# Patient Record
Sex: MALE | Race: WHITE | NOT HISPANIC OR LATINO
[De-identification: names, ages, dates, MRNs, and addresses within clinical notes are randomized per-mention and may not be internally consistent; named-entity substitution may affect disease eponyms.]

---

## 2017-04-06 ENCOUNTER — APPOINTMENT (OUTPATIENT)
Dept: FAMILY MEDICINE | Facility: HOSPITAL | Age: 42
End: 2017-04-06

## 2017-04-06 ENCOUNTER — OUTPATIENT (OUTPATIENT)
Dept: OUTPATIENT SERVICES | Facility: HOSPITAL | Age: 42
LOS: 1 days | End: 2017-04-06
Payer: SELF-PAY

## 2017-04-06 VITALS
WEIGHT: 175 LBS | DIASTOLIC BLOOD PRESSURE: 80 MMHG | RESPIRATION RATE: 14 BRPM | BODY MASS INDEX: 26.52 KG/M2 | TEMPERATURE: 97.8 F | SYSTOLIC BLOOD PRESSURE: 118 MMHG | HEART RATE: 85 BPM | OXYGEN SATURATION: 97 % | HEIGHT: 68 IN

## 2017-04-06 DIAGNOSIS — F41.9 ANXIETY DISORDER, UNSPECIFIED: ICD-10-CM

## 2017-04-06 DIAGNOSIS — R10.9 UNSPECIFIED ABDOMINAL PAIN: ICD-10-CM

## 2017-04-06 DIAGNOSIS — R19.5 OTHER FECAL ABNORMALITIES: ICD-10-CM

## 2017-04-06 PROCEDURE — G0463: CPT

## 2017-04-10 ENCOUNTER — OUTPATIENT (OUTPATIENT)
Dept: OUTPATIENT SERVICES | Facility: HOSPITAL | Age: 42
LOS: 1 days | End: 2017-04-10
Payer: SELF-PAY

## 2017-04-10 DIAGNOSIS — Z00.00 ENCOUNTER FOR GENERAL ADULT MEDICAL EXAMINATION WITHOUT ABNORMAL FINDINGS: ICD-10-CM

## 2017-04-10 PROCEDURE — 80061 LIPID PANEL: CPT

## 2017-04-10 PROCEDURE — 85025 COMPLETE CBC W/AUTO DIFF WBC: CPT

## 2017-04-10 PROCEDURE — 83036 HEMOGLOBIN GLYCOSYLATED A1C: CPT

## 2017-04-10 PROCEDURE — 80053 COMPREHEN METABOLIC PANEL: CPT

## 2017-04-10 PROCEDURE — 36415 COLL VENOUS BLD VENIPUNCTURE: CPT

## 2017-04-26 ENCOUNTER — OUTPATIENT (OUTPATIENT)
Dept: OUTPATIENT SERVICES | Facility: HOSPITAL | Age: 42
LOS: 1 days | End: 2017-04-26
Payer: SELF-PAY

## 2017-04-26 ENCOUNTER — APPOINTMENT (OUTPATIENT)
Dept: FAMILY MEDICINE | Facility: HOSPITAL | Age: 42
End: 2017-04-26

## 2017-04-26 VITALS
WEIGHT: 176 LBS | BODY MASS INDEX: 26.67 KG/M2 | RESPIRATION RATE: 15 BRPM | SYSTOLIC BLOOD PRESSURE: 123 MMHG | DIASTOLIC BLOOD PRESSURE: 76 MMHG | TEMPERATURE: 98.1 F | HEIGHT: 68 IN | OXYGEN SATURATION: 98 % | HEART RATE: 78 BPM

## 2017-04-26 DIAGNOSIS — F32.9 MAJOR DEPRESSIVE DISORDER, SINGLE EPISODE, UNSPECIFIED: ICD-10-CM

## 2017-04-26 DIAGNOSIS — F41.9 ANXIETY DISORDER, UNSPECIFIED: ICD-10-CM

## 2017-04-26 PROCEDURE — G0463: CPT

## 2017-05-19 ENCOUNTER — OUTPATIENT (OUTPATIENT)
Dept: OUTPATIENT SERVICES | Facility: HOSPITAL | Age: 42
LOS: 1 days | End: 2017-05-19
Payer: SELF-PAY

## 2017-05-19 ENCOUNTER — APPOINTMENT (OUTPATIENT)
Dept: FAMILY MEDICINE | Facility: HOSPITAL | Age: 42
End: 2017-05-19

## 2017-05-19 VITALS
WEIGHT: 172 LBS | HEART RATE: 80 BPM | BODY MASS INDEX: 26.07 KG/M2 | HEIGHT: 68 IN | TEMPERATURE: 97.2 F | SYSTOLIC BLOOD PRESSURE: 109 MMHG | OXYGEN SATURATION: 98 % | DIASTOLIC BLOOD PRESSURE: 75 MMHG | RESPIRATION RATE: 16 BRPM

## 2017-05-19 DIAGNOSIS — F41.9 ANXIETY DISORDER, UNSPECIFIED: ICD-10-CM

## 2017-05-19 PROCEDURE — G0463: CPT

## 2017-06-02 ENCOUNTER — APPOINTMENT (OUTPATIENT)
Dept: FAMILY MEDICINE | Facility: HOSPITAL | Age: 42
End: 2017-06-02

## 2017-06-02 ENCOUNTER — OUTPATIENT (OUTPATIENT)
Dept: OUTPATIENT SERVICES | Facility: HOSPITAL | Age: 42
LOS: 1 days | End: 2017-06-02
Payer: SELF-PAY

## 2017-06-02 VITALS
SYSTOLIC BLOOD PRESSURE: 112 MMHG | HEART RATE: 84 BPM | RESPIRATION RATE: 16 BRPM | HEIGHT: 68 IN | WEIGHT: 171 LBS | TEMPERATURE: 98.2 F | DIASTOLIC BLOOD PRESSURE: 78 MMHG | BODY MASS INDEX: 25.91 KG/M2 | OXYGEN SATURATION: 96 %

## 2017-06-02 DIAGNOSIS — Z87.898 PERSONAL HISTORY OF OTHER SPECIFIED CONDITIONS: ICD-10-CM

## 2017-06-02 PROCEDURE — G0463: CPT

## 2017-06-12 ENCOUNTER — RX RENEWAL (OUTPATIENT)
Age: 42
End: 2017-06-12

## 2017-06-13 ENCOUNTER — RX RENEWAL (OUTPATIENT)
Age: 42
End: 2017-06-13

## 2017-06-22 ENCOUNTER — APPOINTMENT (OUTPATIENT)
Dept: FAMILY MEDICINE | Facility: HOSPITAL | Age: 42
End: 2017-06-22

## 2017-06-22 ENCOUNTER — RESULT CHARGE (OUTPATIENT)
Age: 42
End: 2017-06-22

## 2017-06-22 ENCOUNTER — OUTPATIENT (OUTPATIENT)
Dept: OUTPATIENT SERVICES | Facility: HOSPITAL | Age: 42
LOS: 1 days | End: 2017-06-22
Payer: SELF-PAY

## 2017-06-22 VITALS
WEIGHT: 173 LBS | DIASTOLIC BLOOD PRESSURE: 79 MMHG | HEART RATE: 75 BPM | BODY MASS INDEX: 26.22 KG/M2 | SYSTOLIC BLOOD PRESSURE: 116 MMHG | TEMPERATURE: 98.1 F | HEIGHT: 68 IN | OXYGEN SATURATION: 96 %

## 2017-06-22 DIAGNOSIS — J02.9 ACUTE PHARYNGITIS, UNSPECIFIED: ICD-10-CM

## 2017-06-22 DIAGNOSIS — J06.9 ACUTE UPPER RESPIRATORY INFECTION, UNSPECIFIED: ICD-10-CM

## 2017-06-22 DIAGNOSIS — Z87.898 PERSONAL HISTORY OF OTHER SPECIFIED CONDITIONS: ICD-10-CM

## 2017-06-22 DIAGNOSIS — F41.9 ANXIETY DISORDER, UNSPECIFIED: ICD-10-CM

## 2017-06-22 DIAGNOSIS — J18.9 PNEUMONIA, UNSPECIFIED ORGANISM: ICD-10-CM

## 2017-06-22 DIAGNOSIS — Z87.09 PERSONAL HISTORY OF OTHER DISEASES OF THE RESPIRATORY SYSTEM: ICD-10-CM

## 2017-06-22 DIAGNOSIS — Z86.19 PERSONAL HISTORY OF OTHER INFECTIOUS AND PARASITIC DISEASES: ICD-10-CM

## 2017-06-22 PROCEDURE — G0463: CPT

## 2017-06-22 RX ORDER — BUPROPION HYDROCHLORIDE 150 MG/1
150 TABLET, EXTENDED RELEASE ORAL DAILY
Qty: 30 | Refills: 0 | Status: DISCONTINUED | COMMUNITY
Start: 2017-04-26 | End: 2017-06-22

## 2017-06-23 ENCOUNTER — APPOINTMENT (OUTPATIENT)
Dept: CARDIOLOGY | Facility: HOSPITAL | Age: 42
End: 2017-06-23

## 2017-06-23 ENCOUNTER — OUTPATIENT (OUTPATIENT)
Dept: OUTPATIENT SERVICES | Facility: HOSPITAL | Age: 42
LOS: 1 days | End: 2017-06-23
Payer: SELF-PAY

## 2017-06-23 ENCOUNTER — CHART COPY (OUTPATIENT)
Age: 42
End: 2017-06-23

## 2017-06-23 VITALS
WEIGHT: 173 LBS | BODY MASS INDEX: 26.3 KG/M2 | RESPIRATION RATE: 16 BRPM | OXYGEN SATURATION: 97 % | DIASTOLIC BLOOD PRESSURE: 78 MMHG | TEMPERATURE: 97.2 F | SYSTOLIC BLOOD PRESSURE: 128 MMHG | HEART RATE: 92 BPM

## 2017-06-23 DIAGNOSIS — Z87.898 PERSONAL HISTORY OF OTHER SPECIFIED CONDITIONS: ICD-10-CM

## 2017-06-23 PROCEDURE — G0463: CPT

## 2017-06-23 PROCEDURE — 93005 ELECTROCARDIOGRAM TRACING: CPT

## 2017-06-26 ENCOUNTER — RX RENEWAL (OUTPATIENT)
Age: 42
End: 2017-06-26

## 2017-06-27 ENCOUNTER — OUTPATIENT (OUTPATIENT)
Dept: OUTPATIENT SERVICES | Facility: HOSPITAL | Age: 42
LOS: 1 days | End: 2017-06-27
Payer: SELF-PAY

## 2017-06-27 DIAGNOSIS — F41.9 ANXIETY DISORDER, UNSPECIFIED: ICD-10-CM

## 2017-06-27 DIAGNOSIS — Z87.898 PERSONAL HISTORY OF OTHER SPECIFIED CONDITIONS: ICD-10-CM

## 2017-06-27 PROCEDURE — 93016 CV STRESS TEST SUPVJ ONLY: CPT

## 2017-06-27 PROCEDURE — 93306 TTE W/DOPPLER COMPLETE: CPT | Mod: 26

## 2017-06-27 PROCEDURE — 93018 CV STRESS TEST I&R ONLY: CPT

## 2017-06-27 PROCEDURE — 93227 XTRNL ECG REC<48 HR R&I: CPT

## 2017-06-28 ENCOUNTER — APPOINTMENT (OUTPATIENT)
Dept: FAMILY MEDICINE | Facility: HOSPITAL | Age: 42
End: 2017-06-28

## 2017-06-28 PROCEDURE — 93225 XTRNL ECG REC<48 HRS REC: CPT

## 2017-06-28 PROCEDURE — 93226 XTRNL ECG REC<48 HR SCAN A/R: CPT

## 2017-06-28 PROCEDURE — 93306 TTE W/DOPPLER COMPLETE: CPT

## 2017-06-28 PROCEDURE — 93017 CV STRESS TEST TRACING ONLY: CPT

## 2017-06-28 PROCEDURE — 84443 ASSAY THYROID STIM HORMONE: CPT

## 2017-06-28 PROCEDURE — 36415 COLL VENOUS BLD VENIPUNCTURE: CPT

## 2017-07-12 ENCOUNTER — APPOINTMENT (OUTPATIENT)
Dept: FAMILY MEDICINE | Facility: HOSPITAL | Age: 42
End: 2017-07-12

## 2017-07-13 ENCOUNTER — OUTPATIENT (OUTPATIENT)
Dept: OUTPATIENT SERVICES | Facility: HOSPITAL | Age: 42
LOS: 1 days | End: 2017-07-13
Payer: MEDICAID

## 2017-07-13 ENCOUNTER — APPOINTMENT (OUTPATIENT)
Dept: FAMILY MEDICINE | Facility: HOSPITAL | Age: 42
End: 2017-07-13

## 2017-07-13 VITALS
OXYGEN SATURATION: 99 % | BODY MASS INDEX: 26.07 KG/M2 | SYSTOLIC BLOOD PRESSURE: 103 MMHG | TEMPERATURE: 98.4 F | DIASTOLIC BLOOD PRESSURE: 75 MMHG | WEIGHT: 172 LBS | HEART RATE: 97 BPM | RESPIRATION RATE: 16 BRPM | HEIGHT: 68 IN

## 2017-07-13 DIAGNOSIS — I88.9 NONSPECIFIC LYMPHADENITIS, UNSPECIFIED: ICD-10-CM

## 2017-07-13 DIAGNOSIS — M25.519 PAIN IN UNSPECIFIED SHOULDER: ICD-10-CM

## 2017-07-13 DIAGNOSIS — M67.911 UNSPECIFIED DISORDER OF SYNOVIUM AND TENDON, RIGHT SHOULDER: ICD-10-CM

## 2017-07-13 DIAGNOSIS — F41.9 ANXIETY DISORDER, UNSPECIFIED: ICD-10-CM

## 2017-07-13 DIAGNOSIS — M67.912 UNSPECIFIED DISORDER OF SYNOVIUM AND TENDON, RIGHT SHOULDER: ICD-10-CM

## 2017-07-13 DIAGNOSIS — R10.9 UNSPECIFIED ABDOMINAL PAIN: ICD-10-CM

## 2017-07-13 DIAGNOSIS — M25.569 PAIN IN UNSPECIFIED KNEE: ICD-10-CM

## 2017-07-13 DIAGNOSIS — M75.110 INCOMPLETE ROTATOR CUFF TEAR OR RUPTURE OF UNSPECIFIED SHOULDER, NOT SPECIFIED AS TRAUMATIC: ICD-10-CM

## 2017-07-13 DIAGNOSIS — Z86.59 PERSONAL HISTORY OF OTHER MENTAL AND BEHAVIORAL DISORDERS: ICD-10-CM

## 2017-07-13 DIAGNOSIS — E78.1 PURE HYPERGLYCERIDEMIA: ICD-10-CM

## 2017-07-13 RX ORDER — PROPRANOLOL HYDROCHLORIDE 40 MG/1
40 TABLET ORAL DAILY
Qty: 14 | Refills: 0 | Status: DISCONTINUED | COMMUNITY
Start: 2017-05-19 | End: 2017-07-13

## 2017-07-15 PROCEDURE — 87045 FECES CULTURE AEROBIC BACT: CPT

## 2017-07-15 PROCEDURE — G0463: CPT

## 2017-07-15 PROCEDURE — 87046 STOOL CULTR AEROBIC BACT EA: CPT

## 2017-07-15 PROCEDURE — 87177 OVA AND PARASITES SMEARS: CPT

## 2017-07-15 PROCEDURE — 89055 LEUKOCYTE ASSESSMENT FECAL: CPT

## 2017-07-17 ENCOUNTER — OTHER (OUTPATIENT)
Age: 42
End: 2017-07-17

## 2017-07-20 ENCOUNTER — RX RENEWAL (OUTPATIENT)
Age: 42
End: 2017-07-20

## 2017-07-21 ENCOUNTER — OUTPATIENT (OUTPATIENT)
Dept: OUTPATIENT SERVICES | Facility: HOSPITAL | Age: 42
LOS: 1 days | End: 2017-07-21
Payer: MEDICAID

## 2017-07-21 ENCOUNTER — APPOINTMENT (OUTPATIENT)
Dept: CARDIOLOGY | Facility: HOSPITAL | Age: 42
End: 2017-07-21

## 2017-07-21 VITALS
HEIGHT: 68 IN | DIASTOLIC BLOOD PRESSURE: 77 MMHG | RESPIRATION RATE: 16 BRPM | OXYGEN SATURATION: 97 % | SYSTOLIC BLOOD PRESSURE: 106 MMHG | WEIGHT: 172 LBS | TEMPERATURE: 98.1 F | BODY MASS INDEX: 26.07 KG/M2 | HEART RATE: 75 BPM

## 2017-07-21 DIAGNOSIS — Z87.898 PERSONAL HISTORY OF OTHER SPECIFIED CONDITIONS: ICD-10-CM

## 2017-07-21 DIAGNOSIS — R55 SYNCOPE AND COLLAPSE: ICD-10-CM

## 2017-07-21 DIAGNOSIS — F17.200 NICOTINE DEPENDENCE, UNSPECIFIED, UNCOMPLICATED: ICD-10-CM

## 2017-07-21 PROCEDURE — G0463: CPT

## 2017-08-08 ENCOUNTER — LABORATORY RESULT (OUTPATIENT)
Age: 42
End: 2017-08-08

## 2017-08-09 ENCOUNTER — APPOINTMENT (OUTPATIENT)
Dept: INTERNAL MEDICINE | Facility: CLINIC | Age: 42
End: 2017-08-09
Payer: MEDICAID

## 2017-08-09 VITALS
BODY MASS INDEX: 25.76 KG/M2 | TEMPERATURE: 98.3 F | HEART RATE: 78 BPM | WEIGHT: 170 LBS | DIASTOLIC BLOOD PRESSURE: 70 MMHG | HEIGHT: 68 IN | OXYGEN SATURATION: 98 % | SYSTOLIC BLOOD PRESSURE: 106 MMHG | RESPIRATION RATE: 15 BRPM

## 2017-08-09 DIAGNOSIS — E78.5 HYPERLIPIDEMIA, UNSPECIFIED: ICD-10-CM

## 2017-08-09 PROCEDURE — 36415 COLL VENOUS BLD VENIPUNCTURE: CPT

## 2017-08-09 PROCEDURE — 99204 OFFICE O/P NEW MOD 45 MIN: CPT | Mod: 25

## 2017-08-09 RX ORDER — BUPROPION HYDROCHLORIDE 300 MG/1
300 TABLET, EXTENDED RELEASE ORAL DAILY
Qty: 30 | Refills: 2 | Status: DISCONTINUED | COMMUNITY
Start: 2017-06-22 | End: 2017-08-09

## 2017-08-15 ENCOUNTER — APPOINTMENT (OUTPATIENT)
Dept: RADIOLOGY | Facility: CLINIC | Age: 42
End: 2017-08-15
Payer: MEDICAID

## 2017-08-15 ENCOUNTER — OUTPATIENT (OUTPATIENT)
Dept: OUTPATIENT SERVICES | Facility: HOSPITAL | Age: 42
LOS: 1 days | End: 2017-08-15
Payer: MEDICAID

## 2017-08-15 DIAGNOSIS — M19.041 PRIMARY OSTEOARTHRITIS, RIGHT HAND: ICD-10-CM

## 2017-08-15 DIAGNOSIS — M19.042 PRIMARY OSTEOARTHRITIS, LEFT HAND: ICD-10-CM

## 2017-08-15 PROCEDURE — 73130 X-RAY EXAM OF HAND: CPT | Mod: 26,LT

## 2017-08-15 PROCEDURE — 73130 X-RAY EXAM OF HAND: CPT

## 2017-08-28 LAB
ALBUMIN SERPL ELPH-MCNC: 4.8 G/DL
ALP BLD-CCNC: 63 U/L
ALT SERPL-CCNC: 17 U/L
ANION GAP SERPL CALC-SCNC: 17 MMOL/L
APPEARANCE: CLEAR
AST SERPL-CCNC: 24 U/L
BASOPHILS # BLD AUTO: 0.02 K/UL
BASOPHILS NFR BLD AUTO: 0.2 %
BILIRUB SERPL-MCNC: 0.4 MG/DL
BILIRUBIN URINE: NEGATIVE
BLOOD URINE: NEGATIVE
BUN SERPL-MCNC: 18 MG/DL
CALCIUM SERPL-MCNC: 9.9 MG/DL
CHLORIDE SERPL-SCNC: 96 MMOL/L
CHOLEST SERPL-MCNC: 272 MG/DL
CHOLEST/HDLC SERPL: 6 RATIO
CO2 SERPL-SCNC: 25 MMOL/L
COLOR: YELLOW
CREAT SERPL-MCNC: 1.08 MG/DL
EOSINOPHIL # BLD AUTO: 0.19 K/UL
EOSINOPHIL NFR BLD AUTO: 2.3 %
GLUCOSE QUALITATIVE U: NORMAL MG/DL
GLUCOSE SERPL-MCNC: 79 MG/DL
HBA1C MFR BLD HPLC: 5.5 %
HCT VFR BLD CALC: 49.4 %
HDLC SERPL-MCNC: 45 MG/DL
HGB BLD-MCNC: 16.9 G/DL
IMM GRANULOCYTES NFR BLD AUTO: 0.1 %
KETONES URINE: NEGATIVE
LDLC SERPL CALC-MCNC: 184 MG/DL
LEUKOCYTE ESTERASE URINE: ABNORMAL
LYMPHOCYTES # BLD AUTO: 2.11 K/UL
LYMPHOCYTES NFR BLD AUTO: 25.1 %
MAN DIFF?: NORMAL
MCHC RBC-ENTMCNC: 29 PG
MCHC RBC-ENTMCNC: 34.2 GM/DL
MCV RBC AUTO: 84.9 FL
MONOCYTES # BLD AUTO: 0.6 K/UL
MONOCYTES NFR BLD AUTO: 7.1 %
NEUTROPHILS # BLD AUTO: 5.48 K/UL
NEUTROPHILS NFR BLD AUTO: 65.2 %
NITRITE URINE: NEGATIVE
PH URINE: 5.5
PLATELET # BLD AUTO: 211 K/UL
POTASSIUM SERPL-SCNC: 4.3 MMOL/L
PROT SERPL-MCNC: 8.8 G/DL
PROTEIN URINE: NEGATIVE MG/DL
RBC # BLD: 5.82 M/UL
RBC # FLD: 13.2 %
SODIUM SERPL-SCNC: 138 MMOL/L
SPECIFIC GRAVITY URINE: 1.01
T4 FREE SERPL-MCNC: 1.2 NG/DL
TRIGL SERPL-MCNC: 214 MG/DL
TSH SERPL-ACNC: 1.5 UIU/ML
TTG IGA SER IA-ACNC: <5 UNITS
TTG IGA SER-ACNC: NEGATIVE
TTG IGG SER IA-ACNC: <5 UNITS
TTG IGG SER IA-ACNC: NEGATIVE
UROBILINOGEN URINE: NORMAL MG/DL
WBC # FLD AUTO: 8.41 K/UL

## 2017-08-29 ENCOUNTER — MEDICATION RENEWAL (OUTPATIENT)
Age: 42
End: 2017-08-29

## 2017-09-12 ENCOUNTER — APPOINTMENT (OUTPATIENT)
Dept: INTERNAL MEDICINE | Facility: CLINIC | Age: 42
End: 2017-09-12
Payer: MEDICAID

## 2017-09-12 VITALS
DIASTOLIC BLOOD PRESSURE: 82 MMHG | HEART RATE: 66 BPM | HEIGHT: 68 IN | OXYGEN SATURATION: 98 % | TEMPERATURE: 98.2 F | SYSTOLIC BLOOD PRESSURE: 110 MMHG

## 2017-09-12 PROCEDURE — 99214 OFFICE O/P EST MOD 30 MIN: CPT

## 2017-11-09 ENCOUNTER — APPOINTMENT (OUTPATIENT)
Dept: INTERNAL MEDICINE | Facility: CLINIC | Age: 42
End: 2017-11-09
Payer: MEDICAID

## 2017-11-09 VITALS
WEIGHT: 175 LBS | SYSTOLIC BLOOD PRESSURE: 100 MMHG | HEIGHT: 68 IN | HEART RATE: 77 BPM | BODY MASS INDEX: 26.52 KG/M2 | TEMPERATURE: 98.2 F | DIASTOLIC BLOOD PRESSURE: 70 MMHG | OXYGEN SATURATION: 94 %

## 2017-11-09 PROCEDURE — 99213 OFFICE O/P EST LOW 20 MIN: CPT

## 2017-12-15 ENCOUNTER — MEDICATION RENEWAL (OUTPATIENT)
Age: 42
End: 2017-12-15

## 2018-01-17 ENCOUNTER — APPOINTMENT (OUTPATIENT)
Dept: ORTHOPEDIC SURGERY | Facility: CLINIC | Age: 43
End: 2018-01-17
Payer: MEDICAID

## 2018-01-17 ENCOUNTER — APPOINTMENT (OUTPATIENT)
Dept: ORTHOPEDIC SURGERY | Facility: CLINIC | Age: 43
End: 2018-01-17

## 2018-01-17 ENCOUNTER — OUTPATIENT (OUTPATIENT)
Dept: OUTPATIENT SERVICES | Facility: HOSPITAL | Age: 43
LOS: 1 days | End: 2018-01-17
Payer: MEDICAID

## 2018-01-17 ENCOUNTER — APPOINTMENT (OUTPATIENT)
Dept: MRI IMAGING | Facility: HOSPITAL | Age: 43
End: 2018-01-17
Payer: MEDICAID

## 2018-01-17 VITALS — DIASTOLIC BLOOD PRESSURE: 74 MMHG | HEART RATE: 81 BPM | SYSTOLIC BLOOD PRESSURE: 112 MMHG

## 2018-01-17 DIAGNOSIS — M51.36 OTHER INTERVERTEBRAL DISC DEGENERATION, LUMBAR REGION: ICD-10-CM

## 2018-01-17 DIAGNOSIS — M47.817 SPONDYLOSIS W/OUT MYELOPATHY OR RADICULOPATHY, LUMBOSACRAL REGION: ICD-10-CM

## 2018-01-17 DIAGNOSIS — M47.816 SPONDYLOSIS WITHOUT MYELOPATHY OR RADICULOPATHY, LUMBAR REGION: ICD-10-CM

## 2018-01-17 PROCEDURE — 99214 OFFICE O/P EST MOD 30 MIN: CPT

## 2018-01-17 PROCEDURE — 72148 MRI LUMBAR SPINE W/O DYE: CPT

## 2018-01-17 PROCEDURE — 72148 MRI LUMBAR SPINE W/O DYE: CPT | Mod: 26

## 2018-01-31 ENCOUNTER — APPOINTMENT (OUTPATIENT)
Dept: GASTROENTEROLOGY | Facility: CLINIC | Age: 43
End: 2018-01-31
Payer: MEDICAID

## 2018-01-31 VITALS
RESPIRATION RATE: 15 BRPM | WEIGHT: 174 LBS | BODY MASS INDEX: 26.37 KG/M2 | HEART RATE: 64 BPM | HEIGHT: 68 IN | DIASTOLIC BLOOD PRESSURE: 80 MMHG | TEMPERATURE: 98 F | SYSTOLIC BLOOD PRESSURE: 125 MMHG | OXYGEN SATURATION: 97 %

## 2018-01-31 DIAGNOSIS — Z87.891 PERSONAL HISTORY OF NICOTINE DEPENDENCE: ICD-10-CM

## 2018-01-31 PROCEDURE — 99203 OFFICE O/P NEW LOW 30 MIN: CPT

## 2018-02-01 ENCOUNTER — RX RENEWAL (OUTPATIENT)
Age: 43
End: 2018-02-01

## 2018-02-08 ENCOUNTER — OTHER (OUTPATIENT)
Age: 43
End: 2018-02-08

## 2018-02-21 ENCOUNTER — APPOINTMENT (OUTPATIENT)
Dept: INTERNAL MEDICINE | Facility: CLINIC | Age: 43
End: 2018-02-21
Payer: MEDICAID

## 2018-02-21 VITALS
WEIGHT: 179 LBS | HEIGHT: 68 IN | RESPIRATION RATE: 16 BRPM | OXYGEN SATURATION: 95 % | TEMPERATURE: 98.9 F | BODY MASS INDEX: 27.13 KG/M2 | SYSTOLIC BLOOD PRESSURE: 104 MMHG | HEART RATE: 77 BPM | DIASTOLIC BLOOD PRESSURE: 78 MMHG

## 2018-02-21 DIAGNOSIS — J40 BRONCHITIS, NOT SPECIFIED AS ACUTE OR CHRONIC: ICD-10-CM

## 2018-02-21 PROCEDURE — 99213 OFFICE O/P EST LOW 20 MIN: CPT

## 2018-02-21 RX ORDER — ESCITALOPRAM OXALATE 10 MG/1
10 TABLET ORAL
Qty: 3 | Refills: 0 | Status: DISCONTINUED | COMMUNITY
Start: 2017-08-09

## 2018-03-05 ENCOUNTER — OTHER (OUTPATIENT)
Age: 43
End: 2018-03-05

## 2018-03-05 LAB
ALBUMIN SERPL ELPH-MCNC: 4.3 G/DL
ALP BLD-CCNC: 64 U/L
ALT SERPL-CCNC: 28 U/L
ANION GAP SERPL CALC-SCNC: 16 MMOL/L
AST SERPL-CCNC: 21 U/L
BILIRUB SERPL-MCNC: 0.4 MG/DL
BUN SERPL-MCNC: 16 MG/DL
CALCIUM SERPL-MCNC: 9.8 MG/DL
CHLORIDE SERPL-SCNC: 97 MMOL/L
CO2 SERPL-SCNC: 26 MMOL/L
CREAT SERPL-MCNC: 1.26 MG/DL
CRP SERPL-MCNC: 0.2 MG/DL
ERYTHROCYTE [SEDIMENTATION RATE] IN BLOOD BY WESTERGREN METHOD: 13 MM/HR
GLUCOSE SERPL-MCNC: 104 MG/DL
POTASSIUM SERPL-SCNC: 4.8 MMOL/L
PROT SERPL-MCNC: 7.3 G/DL
SODIUM SERPL-SCNC: 139 MMOL/L
TSH SERPL-ACNC: 1.06 UIU/ML

## 2018-03-06 LAB
GLIADIN IGA SER QL: <5 UNITS
GLIADIN IGG SER QL: <5 UNITS
GLIADIN PEPTIDE IGA SER-ACNC: NEGATIVE
GLIADIN PEPTIDE IGG SER-ACNC: NEGATIVE
IGA SER QL IEP: 210 MG/DL
TTG IGA SER IA-ACNC: <5 UNITS
TTG IGA SER-ACNC: NEGATIVE
TTG IGG SER IA-ACNC: <5 UNITS
TTG IGG SER IA-ACNC: NEGATIVE

## 2018-03-07 LAB
ENDOMYSIUM IGA SER QL: NEGATIVE
ENDOMYSIUM IGA TITR SER: NORMAL

## 2018-03-13 ENCOUNTER — TRANSCRIPTION ENCOUNTER (OUTPATIENT)
Age: 43
End: 2018-03-13

## 2018-03-13 ENCOUNTER — OTHER (OUTPATIENT)
Age: 43
End: 2018-03-13

## 2018-03-13 ENCOUNTER — RESULT REVIEW (OUTPATIENT)
Age: 43
End: 2018-03-13

## 2018-03-13 ENCOUNTER — APPOINTMENT (OUTPATIENT)
Dept: GASTROENTEROLOGY | Facility: HOSPITAL | Age: 43
End: 2018-03-13
Payer: MEDICAID

## 2018-03-13 ENCOUNTER — OUTPATIENT (OUTPATIENT)
Dept: OUTPATIENT SERVICES | Facility: HOSPITAL | Age: 43
LOS: 1 days | End: 2018-03-13
Payer: MEDICAID

## 2018-03-13 DIAGNOSIS — R10.9 UNSPECIFIED ABDOMINAL PAIN: ICD-10-CM

## 2018-03-13 PROCEDURE — 43239 EGD BIOPSY SINGLE/MULTIPLE: CPT

## 2018-03-13 PROCEDURE — 45380 COLONOSCOPY AND BIOPSY: CPT

## 2018-03-13 PROCEDURE — 88312 SPECIAL STAINS GROUP 1: CPT

## 2018-03-13 PROCEDURE — 88305 TISSUE EXAM BY PATHOLOGIST: CPT

## 2018-03-13 PROCEDURE — 88313 SPECIAL STAINS GROUP 2: CPT

## 2018-03-13 PROCEDURE — 88312 SPECIAL STAINS GROUP 1: CPT | Mod: 26

## 2018-03-13 PROCEDURE — 88313 SPECIAL STAINS GROUP 2: CPT | Mod: 26

## 2018-03-13 PROCEDURE — 88305 TISSUE EXAM BY PATHOLOGIST: CPT | Mod: 26

## 2018-03-13 RX ORDER — SODIUM SULFATE, POTASSIUM SULFATE, MAGNESIUM SULFATE 17.5; 3.13; 1.6 G/ML; G/ML; G/ML
17.5-3.13-1.6 SOLUTION, CONCENTRATE ORAL
Qty: 1 | Refills: 0 | Status: DISCONTINUED | COMMUNITY
Start: 2018-01-31 | End: 2018-03-13

## 2018-03-13 RX ORDER — AMOXICILLIN AND CLAVULANATE POTASSIUM 875; 125 MG/1; MG/1
875-125 TABLET, COATED ORAL
Qty: 14 | Refills: 0 | Status: DISCONTINUED | COMMUNITY
Start: 2018-02-21 | End: 2018-03-13

## 2018-03-13 RX ORDER — POLYETHYLENE GLYCOL-3350 AND ELECTROLYTES 236; 6.74; 5.86; 2.97; 22.74 G/274.31G; G/274.31G; G/274.31G; G/274.31G; G/274.31G
236 POWDER, FOR SOLUTION ORAL
Qty: 1 | Refills: 0 | Status: DISCONTINUED | COMMUNITY
Start: 2018-02-01 | End: 2018-03-13

## 2018-03-14 ENCOUNTER — LABORATORY RESULT (OUTPATIENT)
Age: 43
End: 2018-03-14

## 2018-03-14 LAB — SURGICAL PATHOLOGY FINAL REPORT - CH: SIGNIFICANT CHANGE UP

## 2018-03-15 ENCOUNTER — OTHER (OUTPATIENT)
Age: 43
End: 2018-03-15

## 2018-03-15 ENCOUNTER — LABORATORY RESULT (OUTPATIENT)
Age: 43
End: 2018-03-15

## 2018-03-15 LAB — GASTRIN SERPL-MCNC: <10 PG/ML

## 2018-03-16 ENCOUNTER — OTHER (OUTPATIENT)
Age: 43
End: 2018-03-16

## 2018-03-28 ENCOUNTER — APPOINTMENT (OUTPATIENT)
Dept: GASTROENTEROLOGY | Facility: CLINIC | Age: 43
End: 2018-03-28
Payer: MEDICAID

## 2018-03-28 VITALS
BODY MASS INDEX: 27.13 KG/M2 | OXYGEN SATURATION: 96 % | RESPIRATION RATE: 16 BRPM | HEART RATE: 75 BPM | HEIGHT: 68 IN | SYSTOLIC BLOOD PRESSURE: 129 MMHG | DIASTOLIC BLOOD PRESSURE: 86 MMHG | WEIGHT: 179 LBS

## 2018-03-28 PROCEDURE — 99215 OFFICE O/P EST HI 40 MIN: CPT

## 2018-03-28 RX ORDER — PSYLLIUM HUSK 6 G/6G
100 GRANULE ORAL
Qty: 1 | Refills: 5 | Status: DISCONTINUED | COMMUNITY
Start: 2018-01-31 | End: 2018-03-28

## 2018-05-01 ENCOUNTER — OUTPATIENT (OUTPATIENT)
Dept: OUTPATIENT SERVICES | Facility: HOSPITAL | Age: 43
LOS: 1 days | End: 2018-05-01

## 2018-05-10 ENCOUNTER — APPOINTMENT (OUTPATIENT)
Dept: INTERNAL MEDICINE | Facility: CLINIC | Age: 43
End: 2018-05-10
Payer: MEDICAID

## 2018-05-10 VITALS
BODY MASS INDEX: 26.52 KG/M2 | DIASTOLIC BLOOD PRESSURE: 78 MMHG | HEART RATE: 83 BPM | OXYGEN SATURATION: 96 % | SYSTOLIC BLOOD PRESSURE: 106 MMHG | TEMPERATURE: 99.1 F | HEIGHT: 68 IN | RESPIRATION RATE: 16 BRPM | WEIGHT: 175 LBS

## 2018-05-10 DIAGNOSIS — K26.3 ACUTE DUODENAL ULCER W/OUT HEMORRHAGE OR PERFORATION: ICD-10-CM

## 2018-05-10 PROCEDURE — 99214 OFFICE O/P EST MOD 30 MIN: CPT | Mod: 25

## 2018-05-10 PROCEDURE — 36415 COLL VENOUS BLD VENIPUNCTURE: CPT

## 2018-05-10 RX ORDER — TRAZODONE HYDROCHLORIDE 100 MG/1
100 TABLET ORAL
Qty: 30 | Refills: 0 | Status: DISCONTINUED | COMMUNITY
Start: 2018-03-22

## 2018-05-10 RX ORDER — DIPHENHYDRAMINE HCL 50 MG/1
50 CAPSULE ORAL
Qty: 60 | Refills: 0 | Status: DISCONTINUED | COMMUNITY
Start: 2018-04-26

## 2018-05-10 RX ORDER — LATANOPROST/PF 0.005 %
0.01 DROPS OPHTHALMIC (EYE)
Qty: 9 | Refills: 0 | Status: DISCONTINUED | COMMUNITY
Start: 2018-02-13

## 2018-05-10 RX ORDER — AZELASTINE HYDROCHLORIDE 0.5 MG/ML
0.05 SOLUTION/ DROPS OPHTHALMIC
Qty: 6 | Refills: 0 | Status: DISCONTINUED | COMMUNITY
Start: 2018-04-13

## 2018-05-10 RX ORDER — QUETIAPINE FUMARATE 50 MG/1
50 TABLET ORAL
Qty: 30 | Refills: 0 | Status: DISCONTINUED | COMMUNITY
Start: 2018-02-22

## 2018-05-15 LAB
ALBUMIN SERPL ELPH-MCNC: 4.4 G/DL
ALP BLD-CCNC: 67 U/L
ALT SERPL-CCNC: 20 U/L
ANION GAP SERPL CALC-SCNC: 17 MMOL/L
AST SERPL-CCNC: 20 U/L
BASOPHILS # BLD AUTO: 0.01 K/UL
BASOPHILS NFR BLD AUTO: 0.2 %
BILIRUB SERPL-MCNC: 0.3 MG/DL
BUN SERPL-MCNC: 11 MG/DL
CALCIUM SERPL-MCNC: 10.2 MG/DL
CHLORIDE SERPL-SCNC: 101 MMOL/L
CO2 SERPL-SCNC: 22 MMOL/L
CREAT SERPL-MCNC: 1.21 MG/DL
EOSINOPHIL # BLD AUTO: 0.09 K/UL
EOSINOPHIL NFR BLD AUTO: 1.8 %
GLUCOSE SERPL-MCNC: 88 MG/DL
HCT VFR BLD CALC: 45.2 %
HGB BLD-MCNC: 14.9 G/DL
IMM GRANULOCYTES NFR BLD AUTO: 0.2 %
LYMPHOCYTES # BLD AUTO: 1.72 K/UL
LYMPHOCYTES NFR BLD AUTO: 35.2 %
MAN DIFF?: NORMAL
MCHC RBC-ENTMCNC: 29 PG
MCHC RBC-ENTMCNC: 33 GM/DL
MCV RBC AUTO: 87.9 FL
MONOCYTES # BLD AUTO: 0.39 K/UL
MONOCYTES NFR BLD AUTO: 8 %
NEUTROPHILS # BLD AUTO: 2.67 K/UL
NEUTROPHILS NFR BLD AUTO: 54.6 %
PLATELET # BLD AUTO: 201 K/UL
POTASSIUM SERPL-SCNC: 4.1 MMOL/L
PROT SERPL-MCNC: 8.6 G/DL
RBC # BLD: 5.14 M/UL
RBC # FLD: 13.8 %
SODIUM SERPL-SCNC: 140 MMOL/L
T4 FREE SERPL-MCNC: 1 NG/DL
TSH SERPL-ACNC: 0.91 UIU/ML
WBC # FLD AUTO: 4.89 K/UL

## 2018-05-20 ENCOUNTER — EMERGENCY (EMERGENCY)
Facility: HOSPITAL | Age: 43
LOS: 1 days | Discharge: HOME | End: 2018-05-20
Attending: EMERGENCY MEDICINE | Admitting: EMERGENCY MEDICINE
Payer: MEDICAID

## 2018-05-20 ENCOUNTER — TRANSCRIPTION ENCOUNTER (OUTPATIENT)
Age: 43
End: 2018-05-20

## 2018-05-20 VITALS
OXYGEN SATURATION: 97 % | HEART RATE: 74 BPM | DIASTOLIC BLOOD PRESSURE: 74 MMHG | RESPIRATION RATE: 15 BRPM | SYSTOLIC BLOOD PRESSURE: 120 MMHG

## 2018-05-20 VITALS
WEIGHT: 179.9 LBS | RESPIRATION RATE: 16 BRPM | TEMPERATURE: 98 F | OXYGEN SATURATION: 97 % | DIASTOLIC BLOOD PRESSURE: 69 MMHG | SYSTOLIC BLOOD PRESSURE: 110 MMHG | HEART RATE: 72 BPM | HEIGHT: 68 IN

## 2018-05-20 PROCEDURE — 99283 EMERGENCY DEPT VISIT LOW MDM: CPT

## 2018-05-20 NOTE — ED ADULT TRIAGE NOTE - CHIEF COMPLAINT QUOTE
"The shower door shattered and fell on my toe. Urgent Care sent me here." Injury left 2nd toe at about 2PM.

## 2018-05-20 NOTE — ED ADULT NURSE NOTE - OBJECTIVE STATEMENT
shower door shattered and cut foot shower door shattered and cut foot ecchymosis to toe and small superficial laceration to toe with minimal active bleeding

## 2018-05-20 NOTE — ED PROVIDER NOTE - OBJECTIVE STATEMENT
43 y/o M pt with history of anxiety presents to the ED c/o left 2nd toe pain s/p his shower door shattered and the trunk of it landed on his left foot. Pt went to urgent care [facility] and had x-rays which were negative for a fracture. Denies numbness/tingling. No other injuries. No further complaints at this time.

## 2018-05-20 NOTE — ED ADULT NURSE REASSESSMENT NOTE - NS ED NURSE REASSESS COMMENT FT1
pt cleaned and dsd to toe by md  pt re evaluated by md and to be d'c/d  pt discharged stable and ambulatory in nad at present d/c instruction reinforced and pt verbalized understanding vital signs as charted  pt offered and declined motrin wants percocet but driving pt advised to follow up podiatry

## 2018-05-20 NOTE — ED PROVIDER NOTE - MEDICAL DECISION MAKING DETAILS
43 yo male with crush injury to toe. No fx on outside xray done today. No suturable lac. Toenail intact. Plan - Bacitracin dressing. Podiatry follow up. Rx Percocet.

## 2018-05-21 ENCOUNTER — APPOINTMENT (OUTPATIENT)
Dept: NEUROLOGY | Facility: CLINIC | Age: 43
End: 2018-05-21
Payer: MEDICAID

## 2018-05-21 VITALS
SYSTOLIC BLOOD PRESSURE: 110 MMHG | HEART RATE: 72 BPM | RESPIRATION RATE: 17 BRPM | WEIGHT: 176 LBS | BODY MASS INDEX: 26.67 KG/M2 | TEMPERATURE: 98.1 F | DIASTOLIC BLOOD PRESSURE: 68 MMHG | HEIGHT: 68 IN | OXYGEN SATURATION: 97 %

## 2018-05-21 DIAGNOSIS — F45.8 OTHER SOMATOFORM DISORDERS: ICD-10-CM

## 2018-05-21 PROCEDURE — 99205 OFFICE O/P NEW HI 60 MIN: CPT

## 2018-05-21 RX ORDER — METHSCOPOLAMINE BROMIDE 2.5 MG/1
2.5 TABLET ORAL
Refills: 0 | Status: DISCONTINUED | COMMUNITY
End: 2018-05-21

## 2018-05-21 RX ORDER — METHSCOPOLAMINE BROMIDE 2.5 MG/1
2.5 TABLET ORAL TWICE DAILY
Qty: 60 | Refills: 1 | Status: DISCONTINUED | COMMUNITY
Start: 2018-01-31 | End: 2018-05-21

## 2018-05-21 RX ORDER — ESCITALOPRAM OXALATE 20 MG/1
20 TABLET ORAL DAILY
Qty: 30 | Refills: 3 | Status: DISCONTINUED | COMMUNITY
Start: 2017-08-09 | End: 2018-05-21

## 2018-05-21 RX ORDER — MIRTAZAPINE 7.5 MG/1
7.5 TABLET, FILM COATED ORAL
Qty: 30 | Refills: 0 | Status: DISCONTINUED | COMMUNITY
Start: 2018-01-25 | End: 2018-05-21

## 2018-05-21 RX ORDER — PSYLLIUM HUSK 20 MG/1
CAPSULE ORAL
Refills: 0 | Status: DISCONTINUED | COMMUNITY
End: 2018-05-21

## 2018-05-21 RX ORDER — ESCITALOPRAM OXALATE 5 MG/1
5 TABLET ORAL
Qty: 15 | Refills: 0 | Status: DISCONTINUED | COMMUNITY
Start: 2018-04-26 | End: 2018-05-21

## 2018-05-22 DIAGNOSIS — R69 ILLNESS, UNSPECIFIED: ICD-10-CM

## 2018-05-22 PROBLEM — F45.8 HYPERVENTILATION SYNDROME: Status: ACTIVE | Noted: 2018-05-22

## 2018-06-19 ENCOUNTER — APPOINTMENT (OUTPATIENT)
Dept: GASTROENTEROLOGY | Facility: HOSPITAL | Age: 43
End: 2018-06-19

## 2018-07-08 ENCOUNTER — RX RENEWAL (OUTPATIENT)
Age: 43
End: 2018-07-08

## 2018-07-22 PROBLEM — M47.817 LUMBOSACRAL SPONDYLOSIS: Status: ACTIVE | Noted: 2018-01-17

## 2018-07-23 ENCOUNTER — TRANSCRIPTION ENCOUNTER (OUTPATIENT)
Age: 43
End: 2018-07-23

## 2018-07-24 ENCOUNTER — MOBILE ON CALL (OUTPATIENT)
Age: 43
End: 2018-07-24

## 2018-07-24 ENCOUNTER — RESULT REVIEW (OUTPATIENT)
Age: 43
End: 2018-07-24

## 2018-07-24 ENCOUNTER — OUTPATIENT (OUTPATIENT)
Dept: OUTPATIENT SERVICES | Facility: HOSPITAL | Age: 43
LOS: 1 days | End: 2018-07-24
Payer: MEDICAID

## 2018-07-24 ENCOUNTER — OTHER (OUTPATIENT)
Age: 43
End: 2018-07-24

## 2018-07-24 ENCOUNTER — APPOINTMENT (OUTPATIENT)
Dept: GASTROENTEROLOGY | Facility: HOSPITAL | Age: 43
End: 2018-07-24
Payer: MEDICAID

## 2018-07-24 DIAGNOSIS — K25.3 ACUTE GASTRIC ULCER WITHOUT HEMORRHAGE OR PERFORATION: ICD-10-CM

## 2018-07-24 DIAGNOSIS — R10.9 UNSPECIFIED ABDOMINAL PAIN: ICD-10-CM

## 2018-07-24 PROCEDURE — 43239 EGD BIOPSY SINGLE/MULTIPLE: CPT

## 2018-07-24 PROCEDURE — 88305 TISSUE EXAM BY PATHOLOGIST: CPT | Mod: 26

## 2018-07-24 PROCEDURE — 88312 SPECIAL STAINS GROUP 1: CPT

## 2018-07-24 PROCEDURE — 88313 SPECIAL STAINS GROUP 2: CPT | Mod: 26

## 2018-07-24 PROCEDURE — 88313 SPECIAL STAINS GROUP 2: CPT

## 2018-07-24 PROCEDURE — 88312 SPECIAL STAINS GROUP 1: CPT | Mod: 26

## 2018-07-24 PROCEDURE — 88305 TISSUE EXAM BY PATHOLOGIST: CPT

## 2018-07-25 ENCOUNTER — OTHER (OUTPATIENT)
Age: 43
End: 2018-07-25

## 2018-07-25 LAB — SURGICAL PATHOLOGY FINAL REPORT - CH: SIGNIFICANT CHANGE UP

## 2018-08-14 ENCOUNTER — APPOINTMENT (OUTPATIENT)
Dept: INTERNAL MEDICINE | Facility: CLINIC | Age: 43
End: 2018-08-14
Payer: MEDICAID

## 2018-08-14 VITALS
DIASTOLIC BLOOD PRESSURE: 70 MMHG | BODY MASS INDEX: 26.84 KG/M2 | HEIGHT: 67.5 IN | TEMPERATURE: 98.1 F | SYSTOLIC BLOOD PRESSURE: 96 MMHG | OXYGEN SATURATION: 98 % | WEIGHT: 173 LBS | HEART RATE: 77 BPM

## 2018-08-14 DIAGNOSIS — R35.0 FREQUENCY OF MICTURITION: ICD-10-CM

## 2018-08-14 PROCEDURE — 99214 OFFICE O/P EST MOD 30 MIN: CPT

## 2018-08-14 NOTE — PHYSICAL EXAM
[No Acute Distress] : no acute distress [Well-Appearing] : well-appearing [Normal Voice/Communication] : normal voice/communication [No Edema] : there was no peripheral edema [Soft] : abdomen soft [Non Tender] : non-tender [No HSM] : no HSM [Normal Bowel Sounds] : normal bowel sounds [Normal Supraclavicular Nodes] : no supraclavicular lymphadenopathy [Normal Posterior Cervical Nodes] : no posterior cervical lymphadenopathy [Normal Anterior Cervical Nodes] : no anterior cervical lymphadenopathy [No CVA Tenderness] : no CVA  tenderness [Normal Gait] : normal gait [Speech Grossly Normal] : speech grossly normal [Alert and Oriented x3] : oriented to person, place, and time [Normal Mood] : the mood was normal [Normal Insight/Judgement] : insight and judgment were intact

## 2018-08-14 NOTE — ASSESSMENT
[FreeTextEntry1] : discussed w pt \par \par reviewed current rx \par reviewed recent GI workup w Dr Loredo , cont PPI and further f/u as scheduled for likely IBs, try dicyclomine prn for symptoms \par \par counseled on chronic benzodiazepine use, he has significant chronic anxiety and stressed. he stopped seeing psychiatry clinic and advised he needs to see new MD to continue to approve use of clonazepam. he plans to slowly taper his use of this. \par discussed his insomnia and he requests zolpidem, last prescribed years ago. he is aware of risks, not to take w alcohol or w clonazepam. will give him supply for use while traveling, checked  site \par \par for urinary frequency symptoms, unclear if related to anxiety or excessive water drinking. though he describes mild reduced urine stream and there may be component of BPH related symptoms. for now it is not very bothersome, will check UA , culture. consider urology eval \par \par RTO 1-2 months for full physical w labs for further review of above issues or call earlier prn

## 2018-08-14 NOTE — REVIEW OF SYSTEMS
[Fever] : no fever [Fatigue] : fatigue [Night Sweats] : no night sweats [Recent Change In Weight] : ~T no recent weight change [Abdominal Pain] : abdominal pain [Nausea] : no nausea [Vomiting] : no vomiting [Melena] : no melena [Dysuria] : no dysuria [Incontinence] : no incontinence [Hesitancy] : no hesitancy [Nocturia] : nocturia [Hematuria] : no hematuria [Frequency] : frequency [Suicidal] : not suicidal [Insomnia] : insomnia [Anxiety] : anxiety [Depression] : no depression [Negative] : Heme/Lymph

## 2018-08-14 NOTE — HISTORY OF PRESENT ILLNESS
[de-identified] : presents for f/u visit to review current rx and few concerns. \par \par following w Dr Loredo for GI workup for recurrent abdominal cramping, loose stools intermittently, likely IBS. he has just started trying dicyclomine prn. EGD, colonoscopy noted, w gastric ulcers which improved on high dose SSRI\par \par he is concerned about frequent urination, often at night, occasionally has reduced stream. no dysuria, hematuria or odor. he is not sexually active for past 1 year, has had a UTI in the past. \par \par chronic anxiety disorder on clonazepam, he has tried to reduce use, and he stopped seeing the psychiatry clinic for counseling and rx renewals. he will be trying to find another psychiatrist. he is having issues w chronic insomnia due to anxiety and family issues at home. has taken zolpidem in the past which helped and he would like to renew, he will be traveling soon and he is desperate to sleep when he can. he is aware of risks of rx and is trying to reduce regular use of clonazepam.

## 2018-08-16 LAB
APPEARANCE: CLEAR
BACTERIA UR CULT: NORMAL
BILIRUBIN URINE: NEGATIVE
BLOOD URINE: NEGATIVE
C TRACH RRNA SPEC QL NAA+PROBE: NOT DETECTED
COLOR: YELLOW
GLUCOSE QUALITATIVE U: NEGATIVE MG/DL
KETONES URINE: NEGATIVE
LEUKOCYTE ESTERASE URINE: NEGATIVE
N GONORRHOEA RRNA SPEC QL NAA+PROBE: NOT DETECTED
NITRITE URINE: NEGATIVE
PH URINE: 7.5
PROTEIN URINE: NEGATIVE MG/DL
SOURCE AMPLIFICATION: NORMAL
SPECIFIC GRAVITY URINE: 1.02
UROBILINOGEN URINE: NEGATIVE MG/DL

## 2018-08-20 ENCOUNTER — APPOINTMENT (OUTPATIENT)
Dept: NEUROLOGY | Facility: CLINIC | Age: 43
End: 2018-08-20

## 2018-09-12 ENCOUNTER — LABORATORY RESULT (OUTPATIENT)
Age: 43
End: 2018-09-12

## 2018-09-12 ENCOUNTER — APPOINTMENT (OUTPATIENT)
Dept: GASTROENTEROLOGY | Facility: CLINIC | Age: 43
End: 2018-09-12
Payer: MEDICAID

## 2018-09-12 VITALS
WEIGHT: 161 LBS | HEIGHT: 67.5 IN | BODY MASS INDEX: 24.98 KG/M2 | DIASTOLIC BLOOD PRESSURE: 77 MMHG | TEMPERATURE: 97.9 F | OXYGEN SATURATION: 96 % | SYSTOLIC BLOOD PRESSURE: 121 MMHG | HEART RATE: 71 BPM

## 2018-09-12 DIAGNOSIS — R11.2 NAUSEA WITH VOMITING, UNSPECIFIED: ICD-10-CM

## 2018-09-12 DIAGNOSIS — R68.83 CHILLS (WITHOUT FEVER): ICD-10-CM

## 2018-09-12 PROCEDURE — 99215 OFFICE O/P EST HI 40 MIN: CPT

## 2018-09-12 RX ORDER — PANTOPRAZOLE 40 MG/1
40 TABLET, DELAYED RELEASE ORAL DAILY
Qty: 30 | Refills: 2 | Status: DISCONTINUED | COMMUNITY
Start: 2018-03-13 | End: 2018-09-12

## 2018-09-13 ENCOUNTER — OTHER (OUTPATIENT)
Age: 43
End: 2018-09-13

## 2018-09-13 ENCOUNTER — APPOINTMENT (OUTPATIENT)
Dept: INTERNAL MEDICINE | Facility: CLINIC | Age: 43
End: 2018-09-13
Payer: MEDICAID

## 2018-09-13 ENCOUNTER — NON-APPOINTMENT (OUTPATIENT)
Age: 43
End: 2018-09-13

## 2018-09-13 VITALS
HEART RATE: 66 BPM | WEIGHT: 161 LBS | BODY MASS INDEX: 24.98 KG/M2 | SYSTOLIC BLOOD PRESSURE: 100 MMHG | OXYGEN SATURATION: 99 % | DIASTOLIC BLOOD PRESSURE: 70 MMHG | HEIGHT: 67.5 IN | TEMPERATURE: 97.6 F

## 2018-09-13 DIAGNOSIS — R10.30 LOWER ABDOMINAL PAIN, UNSPECIFIED: ICD-10-CM

## 2018-09-13 DIAGNOSIS — M51.26 OTHER INTERVERTEBRAL DISC DISPLACEMENT, LUMBAR REGION: ICD-10-CM

## 2018-09-13 LAB
ALBUMIN SERPL ELPH-MCNC: 4.7 G/DL
ALP BLD-CCNC: 66 U/L
ALT SERPL-CCNC: 18 U/L
AMYLASE/CREAT SERPL: 41 U/L
ANION GAP SERPL CALC-SCNC: 11 MMOL/L
AST SERPL-CCNC: 19 U/L
BASOPHILS # BLD AUTO: 0.02 K/UL
BASOPHILS NFR BLD AUTO: 0.4 %
BILIRUB SERPL-MCNC: 0.3 MG/DL
BUN SERPL-MCNC: 14 MG/DL
CALCIUM SERPL-MCNC: 10.3 MG/DL
CHLORIDE SERPL-SCNC: 101 MMOL/L
CO2 SERPL-SCNC: 28 MMOL/L
CREAT SERPL-MCNC: 1.11 MG/DL
CRP SERPL-MCNC: 0.11 MG/DL
EOSINOPHIL # BLD AUTO: 0.06 K/UL
EOSINOPHIL NFR BLD AUTO: 1.1 %
ERYTHROCYTE [SEDIMENTATION RATE] IN BLOOD BY WESTERGREN METHOD: 12 MM/HR
FOLATE SERPL-MCNC: 3.3 NG/ML
GLUCOSE SERPL-MCNC: 88 MG/DL
HCT VFR BLD CALC: 49.5 %
HGB BLD-MCNC: 16.6 G/DL
IMM GRANULOCYTES NFR BLD AUTO: 0.2 %
LPL SERPL-CCNC: 31 U/L
LYMPHOCYTES # BLD AUTO: 1.88 K/UL
LYMPHOCYTES NFR BLD AUTO: 35.8 %
MAN DIFF?: NORMAL
MCHC RBC-ENTMCNC: 28 PG
MCHC RBC-ENTMCNC: 33.5 GM/DL
MCV RBC AUTO: 83.6 FL
MONOCYTES # BLD AUTO: 0.48 K/UL
MONOCYTES NFR BLD AUTO: 9.1 %
MPO AB + PR3 PNL SER: NORMAL
NEUTROPHILS # BLD AUTO: 2.8 K/UL
NEUTROPHILS NFR BLD AUTO: 53.4 %
PLATELET # BLD AUTO: 188 K/UL
POTASSIUM SERPL-SCNC: 5 MMOL/L
PROT SERPL-MCNC: 7.4 G/DL
RBC # BLD: 5.92 M/UL
RBC # FLD: 13.1 %
SODIUM SERPL-SCNC: 140 MMOL/L
TSH SERPL-ACNC: 1.4 UIU/ML
VIT B12 SERPL-MCNC: 470 PG/ML
WBC # FLD AUTO: 5.25 K/UL

## 2018-09-13 PROCEDURE — 93000 ELECTROCARDIOGRAM COMPLETE: CPT

## 2018-09-13 PROCEDURE — 36415 COLL VENOUS BLD VENIPUNCTURE: CPT

## 2018-09-13 PROCEDURE — G0444 DEPRESSION SCREEN ANNUAL: CPT

## 2018-09-13 PROCEDURE — 99396 PREV VISIT EST AGE 40-64: CPT | Mod: 25

## 2018-09-13 NOTE — ASSESSMENT
[FreeTextEntry1] : discussed w pt \par reviewed in detail his recent workup w Dr Loredo , labs ,etc \par he has had significant weight loss w decreased appetite of unclear etiology thus far. \par we discussed the possibility of psychological component to be evaluated if there is no evidence of abnormalities on imaging - he has significant chronic anxiety condition which has not been very well addressed.\par referred him to a new psychiatrist - he has had difficulty finding someone who accepts his insurance \par will check additional labs for routine lipids and also HIV screening due to the weight loss - he says he has had no sexual relations in about 2 years. \par he will make appt w new psychiatrist \par \par CT imaging as scheduled this week , will be reviewed by Dr Loredo and myself when available and will cont to follow up with him \par \par he declines any vaccines \par \par chronic back pain unchanged \par \par RTO few weeks for f/u or earlier prn if any new concerns

## 2018-09-13 NOTE — HEALTH RISK ASSESSMENT
[No falls in past year] : Patient reported no falls in the past year [HIV Test offered] : HIV Test offered [Financial] : financial [Alone] : lives alone [Unemployed] : unemployed [Single] : single [Fully functional (bathing, dressing, toileting, transferring, walking, feeding)] : Fully functional (bathing, dressing, toileting, transferring, walking, feeding) [] : No [de-identified] : - marijuana use

## 2018-09-13 NOTE — PHYSICAL EXAM
[No Acute Distress] : no acute distress [Well Nourished] : well nourished [Well Developed] : well developed [Well-Appearing] : well-appearing [Normal Voice/Communication] : normal voice/communication [Normal Sclera/Conjunctiva] : normal sclera/conjunctiva [PERRL] : pupils equal round and reactive to light [EOMI] : extraocular movements intact [Normal Outer Ear/Nose] : the outer ears and nose were normal in appearance [Normal Oropharynx] : the oropharynx was normal [Normal TMs] : both tympanic membranes were normal [No JVD] : no jugular venous distention [Supple] : supple [No Lymphadenopathy] : no lymphadenopathy [Thyroid Normal, No Nodules] : the thyroid was normal and there were no nodules present [No Respiratory Distress] : no respiratory distress  [Clear to Auscultation] : lungs were clear to auscultation bilaterally [No Accessory Muscle Use] : no accessory muscle use [Normal Rate] : normal rate  [Regular Rhythm] : with a regular rhythm [Normal S1, S2] : normal S1 and S2 [No Murmur] : no murmur heard [No Carotid Bruits] : no carotid bruits [No Abdominal Bruit] : a ~M bruit was not heard ~T in the abdomen [No Varicosities] : no varicosities [Pedal Pulses Present] : the pedal pulses are present [No Edema] : there was no peripheral edema [No Extremity Clubbing/Cyanosis] : no extremity clubbing/cyanosis [Soft] : abdomen soft [Non Tender] : non-tender [Non-distended] : non-distended [No Masses] : no abdominal mass palpated [No HSM] : no HSM [Normal Bowel Sounds] : normal bowel sounds [Normal Supraclavicular Nodes] : no supraclavicular lymphadenopathy [Normal Posterior Cervical Nodes] : no posterior cervical lymphadenopathy [Normal Anterior Cervical Nodes] : no anterior cervical lymphadenopathy [No Spinal Tenderness] : no spinal tenderness [No Joint Swelling] : no joint swelling [Grossly Normal Strength/Tone] : grossly normal strength/tone [No Rash] : no rash [Normal Gait] : normal gait [Coordination Grossly Intact] : coordination grossly intact [No Focal Deficits] : no focal deficits [Normal Affect] : the affect was normal [Normal Insight/Judgement] : insight and judgment were intact [de-identified] : somewhat depressed mood

## 2018-09-13 NOTE — REVIEW OF SYSTEMS
[Fatigue] : fatigue [Recent Change In Weight] : ~T recent weight change [Nausea] : nausea [Negative] : Heme/Lymph [Fever] : no fever [Chills] : no chills [Night Sweats] : no night sweats [Diarrhea] : no diarrhea [Heartburn] : no heartburn [Melena] : no melena

## 2018-09-13 NOTE — COUNSELING
[Healthy eating counseling provided] : healthy eating [Activity counseling provided] : activity [ - Annual Depression Screening] : Annual Depression Screening

## 2018-09-13 NOTE — HISTORY OF PRESENT ILLNESS
[de-identified] : 44 y/o man w chronic anxiety presents for annual physical exam. recent history and workup significant for 10-15 lb unintentional over past few weeks to months. he notes his appetite is poor, has vomited on couple of occasions for no apparent reason. following w Dr Loredo GI and extensive workup undertaken including repeat EGD showing resolution of gastric ulcers, lab testing returning unremarkable including normal celiac testing. he is now scheduled for a CT abd/pelvis and abd US for further evaluation. he has no specific pain in abd but does note recurrent bloating sensation diffusely over anterior abd. bowel function is normal for him currently. the dicyclomine was not effective for him. he notes that regular marijuana use helps his appetite mildly. \par \par chronic anxiety on clonazepam prn which he is trying to take sparingly. he is trying to find a new psychiatrist with whom to follow long-term as he has been unhappy in the previous clinics where he was managed.

## 2018-09-14 ENCOUNTER — OUTPATIENT (OUTPATIENT)
Dept: OUTPATIENT SERVICES | Facility: HOSPITAL | Age: 43
LOS: 1 days | End: 2018-09-14
Payer: MEDICAID

## 2018-09-14 ENCOUNTER — APPOINTMENT (OUTPATIENT)
Dept: CT IMAGING | Facility: HOSPITAL | Age: 43
End: 2018-09-14
Payer: MEDICAID

## 2018-09-14 DIAGNOSIS — Z00.8 ENCOUNTER FOR OTHER GENERAL EXAMINATION: ICD-10-CM

## 2018-09-14 LAB
BAKER'S YEAST AB QL: 21.5 UNITS
BAKER'S YEAST IGA QL IA: 10.8 UNITS
BAKER'S YEAST IGA QN IA: NEGATIVE
BAKER'S YEAST IGG QN IA: ABNORMAL

## 2018-09-14 PROCEDURE — 74177 CT ABD & PELVIS W/CONTRAST: CPT

## 2018-09-14 PROCEDURE — 74177 CT ABD & PELVIS W/CONTRAST: CPT | Mod: 26

## 2018-09-16 LAB — CAROTENE SERPL-MCNC: 2 MCG/DL

## 2018-09-18 ENCOUNTER — OTHER (OUTPATIENT)
Age: 43
End: 2018-09-18

## 2018-09-18 LAB
ANNOTATION COMMENT IMP: NORMAL
HLA-DQ2: NEGATIVE
HLA-DQ8 QL: NEGATIVE
REF LAB TEST METHOD: NORMAL

## 2018-09-21 ENCOUNTER — APPOINTMENT (OUTPATIENT)
Dept: ULTRASOUND IMAGING | Facility: HOSPITAL | Age: 43
End: 2018-09-21
Payer: MEDICAID

## 2018-09-21 ENCOUNTER — OUTPATIENT (OUTPATIENT)
Dept: OUTPATIENT SERVICES | Facility: HOSPITAL | Age: 43
LOS: 1 days | End: 2018-09-21
Payer: MEDICAID

## 2018-09-21 DIAGNOSIS — R68.83 CHILLS (WITHOUT FEVER): ICD-10-CM

## 2018-09-21 PROCEDURE — 76700 US EXAM ABDOM COMPLETE: CPT

## 2018-09-21 PROCEDURE — 76700 US EXAM ABDOM COMPLETE: CPT | Mod: 26

## 2018-09-27 LAB
CHOLEST SERPL-MCNC: 215 MG/DL
CHOLEST/HDLC SERPL: 6 RATIO
HDLC SERPL-MCNC: 36 MG/DL
HIV1+2 AB SPEC QL IA.RAPID: NONREACTIVE
LDLC SERPL CALC-MCNC: 146 MG/DL
TRIGL SERPL-MCNC: 165 MG/DL

## 2018-10-01 ENCOUNTER — FORM ENCOUNTER (OUTPATIENT)
Age: 43
End: 2018-10-01

## 2018-10-01 ENCOUNTER — OUTPATIENT (OUTPATIENT)
Dept: OUTPATIENT SERVICES | Facility: HOSPITAL | Age: 43
LOS: 1 days | End: 2018-10-01

## 2018-10-02 ENCOUNTER — APPOINTMENT (OUTPATIENT)
Dept: NUCLEAR MEDICINE | Facility: HOSPITAL | Age: 43
End: 2018-10-02
Payer: MEDICAID

## 2018-10-02 ENCOUNTER — OUTPATIENT (OUTPATIENT)
Dept: OUTPATIENT SERVICES | Facility: HOSPITAL | Age: 43
LOS: 1 days | End: 2018-10-02

## 2018-10-02 DIAGNOSIS — R11.2 NAUSEA WITH VOMITING, UNSPECIFIED: ICD-10-CM

## 2018-10-02 PROCEDURE — 78264 GASTRIC EMPTYING IMG STUDY: CPT | Mod: 26

## 2018-10-09 ENCOUNTER — APPOINTMENT (OUTPATIENT)
Dept: MRI IMAGING | Facility: HOSPITAL | Age: 43
End: 2018-10-09
Payer: MEDICAID

## 2018-10-09 ENCOUNTER — OUTPATIENT (OUTPATIENT)
Dept: OUTPATIENT SERVICES | Facility: HOSPITAL | Age: 43
LOS: 1 days | End: 2018-10-09
Payer: MEDICAID

## 2018-10-09 ENCOUNTER — OTHER (OUTPATIENT)
Age: 43
End: 2018-10-09

## 2018-10-09 DIAGNOSIS — Z00.8 ENCOUNTER FOR OTHER GENERAL EXAMINATION: ICD-10-CM

## 2018-10-09 PROCEDURE — 74183 MRI ABD W/O CNTR FLWD CNTR: CPT | Mod: 26

## 2018-10-09 PROCEDURE — 72197 MRI PELVIS W/O & W/DYE: CPT

## 2018-10-09 PROCEDURE — 74183 MRI ABD W/O CNTR FLWD CNTR: CPT

## 2018-10-09 PROCEDURE — A9579: CPT

## 2018-10-09 PROCEDURE — 72197 MRI PELVIS W/O & W/DYE: CPT | Mod: 26

## 2018-10-10 ENCOUNTER — APPOINTMENT (OUTPATIENT)
Dept: GASTROENTEROLOGY | Facility: CLINIC | Age: 43
End: 2018-10-10
Payer: MEDICAID

## 2018-10-10 VITALS
OXYGEN SATURATION: 99 % | BODY MASS INDEX: 24.48 KG/M2 | HEIGHT: 67 IN | HEART RATE: 60 BPM | WEIGHT: 156 LBS | SYSTOLIC BLOOD PRESSURE: 106 MMHG | DIASTOLIC BLOOD PRESSURE: 72 MMHG | TEMPERATURE: 98.2 F

## 2018-10-10 DIAGNOSIS — R10.84 GENERALIZED ABDOMINAL PAIN: ICD-10-CM

## 2018-10-10 PROCEDURE — 99215 OFFICE O/P EST HI 40 MIN: CPT

## 2018-10-10 RX ORDER — CLONAZEPAM 1 MG/1
1 TABLET ORAL
Qty: 60 | Refills: 0 | Status: DISCONTINUED | COMMUNITY
Start: 2017-04-26 | End: 2018-10-10

## 2018-10-11 ENCOUNTER — OTHER (OUTPATIENT)
Age: 43
End: 2018-10-11

## 2018-10-15 DIAGNOSIS — Z71.89 OTHER SPECIFIED COUNSELING: ICD-10-CM

## 2018-10-30 ENCOUNTER — APPOINTMENT (OUTPATIENT)
Dept: PSYCHIATRY | Facility: CLINIC | Age: 43
End: 2018-10-30
Payer: MEDICAID

## 2018-10-30 PROCEDURE — 90792 PSYCH DIAG EVAL W/MED SRVCS: CPT

## 2018-11-14 ENCOUNTER — APPOINTMENT (OUTPATIENT)
Dept: GASTROENTEROLOGY | Facility: CLINIC | Age: 43
End: 2018-11-14
Payer: MEDICAID

## 2018-11-14 VITALS
HEART RATE: 66 BPM | WEIGHT: 155 LBS | SYSTOLIC BLOOD PRESSURE: 103 MMHG | OXYGEN SATURATION: 97 % | DIASTOLIC BLOOD PRESSURE: 64 MMHG | TEMPERATURE: 98 F | HEIGHT: 67 IN | BODY MASS INDEX: 24.33 KG/M2

## 2018-11-14 DIAGNOSIS — Z87.19 PERSONAL HISTORY OF OTHER DISEASES OF THE DIGESTIVE SYSTEM: ICD-10-CM

## 2018-11-14 PROCEDURE — 99214 OFFICE O/P EST MOD 30 MIN: CPT

## 2018-11-15 ENCOUNTER — APPOINTMENT (OUTPATIENT)
Dept: PSYCHIATRY | Facility: CLINIC | Age: 43
End: 2018-11-15
Payer: MEDICAID

## 2018-11-15 PROCEDURE — 99214 OFFICE O/P EST MOD 30 MIN: CPT

## 2018-12-13 ENCOUNTER — APPOINTMENT (OUTPATIENT)
Dept: PSYCHIATRY | Facility: CLINIC | Age: 43
End: 2018-12-13
Payer: MEDICAID

## 2018-12-13 PROCEDURE — 99213 OFFICE O/P EST LOW 20 MIN: CPT

## 2019-01-10 ENCOUNTER — APPOINTMENT (OUTPATIENT)
Dept: PSYCHIATRY | Facility: CLINIC | Age: 44
End: 2019-01-10
Payer: MEDICAID

## 2019-01-10 PROCEDURE — 99214 OFFICE O/P EST MOD 30 MIN: CPT

## 2019-01-11 ENCOUNTER — RECORD ABSTRACTING (OUTPATIENT)
Age: 44
End: 2019-01-11

## 2019-01-11 DIAGNOSIS — Z81.8 FAMILY HISTORY OF OTHER MENTAL AND BEHAVIORAL DISORDERS: ICD-10-CM

## 2019-01-15 ENCOUNTER — MEDICATION RENEWAL (OUTPATIENT)
Age: 44
End: 2019-01-15

## 2019-01-30 ENCOUNTER — APPOINTMENT (OUTPATIENT)
Dept: INTERNAL MEDICINE | Facility: CLINIC | Age: 44
End: 2019-01-30
Payer: MEDICAID

## 2019-01-30 VITALS
BODY MASS INDEX: 22.29 KG/M2 | WEIGHT: 142 LBS | HEIGHT: 67 IN | HEART RATE: 63 BPM | DIASTOLIC BLOOD PRESSURE: 80 MMHG | OXYGEN SATURATION: 98 % | TEMPERATURE: 98.8 F | SYSTOLIC BLOOD PRESSURE: 110 MMHG

## 2019-01-30 PROCEDURE — 36415 COLL VENOUS BLD VENIPUNCTURE: CPT

## 2019-01-30 PROCEDURE — 99214 OFFICE O/P EST MOD 30 MIN: CPT | Mod: 25

## 2019-01-30 NOTE — HISTORY OF PRESENT ILLNESS
[de-identified] : presents for f/u visit to review his current status. he is not feeling well in general. his father passed away earlier thismonth after rapid declines over past year due to Alzheimers dementia. pt has been noting frequent panic attacks w chest tightness intermittently, nonexertional. \par chronic anxiety disorder on clonazepam, now following w psychologist, psychiatrist and on sertraline 50 mg qd. he has insomnia and had requested zolpidem to help him sleep as he has been very stressed and anxious since his fathers passing. \par he continues to lose weight - now a total of about 40 lbs since about one year ago. \par extensive GI evaluation w Dr Jang reviewed in detail w no significant findings. he likely has IBS and takes Librax prn for GI symptoms.

## 2019-01-30 NOTE — ASSESSMENT
[FreeTextEntry1] : discussed w pt \par \par counseled him on his fathers recent passing. he needs further psychological support. \par he may need to increase dose of sertraline. advised him to see his psychiatrist. \par check repeat labs as below, overall unremarkable 3 months ago.\par his continued weight loss is most likely related to his underlying anxiety disorder and this needs to be addressed fully. his workup w Dr Facundo MONTOYA was reviewed. he likely has IBS. cont Librax prn. \par \par he may cont to take clonazepam prn. advised caution w zolpidem. \par he is very concerned about palpitations and potential cardiac symptoms and he wants to see cardiologist. will refer for consult, but advised him that likely his symptoms are not cardiac in nature. \par \par again advised him to have psychological counseling and support, he is going through bereavement process which is exacerbating his underlying anxiety problem. \par \par RTO 1-2 months for f/u or earlier prn if any new concerns

## 2019-01-30 NOTE — PHYSICAL EXAM
[Normal Voice/Communication] : normal voice/communication [No JVD] : no jugular venous distention [No Lymphadenopathy] : no lymphadenopathy [No Respiratory Distress] : no respiratory distress  [Clear to Auscultation] : lungs were clear to auscultation bilaterally [No Accessory Muscle Use] : no accessory muscle use [Normal Rate] : normal rate  [Regular Rhythm] : with a regular rhythm [Normal S1, S2] : normal S1 and S2 [No Murmur] : no murmur heard [No Carotid Bruits] : no carotid bruits [No Edema] : there was no peripheral edema [Soft] : abdomen soft [Non Tender] : non-tender [Non-distended] : non-distended [No Masses] : no abdominal mass palpated [No HSM] : no HSM [Normal Bowel Sounds] : normal bowel sounds [Normal Supraclavicular Nodes] : no supraclavicular lymphadenopathy [Normal Posterior Cervical Nodes] : no posterior cervical lymphadenopathy [Normal Anterior Cervical Nodes] : no anterior cervical lymphadenopathy [Normal Gait] : normal gait [Coordination Grossly Intact] : coordination grossly intact [Alert and Oriented x3] : oriented to person, place, and time [Normal Insight/Judgement] : insight and judgment were intact [de-identified] : fatigued, weight loss is apparent  [de-identified] : appears anxious

## 2019-01-30 NOTE — REVIEW OF SYSTEMS
[Fatigue] : fatigue [Recent Change In Weight] : ~T recent weight change [Palpitations] : palpitations [Insomnia] : insomnia [Anxiety] : anxiety [Depression] : depression [Negative] : Heme/Lymph [Fever] : no fever [Lower Ext Edema] : no lower extremity edema [Orthopena] : no orthopnea [Suicidal] : not suicidal

## 2019-02-07 ENCOUNTER — APPOINTMENT (OUTPATIENT)
Dept: PSYCHIATRY | Facility: CLINIC | Age: 44
End: 2019-02-07
Payer: MEDICAID

## 2019-02-07 ENCOUNTER — MEDICATION RENEWAL (OUTPATIENT)
Age: 44
End: 2019-02-07

## 2019-02-07 DIAGNOSIS — Z20.828 CONTACT WITH AND (SUSPECTED) EXPOSURE TO OTHER VIRAL COMMUNICABLE DISEASES: ICD-10-CM

## 2019-02-07 DIAGNOSIS — Z63.4 DISAPPEARANCE AND DEATH OF FAMILY MEMBER: ICD-10-CM

## 2019-02-07 PROCEDURE — 99215 OFFICE O/P EST HI 40 MIN: CPT

## 2019-02-07 RX ORDER — CLONAZEPAM 1 MG/1
1 TABLET ORAL TWICE DAILY
Qty: 60 | Refills: 0 | Status: DISCONTINUED | COMMUNITY
Start: 2019-01-15 | End: 2019-02-07

## 2019-02-07 RX ORDER — GABAPENTIN 300 MG/1
300 CAPSULE ORAL
Refills: 0 | Status: DISCONTINUED | COMMUNITY
End: 2019-02-07

## 2019-02-07 SDOH — SOCIAL STABILITY - SOCIAL INSECURITY: DISSAPEARANCE AND DEATH OF FAMILY MEMBER: Z63.4

## 2019-02-08 RX ORDER — SERTRALINE HYDROCHLORIDE 50 MG/1
50 TABLET, FILM COATED ORAL
Refills: 0 | Status: DISCONTINUED | COMMUNITY
End: 2019-02-08

## 2019-02-08 RX ORDER — SERTRALINE HYDROCHLORIDE 100 MG/1
100 TABLET, FILM COATED ORAL DAILY
Qty: 30 | Refills: 2 | Status: DISCONTINUED | COMMUNITY
Start: 2019-02-07 | End: 2019-02-08

## 2019-02-26 ENCOUNTER — NON-APPOINTMENT (OUTPATIENT)
Age: 44
End: 2019-02-26

## 2019-02-26 ENCOUNTER — APPOINTMENT (OUTPATIENT)
Dept: CARDIOLOGY | Facility: CLINIC | Age: 44
End: 2019-02-26
Payer: MEDICAID

## 2019-02-26 VITALS
BODY MASS INDEX: 22.6 KG/M2 | DIASTOLIC BLOOD PRESSURE: 79 MMHG | WEIGHT: 144 LBS | HEART RATE: 61 BPM | SYSTOLIC BLOOD PRESSURE: 109 MMHG | OXYGEN SATURATION: 98 % | HEIGHT: 67 IN

## 2019-02-26 DIAGNOSIS — Z87.898 PERSONAL HISTORY OF OTHER SPECIFIED CONDITIONS: ICD-10-CM

## 2019-02-26 DIAGNOSIS — R06.09 OTHER FORMS OF DYSPNEA: ICD-10-CM

## 2019-02-26 PROCEDURE — 99204 OFFICE O/P NEW MOD 45 MIN: CPT

## 2019-02-26 PROCEDURE — 93000 ELECTROCARDIOGRAM COMPLETE: CPT

## 2019-02-26 RX ORDER — CHLORDIAZEPOXIDE HYDROCHLORIDE AND CLIDINIUM BROMIDE 5; 2.5 MG/1; MG/1
5-2.5 CAPSULE, GELATIN COATED ORAL
Qty: 90 | Refills: 2 | Status: DISCONTINUED | COMMUNITY
Start: 2018-10-10 | End: 2019-02-26

## 2019-02-26 RX ORDER — OSELTAMIVIR PHOSPHATE 75 MG/1
75 CAPSULE ORAL
Qty: 10 | Refills: 0 | Status: DISCONTINUED | COMMUNITY
Start: 2019-02-07 | End: 2019-02-26

## 2019-03-05 ENCOUNTER — APPOINTMENT (OUTPATIENT)
Dept: CARDIOLOGY | Facility: CLINIC | Age: 44
End: 2019-03-05
Payer: MEDICAID

## 2019-03-05 PROCEDURE — 93268 ECG RECORD/REVIEW: CPT

## 2019-03-06 ENCOUNTER — APPOINTMENT (OUTPATIENT)
Dept: CARDIOLOGY | Facility: CLINIC | Age: 44
End: 2019-03-06
Payer: MEDICAID

## 2019-03-06 PROCEDURE — 93306 TTE W/DOPPLER COMPLETE: CPT

## 2019-03-14 ENCOUNTER — APPOINTMENT (OUTPATIENT)
Dept: INTERNAL MEDICINE | Facility: CLINIC | Age: 44
End: 2019-03-14
Payer: MEDICAID

## 2019-03-14 VITALS
OXYGEN SATURATION: 98 % | TEMPERATURE: 98.4 F | HEART RATE: 72 BPM | DIASTOLIC BLOOD PRESSURE: 60 MMHG | HEIGHT: 67 IN | SYSTOLIC BLOOD PRESSURE: 90 MMHG | WEIGHT: 147 LBS | BODY MASS INDEX: 23.07 KG/M2

## 2019-03-14 DIAGNOSIS — R63.4 ABNORMAL WEIGHT LOSS: ICD-10-CM

## 2019-03-14 LAB
ALBUMIN SERPL ELPH-MCNC: 4.7 G/DL
ALP BLD-CCNC: 71 U/L
ALT SERPL-CCNC: 13 U/L
ANION GAP SERPL CALC-SCNC: 11 MMOL/L
AST SERPL-CCNC: 22 U/L
BASOPHILS # BLD AUTO: 0.01 K/UL
BASOPHILS NFR BLD AUTO: 0.1 %
BILIRUB SERPL-MCNC: 0.2 MG/DL
BUN SERPL-MCNC: 17 MG/DL
CALCIUM SERPL-MCNC: 9.8 MG/DL
CHLORIDE SERPL-SCNC: 99 MMOL/L
CO2 SERPL-SCNC: 27 MMOL/L
CREAT SERPL-MCNC: 1.02 MG/DL
EOSINOPHIL # BLD AUTO: 0 K/UL
EOSINOPHIL NFR BLD AUTO: 0 %
ERYTHROCYTE [SEDIMENTATION RATE] IN BLOOD BY WESTERGREN METHOD: 21 MM/HR
FOLATE SERPL-MCNC: 2.1 NG/ML
GLUCOSE SERPL-MCNC: 97 MG/DL
HCT VFR BLD CALC: 47 %
HGB BLD-MCNC: 15.8 G/DL
IMM GRANULOCYTES NFR BLD AUTO: 0.2 %
LYMPHOCYTES # BLD AUTO: 1.2 K/UL
LYMPHOCYTES NFR BLD AUTO: 10.6 %
MAN DIFF?: NORMAL
MCHC RBC-ENTMCNC: 28.8 PG
MCHC RBC-ENTMCNC: 33.6 GM/DL
MCV RBC AUTO: 85.8 FL
MONOCYTES # BLD AUTO: 0.56 K/UL
MONOCYTES NFR BLD AUTO: 5 %
NEUTROPHILS # BLD AUTO: 9.5 K/UL
NEUTROPHILS NFR BLD AUTO: 84.1 %
PLATELET # BLD AUTO: 207 K/UL
POTASSIUM SERPL-SCNC: 5.2 MMOL/L
PROT SERPL-MCNC: 7.8 G/DL
RBC # BLD: 5.48 M/UL
RBC # FLD: 12.9 %
SODIUM SERPL-SCNC: 137 MMOL/L
T4 FREE SERPL-MCNC: 0.9 NG/DL
TSH SERPL-ACNC: 0.6 UIU/ML
VIT B12 SERPL-MCNC: 491 PG/ML
WBC # FLD AUTO: 11.29 K/UL

## 2019-03-14 PROCEDURE — 99214 OFFICE O/P EST MOD 30 MIN: CPT

## 2019-03-17 PROBLEM — R63.4 ABNORMAL WEIGHT LOSS: Status: ACTIVE | Noted: 2018-09-12

## 2019-03-17 NOTE — HISTORY OF PRESENT ILLNESS
[de-identified] : presents for f/u visit to evaluate ongoing anxiety symptoms and review of labs. chronic anxiety disorder managed on SSRI currently, sertraline dose increased to 100 mg daily. following w psychiatrist however he recently had an issue w request for benzodiazepines. he has been dependent on these in the past, his current psychiatrist did not want to prescribe alprazolam for him - he says he has been obtaining the rx for a friend due to severe symptoms. he has been through bereavement for his father over past few weeks. he feels he is improving overall. \par he has regained few lbs over past few weeks after significant weight loss over past year, extensive GI evaluation w Dr Loredo and no specific etiology found. his weight loss may have been mostly due to his anxiety and mental health status.\par he is undergoing cardiology eval w Dr Shelby for episodes of chest pain, palpitations, scheduled for stress testing. evaluation unremarkable thus far. \par chronic insomnia on zolpidem

## 2019-03-17 NOTE — REVIEW OF SYSTEMS
[Palpitations] : palpitations [Recent Change In Weight] : ~T recent weight change [Anxiety] : anxiety [Insomnia] : insomnia [Depression] : depression [Negative] : Heme/Lymph [Lower Ext Edema] : no lower extremity edema [Paroysmal Nocturnal Dyspnea] : no paroysmal nocturnal dyspnea [Orthopena] : no orthopnea [Wheezing] : no wheezing [Shortness Of Breath] : no shortness of breath [Cough] : no cough [Abdominal Pain] : no abdominal pain [Memory Loss] : no memory loss [Headache] : no headache [Dizziness] : no dizziness [Suicidal] : not suicidal

## 2019-03-17 NOTE — ASSESSMENT
[FreeTextEntry1] : discussed w pt \par \par reviewed current labs. noted borderline WBC elevation, will monitor this, likely not clinically significant currentl. ESR not significantly elevated. also noted low folate level, likely due to poor oral intake. discussed supplementation, he declines to take an oral supplements, but he plans to increase foods rich in folate, discussed. he has regained some weight over few weeks, he seems to be slowly improving in terms of his chronic severe anxiety.\par \par discussed the anxiety and rx in detail - he has been taking alprazolam prn from a friend. discussed the danger of this in detail w him. concerned that he will cont to obtain this illicitly. will reluctantly give him supply of alprazolam for now - he clearly has a significant chronic anxiety  problem which likely caused his significant weight loss. checked  site. referred to new psychiatrist at his request as he will need to find someone to develop a good relationship with in order to adequately address his anxiety. cont SSRI \par \par RTO 1-2 months for f/u or earlier prn if any new concerns

## 2019-03-17 NOTE — PHYSICAL EXAM
[No Acute Distress] : no acute distress [No JVD] : no jugular venous distention [Normal Voice/Communication] : normal voice/communication [No Lymphadenopathy] : no lymphadenopathy [Supple] : supple [No Respiratory Distress] : no respiratory distress  [Clear to Auscultation] : lungs were clear to auscultation bilaterally [Normal Rate] : normal rate  [No Accessory Muscle Use] : no accessory muscle use [Regular Rhythm] : with a regular rhythm [Normal S1, S2] : normal S1 and S2 [No Murmur] : no murmur heard [Grossly Normal Strength/Tone] : grossly normal strength/tone [No Edema] : there was no peripheral edema [Normal Gait] : normal gait [Coordination Grossly Intact] : coordination grossly intact [Alert and Oriented x3] : oriented to person, place, and time [Speech Grossly Normal] : speech grossly normal [Normal Insight/Judgement] : insight and judgment were intact

## 2019-03-22 ENCOUNTER — APPOINTMENT (OUTPATIENT)
Dept: CARDIOLOGY | Facility: CLINIC | Age: 44
End: 2019-03-22

## 2019-04-01 ENCOUNTER — APPOINTMENT (OUTPATIENT)
Dept: ORTHOPEDIC SURGERY | Facility: CLINIC | Age: 44
End: 2019-04-01

## 2019-04-12 ENCOUNTER — APPOINTMENT (OUTPATIENT)
Dept: CARDIOLOGY | Facility: CLINIC | Age: 44
End: 2019-04-12
Payer: MEDICAID

## 2019-04-12 PROCEDURE — 93015 CV STRESS TEST SUPVJ I&R: CPT

## 2019-05-01 ENCOUNTER — OTHER (OUTPATIENT)
Age: 44
End: 2019-05-01

## 2019-05-09 ENCOUNTER — OTHER (OUTPATIENT)
Age: 44
End: 2019-05-09

## 2019-05-21 ENCOUNTER — TRANSCRIPTION ENCOUNTER (OUTPATIENT)
Age: 44
End: 2019-05-21

## 2019-05-21 ENCOUNTER — APPOINTMENT (OUTPATIENT)
Dept: INTERNAL MEDICINE | Facility: CLINIC | Age: 44
End: 2019-05-21
Payer: MEDICAID

## 2019-05-21 VITALS
HEIGHT: 67 IN | SYSTOLIC BLOOD PRESSURE: 90 MMHG | OXYGEN SATURATION: 98 % | BODY MASS INDEX: 23.23 KG/M2 | WEIGHT: 148 LBS | TEMPERATURE: 98.9 F | DIASTOLIC BLOOD PRESSURE: 60 MMHG | HEART RATE: 64 BPM

## 2019-05-21 DIAGNOSIS — R39.198 OTHER DIFFICULTIES WITH MICTURITION: ICD-10-CM

## 2019-05-21 PROCEDURE — 99214 OFFICE O/P EST MOD 30 MIN: CPT

## 2019-05-29 ENCOUNTER — MEDICATION RENEWAL (OUTPATIENT)
Age: 44
End: 2019-05-29

## 2019-05-29 LAB
APPEARANCE: CLEAR
BACTERIA UR CULT: NORMAL
BILIRUBIN URINE: NEGATIVE
BLOOD URINE: NEGATIVE
COLOR: YELLOW
GLUCOSE QUALITATIVE U: NEGATIVE
KETONES URINE: NEGATIVE
LEUKOCYTE ESTERASE URINE: NEGATIVE
NITRITE URINE: NEGATIVE
PH URINE: 6
PROTEIN URINE: NORMAL
SPECIFIC GRAVITY URINE: 1.03
UROBILINOGEN URINE: NORMAL

## 2019-06-05 ENCOUNTER — APPOINTMENT (OUTPATIENT)
Dept: GASTROENTEROLOGY | Facility: CLINIC | Age: 44
End: 2019-06-05
Payer: MEDICAID

## 2019-06-05 VITALS
WEIGHT: 143 LBS | HEART RATE: 61 BPM | SYSTOLIC BLOOD PRESSURE: 109 MMHG | BODY MASS INDEX: 22.44 KG/M2 | OXYGEN SATURATION: 98 % | DIASTOLIC BLOOD PRESSURE: 74 MMHG | HEIGHT: 67 IN

## 2019-06-05 PROCEDURE — 99214 OFFICE O/P EST MOD 30 MIN: CPT

## 2019-06-05 NOTE — PHYSICAL EXAM
[General Appearance - Alert] : alert [General Appearance - In No Acute Distress] : in no acute distress [General Appearance - Well Nourished] : well nourished [General Appearance - Well Developed] : well developed [General Appearance - Well-Appearing] : healthy appearing [Sclera] : the sclera and conjunctiva were normal [Neck Appearance] : the appearance of the neck was normal [Neck Cervical Mass (___cm)] : no neck mass was observed [Jugular Venous Distention Increased] : there was no jugular-venous distention [Auscultation Breath Sounds / Voice Sounds] : lungs were clear to auscultation bilaterally [Heart Rate And Rhythm] : heart rate was normal and rhythm regular [Heart Sounds] : normal S1 and S2 [Heart Sounds Gallop] : no gallops [Murmurs] : no murmurs [Heart Sounds Pericardial Friction Rub] : no pericardial rub [Full Pulse] : the pedal pulses are present [Edema] : there was no peripheral edema [Bowel Sounds] : normal bowel sounds [Abdomen Soft] : soft [Abdomen Tenderness] : non-tender [Abdomen Mass (___ Cm)] : no abdominal mass palpated [Cervical Lymph Nodes Enlarged Posterior Bilaterally] : posterior cervical [Cervical Lymph Nodes Enlarged Anterior Bilaterally] : anterior cervical [Supraclavicular Lymph Nodes Enlarged Bilaterally] : supraclavicular [Axillary Lymph Nodes Enlarged Bilaterally] : axillary [Femoral Lymph Nodes Enlarged Bilaterally] : femoral [Inguinal Lymph Nodes Enlarged Bilaterally] : inguinal [Abnormal Walk] : normal gait [Nail Clubbing] : no clubbing  or cyanosis of the fingernails [Musculoskeletal - Swelling] : no joint swelling seen [Motor Tone] : muscle strength and tone were normal [Skin Color & Pigmentation] : normal skin color and pigmentation [Skin Turgor] : normal skin turgor [] : no rash [Oriented To Time, Place, And Person] : oriented to person, place, and time [Impaired Insight] : insight and judgment were intact [Affect] : the affect was normal

## 2019-06-05 NOTE — ASSESSMENT
[FreeTextEntry1] : Impression\par \par Likely diagnosis of irritable bowel syndrome\par \par He tells me recent trip to the ophthalmologists he was told there was some possible inflammation of his eyes and he was asked if he had any inflammatory bowel disease\par \par He's never had capsule endoscopy\par \par CAT scan of the abdomen was normal\par \par Marginal benefit from Librax once a day\par \par Marginal benefit from Zoloft 100 mg a day under the care of a psychiatrist for anxiety and depression\par \par Definite benefit from marijuana\par \par Suggest\par \par Increase Librax to 2 times a day and if tolerated and no side effect taken up to 3 times a day if he continues to have benefit and no side effects\par \par Continue to follow up with psychiatrist with use of Zoloft or other medication if needed\par \par He has an appointment to see a physician with describing all of her medical marijuana \par \par He will schedule a small bowel capsule endoscopy to look for evidence of inflammatory bowel disease such as Crohn's disease or other pathology\par \par Small bowel capsule endoscopy its risks benefits and alternatives were reviewed with the patient in great detail included but not limited to the possibility of small bowel retention\par \par The patient can call or return anytime sooner as needed\par \par I spent 25 minutes or more with the patient with at least 50% of the time for educational purposes

## 2019-06-05 NOTE — HISTORY OF PRESENT ILLNESS
[de-identified] : Dr. Castro Take care of this very pleasant 43-year-old gentleman.\par \par He seems to be having significant abdominal symptoms from irritable bowel syndrome.\par \par Previous workup essentially has been normal except for a small peptic ulcer that was documented to heal.\par \par He's on Librax, he is taking only one a day. He seems to have some marginal benefit from it. He hasn't tried taking it more frequently.\par \par He is under the care of a psychiatrist for anxiety and depression and taking Zoloft 100 mg a day we'll also with some marginal improvement with this abdominal symptoms.\par \par He smokes marijuana or and finds that this helps his symptoms also.\par \par His pain attention to his diet very carefully. He hasn't found any particular food groups to be more of a problem then another period is no milk intolerance. No gluten intolerance and no fatty food intolerance\par \par His weight seems to be stable

## 2019-06-05 NOTE — REASON FOR VISIT
[FreeTextEntry1] : Patient continues to have abdominal discomfort presumed from irritable bowel syndrome

## 2019-06-10 ENCOUNTER — RX RENEWAL (OUTPATIENT)
Age: 44
End: 2019-06-10

## 2019-06-19 ENCOUNTER — OTHER (OUTPATIENT)
Age: 44
End: 2019-06-19

## 2019-06-19 ENCOUNTER — APPOINTMENT (OUTPATIENT)
Dept: GASTROENTEROLOGY | Facility: CLINIC | Age: 44
End: 2019-06-19

## 2019-06-26 ENCOUNTER — OTHER (OUTPATIENT)
Age: 44
End: 2019-06-26

## 2019-06-26 ENCOUNTER — APPOINTMENT (OUTPATIENT)
Dept: GASTROENTEROLOGY | Facility: CLINIC | Age: 44
End: 2019-06-26
Payer: MEDICAID

## 2019-06-26 PROCEDURE — 91110 GI TRC IMG INTRAL ESOPH-ILE: CPT

## 2019-08-05 ENCOUNTER — APPOINTMENT (OUTPATIENT)
Dept: NEUROLOGY | Facility: CLINIC | Age: 44
End: 2019-08-05
Payer: MEDICAID

## 2019-08-05 VITALS
OXYGEN SATURATION: 98 % | TEMPERATURE: 98.1 F | HEART RATE: 56 BPM | HEIGHT: 67 IN | BODY MASS INDEX: 21.82 KG/M2 | DIASTOLIC BLOOD PRESSURE: 60 MMHG | WEIGHT: 139 LBS | SYSTOLIC BLOOD PRESSURE: 100 MMHG | RESPIRATION RATE: 15 BRPM

## 2019-08-05 DIAGNOSIS — G43.909 MIGRAINE, UNSPECIFIED, NOT INTRACTABLE, W/OUT STATUS MIGRAINOSUS: ICD-10-CM

## 2019-08-05 PROCEDURE — 99214 OFFICE O/P EST MOD 30 MIN: CPT

## 2019-08-05 NOTE — REVIEW OF SYSTEMS
[Recent Weight Loss (___ Lbs)] : recent [unfilled] ~Ulb weight loss [As Noted in HPI] : as noted in HPI [Heartburn] : heartburn [Negative] : Genitourinary

## 2019-08-05 NOTE — PHYSICAL EXAM
[FreeTextEntry1] : Alert and oriented. No cognitive or communication deficits. The visual fields are full to confrontation. There is a slight 1 mm anisocoria with left pupil larger than the right. Both constrict to light. Extraocular movements are conjugate. Smile is symmetric. No hearing loss. Neck is supple. No sensory motor deficits.

## 2019-08-05 NOTE — ASSESSMENT
[FreeTextEntry1] : His headaches have migrainous features and I advised a trial of sumatriptan at onset of headache. Potential adverse affects were reviewed. Advised starting coenzyme Q 10 200 mg daily for migraine prophylaxis. 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 I feel the small meningioma can be monitored with a repeat contrast MRI in 6 months.\par \par Return for followup in 6 weeks

## 2019-08-05 NOTE — HISTORY OF PRESENT ILLNESS
[FreeTextEntry1] :  44-year-old man seen 14-1/2 months ago with complaints of headache, lightheadedness, insomnia, anxiety and history of polydrug abuse, benzodiazepine dependence, daily use of marijuana which he claims helps his headaches. Headaches  now described as starting in the left orbital region and occur daily and associated with photophobia. He recently saw her ophthalmologist who found anisocoria with the left pupil larger than the right. An MRI of the brain and orbits pre-and post contrast was obtained and a 9 mm enhancing mass adjacent to the left clinoid and planum sphenoidale was seen.

## 2019-08-06 ENCOUNTER — APPOINTMENT (OUTPATIENT)
Dept: INTERNAL MEDICINE | Facility: CLINIC | Age: 44
End: 2019-08-06
Payer: MEDICAID

## 2019-08-06 VITALS
SYSTOLIC BLOOD PRESSURE: 98 MMHG | TEMPERATURE: 98 F | BODY MASS INDEX: 22.29 KG/M2 | HEIGHT: 67 IN | HEART RATE: 52 BPM | WEIGHT: 142 LBS | OXYGEN SATURATION: 98 % | DIASTOLIC BLOOD PRESSURE: 64 MMHG

## 2019-08-06 DIAGNOSIS — F13.20 SEDATIVE, HYPNOTIC OR ANXIOLYTIC DEPENDENCE, UNCOMPLICATED: ICD-10-CM

## 2019-08-06 PROCEDURE — 99215 OFFICE O/P EST HI 40 MIN: CPT

## 2019-08-07 ENCOUNTER — APPOINTMENT (OUTPATIENT)
Dept: SPINE | Facility: CLINIC | Age: 44
End: 2019-08-07
Payer: MEDICAID

## 2019-08-07 VITALS
SYSTOLIC BLOOD PRESSURE: 113 MMHG | HEIGHT: 67 IN | BODY MASS INDEX: 21.82 KG/M2 | DIASTOLIC BLOOD PRESSURE: 75 MMHG | HEART RATE: 69 BPM | WEIGHT: 139 LBS

## 2019-08-07 PROCEDURE — 99204 OFFICE O/P NEW MOD 45 MIN: CPT

## 2019-08-07 RX ORDER — SUMATRIPTAN 100 MG/1
100 TABLET, FILM COATED ORAL
Qty: 9 | Refills: 2 | Status: DISCONTINUED | COMMUNITY
Start: 2019-08-05 | End: 2019-08-07

## 2019-08-07 RX ORDER — CETIRIZINE HCL 10 MG
10 TABLET ORAL
Refills: 0 | Status: DISCONTINUED | COMMUNITY
End: 2019-08-07

## 2019-08-07 RX ORDER — METOCLOPRAMIDE 10 MG/1
10 TABLET ORAL
Qty: 1 | Refills: 0 | Status: DISCONTINUED | COMMUNITY
Start: 2019-06-05 | End: 2019-08-07

## 2019-08-07 RX ORDER — UBIDECARENONE 200 MG
200 CAPSULE ORAL
Qty: 100 | Refills: 2 | Status: DISCONTINUED | COMMUNITY
Start: 2019-08-05 | End: 2019-08-07

## 2019-08-09 ENCOUNTER — OTHER (OUTPATIENT)
Age: 44
End: 2019-08-09

## 2019-08-11 PROBLEM — F13.20 BENZODIAZEPINE DEPENDENCE: Status: ACTIVE | Noted: 2018-05-22

## 2019-08-11 NOTE — HISTORY OF PRESENT ILLNESS
[de-identified] : presents for f/u visit to review his recent progress with chronic medical issues and recent finding of likely brain meningioma. recent workup w ophtho for persistent L orbital region headaches led to MRI which shows 9 mm mass adjacent to L clinoid and planum sphenoidale. had consult w Dr Ramirez neuro , reviewed. neurosurgical eval was advised, he has appt w Dr Amado. \par \par since dx of this finding, pt says he has been much more 'at peace' and has accepted some things that he previously had difficulty dealing with. \par he has chronic severe anxiety but feels this is much better controlled. he would like to discuss rx. he admits he has been taking alprazolam 1 mg qhs obtained from a friend for many months. he has reduced this recently as he feels much better. he is going on a trip soon and feels he needs rx to help keep his anxiety controlled. he has had significant problems seeing a psychiatrist due to insurance coverage, and his usual therapist will no longer be available for him as she is moving. \par he has regained some weight , appetite improved \par \par he continues smoking marijuana regularly

## 2019-08-11 NOTE — PHYSICAL EXAM
[Normal] : no acute distress, well nourished, well developed and well-appearing [Coordination Grossly Intact] : coordination grossly intact [Normal Gait] : normal gait [Normal Affect] : the affect was normal [Alert and Oriented x3] : oriented to person, place, and time [Normal Insight/Judgement] : insight and judgment were intact

## 2019-08-11 NOTE — ASSESSMENT
[FreeTextEntry1] : discussed w pt in detail\par \par reviewed recent MRI brain \par reviewed neurology consult \par he has appt w neurosurgery Dr Amado \par we discussed the possibility of surgical intervention, he has accepted this \par \par his anxiety seems to be improved, he is more 'at peace ' w himself \par he cannot find a psychiatrist who accepts his insurance, will cont to try to assist him \par he admits and I have suspected that he has obtained alprazolam from a non prescriber source , in the setting of benzo dependence. however he is not abusing or selling and he has true underlying anxiety disorder\par he is going on a long trip soon and I would prefer he have a lower dose available from me and not to obtain from someone else for the time being. will give him 0.5 mg dose bid prn for now. checked  site. will cont to work on obtaining a psychiatrist for the long term \par he is in a much better place psychologically compared to few months ago \par he has regained some weight \par \par discussed and counseled in detail\par \par RTO 1-2 months for f/u or earlier prn, will follow his progress after neurosurgical consult

## 2019-08-19 ENCOUNTER — RX RENEWAL (OUTPATIENT)
Age: 44
End: 2019-08-19

## 2019-08-21 ENCOUNTER — RX RENEWAL (OUTPATIENT)
Age: 44
End: 2019-08-21

## 2019-08-22 ENCOUNTER — OTHER (OUTPATIENT)
Age: 44
End: 2019-08-22

## 2019-08-23 ENCOUNTER — RX RENEWAL (OUTPATIENT)
Age: 44
End: 2019-08-23

## 2019-09-16 ENCOUNTER — APPOINTMENT (OUTPATIENT)
Dept: NEUROLOGY | Facility: CLINIC | Age: 44
End: 2019-09-16

## 2019-09-18 ENCOUNTER — MEDICATION RENEWAL (OUTPATIENT)
Age: 44
End: 2019-09-18

## 2019-09-20 ENCOUNTER — APPOINTMENT (OUTPATIENT)
Dept: INTERNAL MEDICINE | Facility: CLINIC | Age: 44
End: 2019-09-20
Payer: MEDICAID

## 2019-09-20 VITALS
DIASTOLIC BLOOD PRESSURE: 60 MMHG | RESPIRATION RATE: 14 BRPM | HEART RATE: 87 BPM | BODY MASS INDEX: 23.23 KG/M2 | TEMPERATURE: 97.8 F | OXYGEN SATURATION: 98 % | HEIGHT: 67 IN | SYSTOLIC BLOOD PRESSURE: 94 MMHG | WEIGHT: 148 LBS

## 2019-09-20 DIAGNOSIS — J06.9 ACUTE UPPER RESPIRATORY INFECTION, UNSPECIFIED: ICD-10-CM

## 2019-09-20 PROCEDURE — 99213 OFFICE O/P EST LOW 20 MIN: CPT

## 2019-09-20 NOTE — REVIEW OF SYSTEMS
[Fever] : no fever [Chills] : no chills [Night Sweats] : no night sweats [Postnasal Drip] : no postnasal drip [Discharge] : no discharge [Sore Throat] : no sore throat [Nasal Discharge] : no nasal discharge [Hoarseness] : no hoarseness [Chest Pain] : no chest pain [Wheezing] : no wheezing [Shortness Of Breath] : no shortness of breath [Vomiting] : no vomiting [Cough] : cough

## 2019-09-20 NOTE — HISTORY OF PRESENT ILLNESS
[FreeTextEntry8] : presents for eval of cough w mild yellow sputum production x 4-5 days since returning from a cruise recently. mild fatigue, no dyspnea, fevers or chills. \par scheduled for surgical excision of meningioma w Dr Amado next month \par his chronic anxiety seems very well controlled recently, taking rx as prescribed

## 2019-09-20 NOTE — PHYSICAL EXAM
[Normal Sclera/Conjunctiva] : normal sclera/conjunctiva [Normal Outer Ear/Nose] : the outer ears and nose were normal in appearance [Normal TMs] : both tympanic membranes were normal [Normal Oropharynx] : the oropharynx was normal [No Lymphadenopathy] : no lymphadenopathy [Normal] : no respiratory distress, lungs were clear to auscultation bilaterally and no accessory muscle use [Normal Anterior Cervical Nodes] : no anterior cervical lymphadenopathy [Normal Posterior Cervical Nodes] : no posterior cervical lymphadenopathy [Normal Supraclavicular Nodes] : no supraclavicular lymphadenopathy [Normal Gait] : normal gait [Normal Affect] : the affect was normal [Normal Mood] : the mood was normal [Normal Insight/Judgement] : insight and judgment were intact

## 2019-10-01 ENCOUNTER — OUTPATIENT (OUTPATIENT)
Dept: OUTPATIENT SERVICES | Facility: HOSPITAL | Age: 44
LOS: 1 days | End: 2019-10-01
Payer: MEDICAID

## 2019-10-07 ENCOUNTER — OUTPATIENT (OUTPATIENT)
Dept: OUTPATIENT SERVICES | Facility: HOSPITAL | Age: 44
LOS: 1 days | End: 2019-10-07
Payer: MEDICAID

## 2019-10-07 ENCOUNTER — APPOINTMENT (OUTPATIENT)
Dept: INTERNAL MEDICINE | Facility: CLINIC | Age: 44
End: 2019-10-07
Payer: MEDICAID

## 2019-10-07 VITALS
BODY MASS INDEX: 23.54 KG/M2 | HEIGHT: 67 IN | WEIGHT: 150 LBS | OXYGEN SATURATION: 98 % | HEART RATE: 74 BPM | SYSTOLIC BLOOD PRESSURE: 90 MMHG | TEMPERATURE: 98.1 F | DIASTOLIC BLOOD PRESSURE: 54 MMHG

## 2019-10-07 VITALS
TEMPERATURE: 98 F | HEIGHT: 67 IN | SYSTOLIC BLOOD PRESSURE: 101 MMHG | WEIGHT: 149.91 LBS | HEART RATE: 85 BPM | OXYGEN SATURATION: 100 % | DIASTOLIC BLOOD PRESSURE: 66 MMHG | RESPIRATION RATE: 15 BRPM

## 2019-10-07 DIAGNOSIS — Z01.818 ENCOUNTER FOR OTHER PREPROCEDURAL EXAMINATION: ICD-10-CM

## 2019-10-07 DIAGNOSIS — D32.9 BENIGN NEOPLASM OF MENINGES, UNSPECIFIED: ICD-10-CM

## 2019-10-07 DIAGNOSIS — F41.1 GENERALIZED ANXIETY DISORDER: ICD-10-CM

## 2019-10-07 DIAGNOSIS — Z98.890 OTHER SPECIFIED POSTPROCEDURAL STATES: Chronic | ICD-10-CM

## 2019-10-07 LAB
ANION GAP SERPL CALC-SCNC: 11 MMOL/L — SIGNIFICANT CHANGE UP (ref 5–17)
BLD GP AB SCN SERPL QL: NEGATIVE — SIGNIFICANT CHANGE UP
BUN SERPL-MCNC: 15 MG/DL — SIGNIFICANT CHANGE UP (ref 7–23)
CALCIUM SERPL-MCNC: 9.2 MG/DL — SIGNIFICANT CHANGE UP (ref 8.4–10.5)
CHLORIDE SERPL-SCNC: 101 MMOL/L — SIGNIFICANT CHANGE UP (ref 96–108)
CO2 SERPL-SCNC: 26 MMOL/L — SIGNIFICANT CHANGE UP (ref 22–31)
CREAT SERPL-MCNC: 0.86 MG/DL — SIGNIFICANT CHANGE UP (ref 0.5–1.3)
GLUCOSE SERPL-MCNC: 92 MG/DL — SIGNIFICANT CHANGE UP (ref 70–99)
HCT VFR BLD CALC: 46.8 % — SIGNIFICANT CHANGE UP (ref 39–50)
HGB BLD-MCNC: 15.2 G/DL — SIGNIFICANT CHANGE UP (ref 13–17)
MCHC RBC-ENTMCNC: 29 PG — SIGNIFICANT CHANGE UP (ref 27–34)
MCHC RBC-ENTMCNC: 32.5 GM/DL — SIGNIFICANT CHANGE UP (ref 32–36)
MCV RBC AUTO: 89.1 FL — SIGNIFICANT CHANGE UP (ref 80–100)
PLATELET # BLD AUTO: 174 K/UL — SIGNIFICANT CHANGE UP (ref 150–400)
POTASSIUM SERPL-MCNC: 4.4 MMOL/L — SIGNIFICANT CHANGE UP (ref 3.5–5.3)
POTASSIUM SERPL-SCNC: 4.4 MMOL/L — SIGNIFICANT CHANGE UP (ref 3.5–5.3)
RBC # BLD: 5.25 M/UL — SIGNIFICANT CHANGE UP (ref 4.2–5.8)
RBC # FLD: 13.8 % — SIGNIFICANT CHANGE UP (ref 10.3–14.5)
RH IG SCN BLD-IMP: POSITIVE — SIGNIFICANT CHANGE UP
SODIUM SERPL-SCNC: 138 MMOL/L — SIGNIFICANT CHANGE UP (ref 135–145)
WBC # BLD: 6.23 K/UL — SIGNIFICANT CHANGE UP (ref 3.8–10.5)
WBC # FLD AUTO: 6.23 K/UL — SIGNIFICANT CHANGE UP (ref 3.8–10.5)

## 2019-10-07 PROCEDURE — 86850 RBC ANTIBODY SCREEN: CPT

## 2019-10-07 PROCEDURE — 80048 BASIC METABOLIC PNL TOTAL CA: CPT

## 2019-10-07 PROCEDURE — 85027 COMPLETE CBC AUTOMATED: CPT

## 2019-10-07 PROCEDURE — 87641 MR-STAPH DNA AMP PROBE: CPT

## 2019-10-07 PROCEDURE — 86901 BLOOD TYPING SEROLOGIC RH(D): CPT

## 2019-10-07 PROCEDURE — 87640 STAPH A DNA AMP PROBE: CPT

## 2019-10-07 PROCEDURE — G0463: CPT

## 2019-10-07 PROCEDURE — 86900 BLOOD TYPING SEROLOGIC ABO: CPT

## 2019-10-07 PROCEDURE — 99214 OFFICE O/P EST MOD 30 MIN: CPT

## 2019-10-07 RX ORDER — CLONAZEPAM 1 MG
1 TABLET ORAL
Qty: 0 | Refills: 0 | DISCHARGE

## 2019-10-07 RX ORDER — CEFUROXIME AXETIL 250 MG/1
250 TABLET ORAL
Qty: 14 | Refills: 0 | Status: DISCONTINUED | COMMUNITY
Start: 2019-09-20 | End: 2019-10-07

## 2019-10-07 RX ORDER — CHLORDIAZEPOXIDE HYDROCHLORIDE AND CLIDINIUM BROMIDE 5; 2.5 MG/1; MG/1
CAPSULE ORAL
Refills: 0 | Status: DISCONTINUED | COMMUNITY
End: 2019-10-07

## 2019-10-07 RX ORDER — DIPHENHYDRAMINE HCL 50 MG
2 CAPSULE ORAL
Qty: 0 | Refills: 0 | DISCHARGE

## 2019-10-07 RX ORDER — LATANOPROST 0.05 MG/ML
1 SOLUTION/ DROPS OPHTHALMIC; TOPICAL
Qty: 0 | Refills: 0 | DISCHARGE

## 2019-10-07 RX ORDER — CEFAZOLIN SODIUM 1 G
2000 VIAL (EA) INJECTION ONCE
Refills: 0 | Status: DISCONTINUED | OUTPATIENT
Start: 2019-10-17 | End: 2019-10-20

## 2019-10-07 NOTE — H&P PST ADULT - HISTORY OF PRESENT ILLNESS
43 y/o male w/ complaints of headaches x 3 years, lightheadedness, insomnia and anxiety. He was sent for a brain MRI by his ophthalmologist after noting his left pupil was larger than his right. MRI found a 9mm enhancing mass. He smokes daily marijuana which he claims helps his headaches. Presents now for transphenoidal removal of a meningioma.

## 2019-10-07 NOTE — H&P PST ADULT - ASSESSMENT
CAPRINI SCORE [CLOT updated 18]    AGE RELATED RISK FACTORS                                                       MOBILITY RELATED FACTORS  [ ] Age 41-60 years                                            (1 Point)                    [ ] Bed rest                                                        (1 Point)  [ ] Age: 61-74 years                                           (2 Points)                  [ ] Plaster cast                                                   (2 Points)  [ ] Age= 75 years                                              (3 Points)                    [ ] Bed bound for more than 72 hours                 (2 Points)    DISEASE RELATED RISK FACTORS                                               GENDER SPECIFIC FACTORS  [ ] Edema in the lower extremities                       (1 Point)              [ ] Pregnancy                                                     (1 Point)  [ ] Varicose veins                                               (1 Point)                     [ ] Post-partum < 6 weeks                                   (1 Point)             [ ] BMI > 25 Kg/m2                                            (1 Point)                     [ ] Hormonal therapy  or oral contraception          (1 Point)                 [ ] Sepsis (in the previous month)                        (1 Point)               [ ] History of pregnancy complications                 (1 point)  [ ] Pneumonia or serious lung disease                                               [ ] Unexplained or recurrent                     (1 Point)           (in the previous month)                               (1 Point)  [ ] Abnormal pulmonary function test                     (1 Point)                 SURGERY RELATED RISK FACTORS  [ ] Acute myocardial infarction                              (1 Point)               [ ]  Section                                             (1 Point)  [ ] Congestive heart failure (in the previous month)  (1 Point)      [ ] Minor surgery                                                  (1 Point)   [ ] Inflammatory bowel disease                             (1 Point)               [ ] Arthroscopic surgery                                        (2 Points)  [ ] Central venous access                                      (2 Points)                [ ] General surgery lasting more than 45 minutes (2 points)  [ ] Present or previous malignancy                     (2 Points)                [ ] Elective arthroplasty                                         (5 points)    [ ] Stroke (in the previous month)                          (5 Points)                                                                                                                                                           HEMATOLOGY RELATED FACTORS                                                 TRAUMA RELATED RISK FACTORS  [ ] Prior episodes of VTE                                     (3 Points)                [ ] Fracture of the hip, pelvis, or leg                       (5 Points)  [ ] Positive family history for VTE                         (3 Points)             [ ] Acute spinal cord injury (in the previous month)  (5 Points)  [ ] Prothrombin 08004 A                                     (3 Points)               [ ] Paralysis  (less than 1 month)                             (5 Points)  [ ] Factor V Leiden                                             (3 Points)                  [ ] Multiple Trauma within 1 month                        (5 Points)  [ ] Lupus anticoagulants                                     (3 Points)                                                           [ ] Anticardiolipin antibodies                               (3 Points)                                                       [ ] High homocysteine in the blood                      (3 Points)                                             [ ] Other congenital or acquired thrombophilia      (3 Points)                                                [ ] Heparin induced thrombocytopenia                  (3 Points)                                     Total Score [    3      ]

## 2019-10-07 NOTE — H&P PST ADULT - NSICDXPROBLEM_GEN_ALL_CORE_FT
PROBLEM DIAGNOSES  Problem: Meningioma  Assessment and Plan: Scheduled resection of meningioma   nasal swab collected  c/w medications as prescribed (will most likely take Xanax the DOS)

## 2019-10-07 NOTE — ASSESSMENT
[Patient Optimized for Surgery] : Patient optimized for surgery [ECG] : ECG [Continue medications as is] : Continue current medications [As per surgery] : as per surgery

## 2019-10-07 NOTE — PHYSICAL EXAM
[No Lymphadenopathy] : no lymphadenopathy [Supple] : supple [Normal] : normal rate, regular rhythm, normal S1 and S2 and no murmur heard [No Carotid Bruits] : no carotid bruits [Pedal Pulses Present] : the pedal pulses are present [No Edema] : there was no peripheral edema [Normal Supraclavicular Nodes] : no supraclavicular lymphadenopathy [Normal Posterior Cervical Nodes] : no posterior cervical lymphadenopathy [Normal Anterior Cervical Nodes] : no anterior cervical lymphadenopathy [Normal Gait] : normal gait [Normal Affect] : the affect was normal [Alert and Oriented x3] : oriented to person, place, and time [Normal Mood] : the mood was normal [Normal Insight/Judgement] : insight and judgment were intact

## 2019-10-07 NOTE — ASSESSMENT
[Patient Optimized for Surgery] : Patient optimized for surgery [Continue medications as is] : Continue current medications [As per surgery] : as per surgery [ECG] : ECG

## 2019-10-07 NOTE — H&P PST ADULT - NSICDXPASTMEDICALHX_GEN_ALL_CORE_FT
PAST MEDICAL HISTORY:  Anxiety     Bulging lumbar disc s/p SARITA    Chronic headaches last 3 yrs    History of IBS     Marijuana use, continuous     Meningioma

## 2019-10-08 LAB
MRSA PCR RESULT.: SIGNIFICANT CHANGE UP
S AUREUS DNA NOSE QL NAA+PROBE: SIGNIFICANT CHANGE UP

## 2019-10-09 ENCOUNTER — APPOINTMENT (OUTPATIENT)
Dept: OTOLARYNGOLOGY | Facility: CLINIC | Age: 44
End: 2019-10-09
Payer: MEDICAID

## 2019-10-09 VITALS
BODY MASS INDEX: 23.54 KG/M2 | DIASTOLIC BLOOD PRESSURE: 62 MMHG | HEIGHT: 67 IN | SYSTOLIC BLOOD PRESSURE: 98 MMHG | HEART RATE: 51 BPM | WEIGHT: 150 LBS

## 2019-10-09 DIAGNOSIS — J34.2 DEVIATED NASAL SEPTUM: ICD-10-CM

## 2019-10-09 PROCEDURE — 31231 NASAL ENDOSCOPY DX: CPT

## 2019-10-09 PROCEDURE — 99204 OFFICE O/P NEW MOD 45 MIN: CPT | Mod: 25

## 2019-10-10 NOTE — PHYSICAL EXAM
[Midline] : trachea located in midline position [Normal] : no rashes [] : septum deviated to the right

## 2019-10-10 NOTE — HISTORY OF PRESENT ILLNESS
[de-identified] : Going for TSPR with Dr. Amado 10/17/19.  \par Notes has DNS and b/l nasal congestion.  Uses Flonase daily.  No sinus pain or pressure.  Notes recurrent sinus infections.   No clear watery nasal d/c, no bleeding.  No hx of nasal sx. \par Has good sense of smell and taste.  Vision is good, uses Glasses.

## 2019-10-10 NOTE — ASSESSMENT
[FreeTextEntry1] : Planum Meningioma:\par - discussed my role in the surgery including the nasal and sinus procedures that I will be performing in order to provide the neurosurgeon access to the tumor\par - expected post-op hospital course discussed including need for repair of spinal fluid leak and close observation for 2-3 days\par - post-op care consisting of nasal saline irrigations was reviewed, and [facility] sinus pamphlet was given to patient to obtain prior to surgery so they have ready to use on discharge home\par - discussed post-operative issues including nasal congestion, loss of sense of smell which should be temporary but can be permanent, bloody nasal drainage, facial pressure/headaches, bleeding, and infection\par - all questions answered and patient will call if any further concerns\par - plan to see patient on AM of surgery

## 2019-10-10 NOTE — CONSULT LETTER
[Dear  ___] : Dear  [unfilled], [Courtesy Letter:] : I had the pleasure of seeing your patient, [unfilled], in my office today. [Please see my note below.] : Please see my note below. [Sincerely,] : Sincerely, [Consult Closing:] : Thank you very much for allowing me to participate in the care of this patient.  If you have any questions, please do not hesitate to contact me. [FreeTextEntry3] : Sury Stanford MD\par Otolaryngology and Cranial Base Surgery\par Attending Physician - Department of Otolaryngology and Head & Neck Surgery\par [facility]\par  - [employer] [facility]\par Office: [phone]\par Fax: [phone]\par

## 2019-10-11 NOTE — HISTORY OF PRESENT ILLNESS
[No Pertinent Cardiac History] : no history of aortic stenosis, atrial fibrillation, coronary artery disease, recent myocardial infarction, or implantable device/pacemaker [No Pertinent Pulmonary History] : no history of asthma, COPD, sleep apnea, or smoking [No Adverse Anesthesia Reaction] : no adverse anesthesia reaction in self or family member [(Patient denies any chest pain, claudication, dyspnea on exertion, orthopnea, palpitations or syncope)] : Patient denies any chest pain, claudication, dyspnea on exertion, orthopnea, palpitations or syncope [Excellent (>10 METs)] : Excellent (>10 METs) [Parent] : parent [Chronic Anticoagulation] : no chronic anticoagulation [Chronic Kidney Disease] : no chronic kidney disease [Diabetes] : no diabetes [FreeTextEntry1] : endoscopic transnasal resection of meningioma  [FreeTextEntry2] : 10/17/19 [FreeTextEntry3] :  - Dr Isiah Amado and Dr Stanford - [facility]  [FreeTextEntry4] : \par presents for medical evaluation prior to planned resection of meningioma via transnasal approach. he feels well currently , no new concerns. \par no history of anesthesia complications, bleeding problems, thromboembolism or cardiovascular disease. \par \par chronic anxiety well-controlled on alprazolam bid and SSRI for mood stabilization \par IBS symptoms well controlled current, on Librax \par

## 2019-10-11 NOTE — REVIEW OF SYSTEMS
[Insomnia] : insomnia [Anxiety] : anxiety [Negative] : Heme/Lymph [Suicidal] : not suicidal [Depression] : no depression

## 2019-10-11 NOTE — HISTORY OF PRESENT ILLNESS
[No Pertinent Cardiac History] : no history of aortic stenosis, atrial fibrillation, coronary artery disease, recent myocardial infarction, or implantable device/pacemaker [No Pertinent Pulmonary History] : no history of asthma, COPD, sleep apnea, or smoking [No Adverse Anesthesia Reaction] : no adverse anesthesia reaction in self or family member [(Patient denies any chest pain, claudication, dyspnea on exertion, orthopnea, palpitations or syncope)] : Patient denies any chest pain, claudication, dyspnea on exertion, orthopnea, palpitations or syncope [Excellent (>10 METs)] : Excellent (>10 METs) [Parent] : parent [Chronic Anticoagulation] : no chronic anticoagulation [Chronic Kidney Disease] : no chronic kidney disease [Diabetes] : no diabetes [FreeTextEntry1] : endoscopic transnasal resection of meningioma  [FreeTextEntry2] : 10/17/19 [FreeTextEntry3] :  - Dr Isiah Amado and Dr Stanford - [facility]  [FreeTextEntry4] : \par presents for medical evaluation prior to planned resection of meningioma via transnasal approach. he feels well currently , no new concerns. \par no history of anesthesia complications, bleeding problems, thromboembolism or cardiovascular disease. \par \par chronic anxiety well-controlled on alprazolam bid and SSRI for mood stabilization \par IBS symptoms well controlled current, on Librax \par

## 2019-10-11 NOTE — ADDENDUM
[FreeTextEntry1] : reviewed preop lab testing, all unremarkable. pt is in his optimal condition for the planned surgery as scheduled.

## 2019-10-11 NOTE — PLAN
[FreeTextEntry1] : discussed w pt \par \par pt is in optimal condition for the planned surgery, pending routine preop lab testing and EKG which is to be done later today. will write addendum re results. \par \par advised to avoid NSAIDs one weel prior to surgery \par \par he declines influenza vaccine \par \par RTO few weeks after surgery or call earlier prn if any new concerns

## 2019-10-14 ENCOUNTER — OTHER (OUTPATIENT)
Age: 44
End: 2019-10-14

## 2019-10-15 PROBLEM — M51.26 OTHER INTERVERTEBRAL DISC DISPLACEMENT, LUMBAR REGION: Chronic | Status: ACTIVE | Noted: 2019-10-07

## 2019-10-15 PROBLEM — Z87.19 PERSONAL HISTORY OF OTHER DISEASES OF THE DIGESTIVE SYSTEM: Chronic | Status: ACTIVE | Noted: 2019-10-07

## 2019-10-15 PROBLEM — R51 HEADACHE: Chronic | Status: ACTIVE | Noted: 2019-10-07

## 2019-10-15 PROBLEM — F12.90 CANNABIS USE, UNSPECIFIED, UNCOMPLICATED: Chronic | Status: ACTIVE | Noted: 2019-10-07

## 2019-10-15 PROBLEM — D32.9 BENIGN NEOPLASM OF MENINGES, UNSPECIFIED: Chronic | Status: ACTIVE | Noted: 2019-10-07

## 2019-10-16 ENCOUNTER — OUTPATIENT (OUTPATIENT)
Dept: OUTPATIENT SERVICES | Facility: HOSPITAL | Age: 44
LOS: 1 days | End: 2019-10-16
Payer: MEDICAID

## 2019-10-16 ENCOUNTER — TRANSCRIPTION ENCOUNTER (OUTPATIENT)
Age: 44
End: 2019-10-16

## 2019-10-16 ENCOUNTER — APPOINTMENT (OUTPATIENT)
Dept: MRI IMAGING | Facility: HOSPITAL | Age: 44
End: 2019-10-16

## 2019-10-16 ENCOUNTER — APPOINTMENT (OUTPATIENT)
Dept: CT IMAGING | Facility: HOSPITAL | Age: 44
End: 2019-10-16

## 2019-10-16 DIAGNOSIS — D32.9 BENIGN NEOPLASM OF MENINGES, UNSPECIFIED: ICD-10-CM

## 2019-10-16 DIAGNOSIS — Z98.890 OTHER SPECIFIED POSTPROCEDURAL STATES: Chronic | ICD-10-CM

## 2019-10-16 PROCEDURE — A9585: CPT

## 2019-10-16 PROCEDURE — 70553 MRI BRAIN STEM W/O & W/DYE: CPT

## 2019-10-16 PROCEDURE — 70450 CT HEAD/BRAIN W/O DYE: CPT

## 2019-10-16 PROCEDURE — 70450 CT HEAD/BRAIN W/O DYE: CPT | Mod: 26

## 2019-10-16 PROCEDURE — 70553 MRI BRAIN STEM W/O & W/DYE: CPT | Mod: 26

## 2019-10-17 ENCOUNTER — RESULT REVIEW (OUTPATIENT)
Age: 44
End: 2019-10-17

## 2019-10-17 ENCOUNTER — APPOINTMENT (OUTPATIENT)
Dept: OTOLARYNGOLOGY | Facility: HOSPITAL | Age: 44
End: 2019-10-17

## 2019-10-17 ENCOUNTER — APPOINTMENT (OUTPATIENT)
Dept: SPINE | Facility: HOSPITAL | Age: 44
End: 2019-10-17
Payer: MEDICAID

## 2019-10-17 ENCOUNTER — INPATIENT (INPATIENT)
Facility: HOSPITAL | Age: 44
LOS: 3 days | Discharge: HOME | DRG: 26 | End: 2019-10-21
Attending: NEUROLOGICAL SURGERY | Admitting: NEUROLOGICAL SURGERY
Payer: MEDICAID

## 2019-10-17 VITALS
TEMPERATURE: 98 F | OXYGEN SATURATION: 97 % | DIASTOLIC BLOOD PRESSURE: 73 MMHG | SYSTOLIC BLOOD PRESSURE: 105 MMHG | WEIGHT: 149.91 LBS | HEIGHT: 67 IN | RESPIRATION RATE: 18 BRPM | HEART RATE: 58 BPM

## 2019-10-17 DIAGNOSIS — Z98.890 OTHER SPECIFIED POSTPROCEDURAL STATES: Chronic | ICD-10-CM

## 2019-10-17 DIAGNOSIS — D32.9 BENIGN NEOPLASM OF MENINGES, UNSPECIFIED: ICD-10-CM

## 2019-10-17 LAB
ANION GAP SERPL CALC-SCNC: 16 MMOL/L — SIGNIFICANT CHANGE UP (ref 5–17)
ANION GAP SERPL CALC-SCNC: 18 MMOL/L — HIGH (ref 5–17)
BUN SERPL-MCNC: 10 MG/DL — SIGNIFICANT CHANGE UP (ref 7–23)
BUN SERPL-MCNC: 10 MG/DL — SIGNIFICANT CHANGE UP (ref 7–23)
CALCIUM SERPL-MCNC: 8.5 MG/DL — SIGNIFICANT CHANGE UP (ref 8.4–10.5)
CALCIUM SERPL-MCNC: 8.6 MG/DL — SIGNIFICANT CHANGE UP (ref 8.4–10.5)
CHLORIDE SERPL-SCNC: 101 MMOL/L — SIGNIFICANT CHANGE UP (ref 96–108)
CHLORIDE SERPL-SCNC: 104 MMOL/L — SIGNIFICANT CHANGE UP (ref 96–108)
CK MB BLD-MCNC: 0.9 % — SIGNIFICANT CHANGE UP (ref 0–3.5)
CK MB CFR SERPL CALC: 1.2 NG/ML — SIGNIFICANT CHANGE UP (ref 0–6.7)
CK SERPL-CCNC: 127 U/L — SIGNIFICANT CHANGE UP (ref 30–200)
CO2 SERPL-SCNC: 21 MMOL/L — LOW (ref 22–31)
CO2 SERPL-SCNC: 22 MMOL/L — SIGNIFICANT CHANGE UP (ref 22–31)
CREAT SERPL-MCNC: 0.8 MG/DL — SIGNIFICANT CHANGE UP (ref 0.5–1.3)
CREAT SERPL-MCNC: 0.8 MG/DL — SIGNIFICANT CHANGE UP (ref 0.5–1.3)
GLUCOSE BLDC GLUCOMTR-MCNC: 151 MG/DL — HIGH (ref 70–99)
GLUCOSE SERPL-MCNC: 158 MG/DL — HIGH (ref 70–99)
GLUCOSE SERPL-MCNC: 161 MG/DL — HIGH (ref 70–99)
HCT VFR BLD CALC: 44.9 % — SIGNIFICANT CHANGE UP (ref 39–50)
HCT VFR BLD CALC: 45.3 % — SIGNIFICANT CHANGE UP (ref 39–50)
HGB BLD-MCNC: 15 G/DL — SIGNIFICANT CHANGE UP (ref 13–17)
HGB BLD-MCNC: 15.2 G/DL — SIGNIFICANT CHANGE UP (ref 13–17)
MAGNESIUM SERPL-MCNC: 2.2 MG/DL — SIGNIFICANT CHANGE UP (ref 1.6–2.6)
MCHC RBC-ENTMCNC: 28.4 PG — SIGNIFICANT CHANGE UP (ref 27–34)
MCHC RBC-ENTMCNC: 28.5 PG — SIGNIFICANT CHANGE UP (ref 27–34)
MCHC RBC-ENTMCNC: 33.4 GM/DL — SIGNIFICANT CHANGE UP (ref 32–36)
MCHC RBC-ENTMCNC: 33.6 GM/DL — SIGNIFICANT CHANGE UP (ref 32–36)
MCV RBC AUTO: 84.8 FL — SIGNIFICANT CHANGE UP (ref 80–100)
MCV RBC AUTO: 85 FL — SIGNIFICANT CHANGE UP (ref 80–100)
NRBC # BLD: 0 /100 WBCS — SIGNIFICANT CHANGE UP (ref 0–0)
NRBC # BLD: 0 /100 WBCS — SIGNIFICANT CHANGE UP (ref 0–0)
PHOSPHATE SERPL-MCNC: 4.9 MG/DL — HIGH (ref 2.5–4.5)
PLATELET # BLD AUTO: 192 K/UL — SIGNIFICANT CHANGE UP (ref 150–400)
PLATELET # BLD AUTO: 193 K/UL — SIGNIFICANT CHANGE UP (ref 150–400)
POTASSIUM SERPL-MCNC: 3.2 MMOL/L — LOW (ref 3.5–5.3)
POTASSIUM SERPL-MCNC: 3.5 MMOL/L — SIGNIFICANT CHANGE UP (ref 3.5–5.3)
POTASSIUM SERPL-SCNC: 3.2 MMOL/L — LOW (ref 3.5–5.3)
POTASSIUM SERPL-SCNC: 3.5 MMOL/L — SIGNIFICANT CHANGE UP (ref 3.5–5.3)
RBC # BLD: 5.28 M/UL — SIGNIFICANT CHANGE UP (ref 4.2–5.8)
RBC # BLD: 5.34 M/UL — SIGNIFICANT CHANGE UP (ref 4.2–5.8)
RBC # FLD: 13.4 % — SIGNIFICANT CHANGE UP (ref 10.3–14.5)
RBC # FLD: 13.4 % — SIGNIFICANT CHANGE UP (ref 10.3–14.5)
RH IG SCN BLD-IMP: POSITIVE — SIGNIFICANT CHANGE UP
SODIUM SERPL-SCNC: 140 MMOL/L — SIGNIFICANT CHANGE UP (ref 135–145)
SODIUM SERPL-SCNC: 142 MMOL/L — SIGNIFICANT CHANGE UP (ref 135–145)
TROPONIN T, HIGH SENSITIVITY RESULT: <6 NG/L — SIGNIFICANT CHANGE UP (ref 0–51)
WBC # BLD: 19.31 K/UL — HIGH (ref 3.8–10.5)
WBC # BLD: 26.14 K/UL — HIGH (ref 3.8–10.5)
WBC # FLD AUTO: 19.31 K/UL — HIGH (ref 3.8–10.5)
WBC # FLD AUTO: 26.14 K/UL — HIGH (ref 3.8–10.5)

## 2019-10-17 PROCEDURE — 88307 TISSUE EXAM BY PATHOLOGIST: CPT | Mod: 26

## 2019-10-17 PROCEDURE — 64999 UNLISTED PX NERVOUS SYSTEM: CPT | Mod: 62

## 2019-10-17 PROCEDURE — 93010 ELECTROCARDIOGRAM REPORT: CPT

## 2019-10-17 PROCEDURE — 99291 CRITICAL CARE FIRST HOUR: CPT

## 2019-10-17 PROCEDURE — 64999 UNLISTED PX NERVOUS SYSTEM: CPT

## 2019-10-17 PROCEDURE — 88342 IMHCHEM/IMCYTCHM 1ST ANTB: CPT | Mod: 26,59

## 2019-10-17 PROCEDURE — 62272 THER SPI PNXR DRG CSF: CPT

## 2019-10-17 PROCEDURE — 61782 SCAN PROC CRANIAL EXTRA: CPT

## 2019-10-17 PROCEDURE — 88360 TUMOR IMMUNOHISTOCHEM/MANUAL: CPT | Mod: 26

## 2019-10-17 PROCEDURE — 15576 PEDICLE E/N/E/L/NTRORAL: CPT | Mod: RT

## 2019-10-17 RX ORDER — SODIUM CHLORIDE 9 MG/ML
1000 INJECTION INTRAMUSCULAR; INTRAVENOUS; SUBCUTANEOUS
Refills: 0 | Status: DISCONTINUED | OUTPATIENT
Start: 2019-10-17 | End: 2019-10-17

## 2019-10-17 RX ORDER — SERTRALINE 25 MG/1
100 TABLET, FILM COATED ORAL DAILY
Refills: 0 | Status: DISCONTINUED | OUTPATIENT
Start: 2019-10-17 | End: 2019-10-21

## 2019-10-17 RX ORDER — HYDROMORPHONE HYDROCHLORIDE 2 MG/ML
0.5 INJECTION INTRAMUSCULAR; INTRAVENOUS; SUBCUTANEOUS
Refills: 0 | Status: DISCONTINUED | OUTPATIENT
Start: 2019-10-17 | End: 2019-10-17

## 2019-10-17 RX ORDER — ACETAMINOPHEN 500 MG
650 TABLET ORAL EVERY 6 HOURS
Refills: 0 | Status: DISCONTINUED | OUTPATIENT
Start: 2019-10-17 | End: 2019-10-21

## 2019-10-17 RX ORDER — LIDOCAINE HCL 20 MG/ML
0.2 VIAL (ML) INJECTION ONCE
Refills: 0 | Status: DISCONTINUED | OUTPATIENT
Start: 2019-10-17 | End: 2019-10-17

## 2019-10-17 RX ORDER — PANTOPRAZOLE SODIUM 20 MG/1
40 TABLET, DELAYED RELEASE ORAL
Refills: 0 | Status: DISCONTINUED | OUTPATIENT
Start: 2019-10-17 | End: 2019-10-18

## 2019-10-17 RX ORDER — POTASSIUM CHLORIDE 20 MEQ
20 PACKET (EA) ORAL
Refills: 0 | Status: COMPLETED | OUTPATIENT
Start: 2019-10-17 | End: 2019-10-18

## 2019-10-17 RX ORDER — SODIUM CHLORIDE 9 MG/ML
3 INJECTION INTRAMUSCULAR; INTRAVENOUS; SUBCUTANEOUS EVERY 8 HOURS
Refills: 0 | Status: DISCONTINUED | OUTPATIENT
Start: 2019-10-17 | End: 2019-10-17

## 2019-10-17 RX ORDER — CEFAZOLIN SODIUM 1 G
2000 VIAL (EA) INJECTION EVERY 8 HOURS
Refills: 0 | Status: COMPLETED | OUTPATIENT
Start: 2019-10-17 | End: 2019-10-18

## 2019-10-17 RX ORDER — ALPRAZOLAM 0.25 MG
0.5 TABLET ORAL
Refills: 0 | Status: DISCONTINUED | OUTPATIENT
Start: 2019-10-17 | End: 2019-10-21

## 2019-10-17 RX ORDER — CHLORHEXIDINE GLUCONATE 213 G/1000ML
1 SOLUTION TOPICAL
Refills: 0 | Status: DISCONTINUED | OUTPATIENT
Start: 2019-10-17 | End: 2019-10-21

## 2019-10-17 RX ORDER — ONDANSETRON 8 MG/1
4 TABLET, FILM COATED ORAL
Refills: 0 | Status: DISCONTINUED | OUTPATIENT
Start: 2019-10-17 | End: 2019-10-21

## 2019-10-17 RX ADMIN — Medication 100 MILLIGRAM(S): at 15:08

## 2019-10-17 RX ADMIN — HYDROMORPHONE HYDROCHLORIDE 0.5 MILLIGRAM(S): 2 INJECTION INTRAMUSCULAR; INTRAVENOUS; SUBCUTANEOUS at 20:42

## 2019-10-17 RX ADMIN — HYDROMORPHONE HYDROCHLORIDE 0.5 MILLIGRAM(S): 2 INJECTION INTRAMUSCULAR; INTRAVENOUS; SUBCUTANEOUS at 20:57

## 2019-10-17 RX ADMIN — HYDROMORPHONE HYDROCHLORIDE 0.5 MILLIGRAM(S): 2 INJECTION INTRAMUSCULAR; INTRAVENOUS; SUBCUTANEOUS at 13:10

## 2019-10-17 RX ADMIN — CHLORHEXIDINE GLUCONATE 1 APPLICATION(S): 213 SOLUTION TOPICAL at 23:57

## 2019-10-17 RX ADMIN — Medication 100 MILLIGRAM(S): at 21:31

## 2019-10-17 RX ADMIN — Medication 0.5 MILLIGRAM(S): at 17:03

## 2019-10-17 RX ADMIN — HYDROMORPHONE HYDROCHLORIDE 0.5 MILLIGRAM(S): 2 INJECTION INTRAMUSCULAR; INTRAVENOUS; SUBCUTANEOUS at 13:40

## 2019-10-17 NOTE — CHART NOTE - NSCHARTNOTEFT_GEN_A_CORE
CAPRINI SCORE [CLOT] Score on Admission for     AGE RELATED RISK FACTORS                                                       MOBILITY RELATED FACTORS  [ x] Age 41-60 years                                            (1 Point)                  [ ] Bed rest                                                        (1 Point)  [ ] Age: 61-74 years                                           (2 Points)                 [ ] Plaster cast                                                   (2 Points)  [ ] Age= 75 years                                              (3 Points)                 [ ] Bed bound for more than 72 hours                 (2 Points)    DISEASE RELATED RISK FACTORS                                               GENDER SPECIFIC FACTORS  [ ] Edema in the lower extremities                       (1 Point)                  [ ] Pregnancy                                                     (1 Point)  [ ] Varicose veins                                               (1 Point)                  [ ] Post-partum < 6 weeks                                   (1 Point)             [ ] BMI > 25 Kg/m2                                            (1 Point)                  [ ] Hormonal therapy  or oral contraception          (1 Point)                 [ ] Sepsis (in the previous month)                        (1 Point)                  [ ] History of pregnancy complications                 (1 point)  [ ] Pneumonia or serious lung disease                                               [ ] Unexplained or recurrent                     (1 Point)           (in the previous month)                               (1 Point)  [ ] Abnormal pulmonary function test                     (1 Point)                 SURGERY RELATED RISK FACTORS (include planned surgeries)  [ ] Acute myocardial infarction                              (1 Point)                 [ ]  Section                                             (1 Point)  [ ] Congestive heart failure (in the previous month)  (1 Point)         [ ] Minor surgery                                                  (1 Point)   [ ] Inflammatory bowel disease                             (1 Point)                 [ ] Arthroscopic surgery                                        (2 Points)  [ ] Central venous access                                      (2 Points)                [ ] General surgery lasting more than 45 minutes   (2 Points)       [ ] Stroke (in the previous month)                          (5 Points)               [ ] Elective arthroplasty                                         (5 Points)            [ ] current or past malignancy                              (2 Points)                                                                                                       HEMATOLOGY RELATED FACTORS                                                 TRAUMA RELATED RISK FACTORS  [ ] Prior episodes of VTE                                     (3 Points)                [ ] Fracture of the hip, pelvis, or leg                       (5 Points)  [ ] Positive family history for VTE                         (3 Points)                 [ ] Acute spinal cord injury (in the previous month)  (5 Points)  [ ] Prothrombin 05646 A                                     (3 Points)                 [ ] Paralysis  (less than 1 month)                             (5 Points)  [ ] Factor V Leiden                                             (3 Points)                  [ ] Multiple Trauma within 1 month                        (5 Points)  [ ] Lupus anticoagulants                                     (3 Points)                                                           [ ] Anticardiolipin antibodies                               (3 Points)                                                       [ ] High homocysteine in the blood                      (3 Points)                                             [ ] Other congenital or acquired thrombophilia      (3 Points)                                                [ ] Heparin induced thrombocytopenia                  (3 Points)                                          Total Score [    1     ]    Risk:  Very low 0   Low 1 to 2   Moderate 3 to 4   High =5       VTE Prophylasix Recommednations:  [ x] mechanical pneumatic compression devices                                      [ ] contraindicated: _____________________  [ ] chemo prophylasix                                                                                   [ ] contraindicated _____________________    **** HIGH LIKELIHOOD DVT PRESENT ON ADMISSION  [ ] (please order LE dopplers within 24 hours of admission)

## 2019-10-17 NOTE — PROGRESS NOTE ADULT - SUBJECTIVE AND OBJECTIVE BOX
ENT ISSUE/POD: POD #0 for TRS meningioma w septal flap.     HPI: 44y POD #0 for TRS meningioma w septal flap, pt doing well, no complaints. PT sneezed earlier but denies any salty or metallic taste, no PND, no nasal d/c, no h/a or pain.     PAST MEDICAL & SURGICAL HISTORY:  Marijuana use, continuous  Meningioma  History of IBS  Chronic headaches: last 3 yrs  Bulging lumbar disc: s/p SARITA  Anxiety  S/P arthroscopic knee surgery: right knee 1993    Allergies    No Known Allergies    Intolerances    Cipro (Stomach Upset)  erythromycin (Stomach Upset)    MEDICATIONS  (STANDING):  ALPRAZolam 0.5 milliGRAM(s) Oral two times a day  ceFAZolin   IVPB 2000 milliGRAM(s) IV Intermittent every 8 hours  ceFAZolin   IVPB 2000 milliGRAM(s) IV Intermittent once  clidinium/chlordiazePOXIDE 1 Capsule(s) Oral <User Schedule>  pantoprazole    Tablet 40 milliGRAM(s) Oral before breakfast  sertraline 100 milliGRAM(s) Oral daily  sodium chloride 0.9%. 1000 milliLiter(s) (75 mL/Hr) IV Continuous <Continuous>    MEDICATIONS  (PRN):  acetaminophen   Tablet .. 650 milliGRAM(s) Oral every 6 hours PRN Temp greater or equal to 38C (100.4F), Mild Pain (1 - 3)  HYDROmorphone  Injectable 0.5 milliGRAM(s) IV Push every 10 minutes PRN Moderate Pain (4 - 6)  ondansetron Injectable 4 milliGRAM(s) IV Push every 30 minutes PRN Nausea and/or Vomiting      social history: see consult     family history: see consult     ROS:   ENT: all negative except as noted in HPI   Pulm: denies SOB, cough, hemoptysis  Neuro: denies numbness/tingling, loss of sensation  Endo: denies heat/cold intolerance, excessive sweating      Vital Signs Last 24 Hrs  T(C): 36.6 (17 Oct 2019 15:10), Max: 36.6 (17 Oct 2019 15:10)  T(F): 97.9 (17 Oct 2019 15:10), Max: 97.9 (17 Oct 2019 15:10)  HR: 114 (17 Oct 2019 17:00) (58 - 121)  BP: 105/73 (17 Oct 2019 06:21) (105/73 - 105/73)  BP(mean): --  RR: 19 (17 Oct 2019 17:00) (12 - 19)  SpO2: 96% (17 Oct 2019 17:00) (94% - 100%)                          15.2   19.31 )-----------( 192      ( 17 Oct 2019 17:16 )             45.3    10-17    140  |  101  |  10  ----------------------------<  161<H>  3.2<L>   |  21<L>  |  0.80    Ca    8.6      17 Oct 2019 17:16         PHYSICAL EXAM:  Gen: NAD  Skin: No rashes, bruises, or lesions  Head: Normocephalic, Atraumatic  Face: no edema, erythema, or fluctuance. Parotid glands soft without mass  Eyes: no scleral injection  Nose: minimal oozing, mustache dressing in place   Mouth: No Stridor / Drooling / Trismus.  Mucosa moist, tongue/uvula midline, oropharynx clear  Neck: Flat, supple, no lymphadenopathy, trachea midline, no masses  Lymphatic: No lymphadenopathy  Resp: breathing easily, no stridor  Neuro: facial nerve intact, no facial droop

## 2019-10-17 NOTE — PROCEDURE NOTE - NSURITECHNIQUE_GU_A_CORE
The catheter was secured with a securement device (e.g. StatLock)/The collection bag is below the level of the patient and urinary bladder/Proper hand hygiene was performed/Sterile gloves were worn for the duration of the procedure/The catheter was appropriately lubricated/A sterile drape was used to cover all adjacent areas/The site was cleaned with soap/water and sterile solution (betadine)/All applicable medical record documentation is completed

## 2019-10-17 NOTE — PROCEDURE NOTE - ADDITIONAL PROCEDURE DETAILS
Called by OR staff for difficult pinedo placement. Patient with hypospadias and small meatal opening. OR RN attempted 16F and 12F and unable to advance. Patient prepped and draped with sterile technique. Opening dilated with 8F and 10F pinedo. 12F silicone pinedo easily inserted down to the hub with return of clear yellow urine. Balloon inflated with 10cc and pinedo secured to the leg. Pinedo management per primary team

## 2019-10-17 NOTE — PROGRESS NOTE ADULT - SUBJECTIVE AND OBJECTIVE BOX
SUMMARY: 43 y/o male w/ complaints of headaches x 3 years, lightheadedness, insomnia and anxiety. He was sent for a brain MRI by his ophthalmologist after noting his left pupil was larger than his right. MRI found a 9mm enhancing mass. He smokes daily marijuana which he claims helps his headaches. Presented  for transphenoidal removal of a meningioma. (07 Oct 2019 16:14)    ADMISSION SCORES:  GCS: 15    *** HIGH RISK OF DVT PRESENT ON ADMISSION ***    VITALS:  T(C): , Max: 36.6 (10-17-19 @ 15:10)  HR:  (58 - 121)  BP:  (105/73 - 105/73)  ABP:  (123/64 - 164/82)  RR:  (12 - 19)  SpO2:  (94% - 100%)  Wt(kg): --      10-17-19 @ 07:01  -  10-17-19 @ 18:17  --------------------------------------------------------  IN: 300 mL / OUT: 280 mL / NET: 20 mL      LABS:  Na: 140 (10-17 @ 17:16)  K: 3.2 (10-17 @ 17:16)  Cl: 101 (10-17 @ 17:16)  CO2: 21 (10-17 @ 17:16)  BUN: 10 (10-17 @ 17:16)  Cr: 0.80 (10-17 @ 17:16)  Glu: 161(10-17 @ 17:16)    Hgb: 15.2 (10-17 @ 17:16)  Hct: 45.3 (10-17 @ 17:16)  WBC: 19.31 (10-17 @ 17:16)  Plt: 192 (10-17 @ 17:16)      IMAGING:   Recent imaging studies were reviewed.    MEDICATIONS:  acetaminophen   Tablet .. 650 milliGRAM(s) Oral every 6 hours PRN  ALPRAZolam 0.5 milliGRAM(s) Oral two times a day  ceFAZolin   IVPB 2000 milliGRAM(s) IV Intermittent every 8 hours  ceFAZolin   IVPB 2000 milliGRAM(s) IV Intermittent once  clidinium/chlordiazePOXIDE 1 Capsule(s) Oral <User Schedule>  HYDROmorphone  Injectable 0.5 milliGRAM(s) IV Push every 10 minutes PRN  ondansetron Injectable 4 milliGRAM(s) IV Push every 30 minutes PRN  pantoprazole    Tablet 40 milliGRAM(s) Oral before breakfast  sertraline 100 milliGRAM(s) Oral daily  sodium chloride 0.9%. 1000 milliLiter(s) IV Continuous <Continuous>    EXAMINATION:  General:  calm  HEENT:  MMM  Neuro:   Cards:  RRR  Respiratory:  no respiratory distress  Abdomen:  soft  Extremities:  no edema  Skin:  warm/dry SUMMARY: 43 y/o male w/ complaints of headaches x 3 years, lightheadedness, insomnia and anxiety. He was sent for a brain MRI by his ophthalmologist after noting his left pupil was larger than his right. MRI found a 9mm enhancing mass. He smokes daily marijuana which he claims helps his headaches.   s/p transphenoidal removal of a meningioma     ADMISSION SCORES:  GCS: 15    VITALS:  T(C): , Max: 36.6 (10-17-19 @ 15:10)  HR:  (58 - 121)  BP:  (105/73 - 105/73)  ABP:  (123/64 - 164/82)  RR:  (12 - 19)  SpO2:  (94% - 100%)  Wt(kg): --      10-17-19 @ 07:01  -  10-17-19 @ 18:17  --------------------------------------------------------  IN: 300 mL / OUT: 280 mL / NET: 20 mL      LABS:  Na: 140 (10-17 @ 17:16)  K: 3.2 (10-17 @ 17:16)  Cl: 101 (10-17 @ 17:16)  CO2: 21 (10-17 @ 17:16)  BUN: 10 (10-17 @ 17:16)  Cr: 0.80 (10-17 @ 17:16)  Glu: 161(10-17 @ 17:16)    Hgb: 15.2 (10-17 @ 17:16)  Hct: 45.3 (10-17 @ 17:16)  WBC: 19.31 (10-17 @ 17:16)  Plt: 192 (10-17 @ 17:16)      IMAGING:   Recent imaging studies were reviewed.    MEDICATIONS:  acetaminophen   Tablet .. 650 milliGRAM(s) Oral every 6 hours PRN  ALPRAZolam 0.5 milliGRAM(s) Oral two times a day  ceFAZolin   IVPB 2000 milliGRAM(s) IV Intermittent every 8 hours  ceFAZolin   IVPB 2000 milliGRAM(s) IV Intermittent once  clidinium/chlordiazePOXIDE 1 Capsule(s) Oral <User Schedule>  HYDROmorphone  Injectable 0.5 milliGRAM(s) IV Push every 10 minutes PRN  ondansetron Injectable 4 milliGRAM(s) IV Push every 30 minutes PRN  pantoprazole    Tablet 40 milliGRAM(s) Oral before breakfast  sertraline 100 milliGRAM(s) Oral daily  sodium chloride 0.9%. 1000 milliLiter(s) IV Continuous <Continuous>    EXAMINATION:  General:  calm  HEENT:  MMM  Neuro: alert and oriented x 3, EOMI, facial sensation intact, pupils equal and reactive, his visual fields are full, strength is 5/5 throughout   Cards:  RRR  Respiratory:  no respiratory distress  Abdomen:  soft  Extremities:  no edema  Skin:  warm/dry

## 2019-10-17 NOTE — PROGRESS NOTE ADULT - ASSESSMENT
s/p TSP resection of meningioma POD #1  Hx of alcohol/marijuana/benzo abuse    NEURO:  q1h neuro checks  CT tomorrow AM  Pain control w/ Dilaudid PCA  Hx of alcohol abuse: Thiamine/Folate/MTV  CIWA protocol  ?Mood disorder - Cont on ___ 100 daily  OOB/PT/OT    CARDS:  -150  CP - Diaphoretic  EKG - wnl; troponin pending    PULM:  sat > 92%  Incentive spirometry, if able    RENAL:  IVF    GASTRO:  Dysphagia screen and advance as directed  Bowel regimen  ---> Stress ulcer prophylaxis:  Not indicated  Home med - Librax; continue    HEME:  monitor H/H    ---> DVT prophylaxis: SCDs, hold anticoagulation since fresh post-op    ENDO:  euglycemia    ID:  Monitor for fevers    Code status:  Full code  Disposition:  ICU    This patient was at high risk of neurologic deterioration and/or death due to:     Time spent:  45 minutes s/p TSP resection of meningioma POD #1  Hx of alcohol/marijuana/benzo abuse    NEURO:  q1h neuro checks  CT tomorrow AM  Pain control w/ Dilaudid PCA  anxiety c/w xanax po bid  OOB/PT/OT    CARDS:  -150  CP - Diaphoretic  EKG - wnl; troponin pending    PULM:  sat > 92%  Incentive spirometry, if able    RENAL:  IVF  monitor u/o, di watch   pinedo    GASTRO:  Dysphagia screen and advance as directed  Bowel regimen  ---> Stress ulcer prophylaxis:  Not indicated  Home med - Librax; continue    HEME:  monitor H/H    ---> DVT prophylaxis: SCDs, hold anticoagulation since fresh post-op    ENDO:  euglycemia    ID:  Monitor for fevers    Code status:  Full code  Disposition:  ICU    This patient was at high risk of neurologic deterioration and/or death due to:     Time spent:  45 minutes s/p TSP resection of meningioma POD #1  Hx of alcohol/marijuana/benzo abuse    NEURO:  q1h neuro checks  CT tomorrow AM  Pain control w/ Dilaudid PCA  anxiety c/w xanax po bid  LD clamped   OOB/PT/OT    CARDS:  -150  CP - Diaphoretic  EKG - wnl; troponin pending    PULM:  sat > 92%  Incentive spirometry, if able    RENAL:  IVF  monitor u/o, di watch   pinedo    GASTRO:  Dysphagia screen and advance as directed  Bowel regimen  ---> Stress ulcer prophylaxis:  Not indicated  Home med - Librax; continue    HEME:  monitor H/H    ---> DVT prophylaxis: SCDs, hold anticoagulation since fresh post-op    ENDO:  euglycemia    ID:  Monitor for fevers    Code status:  Full code  Disposition:  ICU    This patient was at high risk of neurologic deterioration and/or death due to:     Time spent:  45 minutes s/p TSP resection of meningioma POD #0  Hx of alcohol/marijuana/benzo abuse    NEURO:  q1h neuro checks  CT tomorrow AM  Pain control w/ Dilaudid PCA  anxiety c/w xanax po bid  LD clamped   OOB/PT/OT    CARDS:  -150  CP - Diaphoretic  EKG - wnl; troponin pending    PULM:  sat > 92%  Incentive spirometry, if able    RENAL:  IVF  monitor u/o, di watch   pinedo    GASTRO:  Dysphagia screen and advance as directed  Bowel regimen  ---> Stress ulcer prophylaxis:  Not indicated  Home med - Librax; continue    HEME:  monitor H/H    ---> DVT prophylaxis: SCDs, hold anticoagulation since fresh post-op    ENDO:  euglycemia    ID:  Monitor for fevers    Code status:  Full code  Disposition:  ICU    This patient was at high risk of neurologic deterioration and/or death due to:     Time spent:  45 minutes

## 2019-10-17 NOTE — BRIEF OPERATIVE NOTE - NSICDXBRIEFPROCEDURE_GEN_ALL_CORE_FT
PROCEDURES:  Resection, tumor, pituitary, transsphenoidal or transnasal, endoscopic 17-Oct-2019 12:15:37  Carlos Oliver

## 2019-10-18 DIAGNOSIS — Z71.89 OTHER SPECIFIED COUNSELING: ICD-10-CM

## 2019-10-18 LAB
ACTH SER-ACNC: 20.1 PG/ML — SIGNIFICANT CHANGE UP (ref 7.2–63.3)
CORTIS AM PEAK SERPL-MCNC: 35.9 UG/DL — HIGH (ref 6–18.4)
LH SERPL-ACNC: 3.9 IU/L — SIGNIFICANT CHANGE UP
PROLACTIN SERPL-MCNC: 6.4 NG/ML — SIGNIFICANT CHANGE UP (ref 4.1–18.4)
TROPONIN T, HIGH SENSITIVITY RESULT: <6 NG/L — SIGNIFICANT CHANGE UP (ref 0–51)
TSH SERPL-MCNC: 0.65 UIU/ML — SIGNIFICANT CHANGE UP (ref 0.27–4.2)

## 2019-10-18 PROCEDURE — 99024 POSTOP FOLLOW-UP VISIT: CPT

## 2019-10-18 PROCEDURE — 99233 SBSQ HOSP IP/OBS HIGH 50: CPT

## 2019-10-18 RX ORDER — SODIUM CHLORIDE 0.65 %
1 AEROSOL, SPRAY (ML) NASAL
Refills: 0 | Status: DISCONTINUED | OUTPATIENT
Start: 2019-10-18 | End: 2019-10-18

## 2019-10-18 RX ORDER — SODIUM CHLORIDE 9 MG/ML
1000 INJECTION INTRAMUSCULAR; INTRAVENOUS; SUBCUTANEOUS
Refills: 0 | Status: DISCONTINUED | OUTPATIENT
Start: 2019-10-18 | End: 2019-10-18

## 2019-10-18 RX ORDER — TRAMADOL HYDROCHLORIDE 50 MG/1
50 TABLET ORAL EVERY 6 HOURS
Refills: 0 | Status: DISCONTINUED | OUTPATIENT
Start: 2019-10-18 | End: 2019-10-21

## 2019-10-18 RX ORDER — TRAMADOL HYDROCHLORIDE 50 MG/1
25 TABLET ORAL EVERY 6 HOURS
Refills: 0 | Status: DISCONTINUED | OUTPATIENT
Start: 2019-10-18 | End: 2019-10-21

## 2019-10-18 RX ORDER — ZOLPIDEM TARTRATE 10 MG/1
5 TABLET ORAL AT BEDTIME
Refills: 0 | Status: DISCONTINUED | OUTPATIENT
Start: 2019-10-18 | End: 2019-10-19

## 2019-10-18 RX ORDER — SODIUM CHLORIDE 0.65 %
1 AEROSOL, SPRAY (ML) NASAL
Refills: 0 | Status: DISCONTINUED | OUTPATIENT
Start: 2019-10-18 | End: 2019-10-21

## 2019-10-18 RX ADMIN — ONDANSETRON 4 MILLIGRAM(S): 8 TABLET, FILM COATED ORAL at 05:20

## 2019-10-18 RX ADMIN — Medication 0.5 MILLIGRAM(S): at 05:08

## 2019-10-18 RX ADMIN — Medication 20 MILLIEQUIVALENT(S): at 01:10

## 2019-10-18 RX ADMIN — TRAMADOL HYDROCHLORIDE 50 MILLIGRAM(S): 50 TABLET ORAL at 01:06

## 2019-10-18 RX ADMIN — SERTRALINE 100 MILLIGRAM(S): 25 TABLET, FILM COATED ORAL at 11:18

## 2019-10-18 RX ADMIN — Medication 0.5 MILLIGRAM(S): at 17:59

## 2019-10-18 RX ADMIN — TRAMADOL HYDROCHLORIDE 50 MILLIGRAM(S): 50 TABLET ORAL at 06:39

## 2019-10-18 RX ADMIN — PANTOPRAZOLE SODIUM 40 MILLIGRAM(S): 20 TABLET, DELAYED RELEASE ORAL at 06:39

## 2019-10-18 RX ADMIN — Medication 1 SPRAY(S): at 18:04

## 2019-10-18 RX ADMIN — Medication 100 MILLIGRAM(S): at 05:08

## 2019-10-18 RX ADMIN — TRAMADOL HYDROCHLORIDE 50 MILLIGRAM(S): 50 TABLET ORAL at 23:55

## 2019-10-18 RX ADMIN — TRAMADOL HYDROCHLORIDE 50 MILLIGRAM(S): 50 TABLET ORAL at 14:30

## 2019-10-18 RX ADMIN — ZOLPIDEM TARTRATE 5 MILLIGRAM(S): 10 TABLET ORAL at 23:55

## 2019-10-18 RX ADMIN — CHLORHEXIDINE GLUCONATE 1 APPLICATION(S): 213 SOLUTION TOPICAL at 21:39

## 2019-10-18 RX ADMIN — SODIUM CHLORIDE 75 MILLILITER(S): 9 INJECTION INTRAMUSCULAR; INTRAVENOUS; SUBCUTANEOUS at 05:08

## 2019-10-18 RX ADMIN — TRAMADOL HYDROCHLORIDE 50 MILLIGRAM(S): 50 TABLET ORAL at 00:36

## 2019-10-18 RX ADMIN — Medication 1 SPRAY(S): at 23:56

## 2019-10-18 RX ADMIN — Medication 20 MILLIEQUIVALENT(S): at 00:03

## 2019-10-18 RX ADMIN — TRAMADOL HYDROCHLORIDE 50 MILLIGRAM(S): 50 TABLET ORAL at 15:30

## 2019-10-18 NOTE — PHYSICAL THERAPY INITIAL EVALUATION ADULT - ASR WT BEARING STATUS EVAL
CTH: The exam findings are most consistent with a meningioma located in the region of the posterior aspect of the planum sphenoidale-left paraclinoid region as described./no weight-bearing restrictions

## 2019-10-18 NOTE — PHYSICAL THERAPY INITIAL EVALUATION ADULT - PERTINENT HX OF CURRENT PROBLEM, REHAB EVAL
Pt is 44M w/ complaints of headaches x 3 years, lightheadedness, insomnia and anxiety. He was sent for a brain MRI by his ophthalmologist after noting his left pupil was larger than his right. MRI found a 9mm enhancing mass. He smokes daily marijuana which he claims helps his headaches. Presents now for transphenoidal removal of a meningioma.

## 2019-10-18 NOTE — PROGRESS NOTE ADULT - ASSESSMENT
44M s/p TSP  - cont monitor for leak, keep LD clamped  - CTH in AM  - DI watch  - f/u endocrine labs

## 2019-10-18 NOTE — PROGRESS NOTE ADULT - ASSESSMENT
s/p TSP resection of meningioma POD #1  Hx of alcohol/marijuana/benzo abuse    NEURO:  q1h neuro checks  MRI Brain per NSGY  Pain control w/ Tramadol 25/50 prn  anxiety c/w xanax po bid  Contd on home med of Sertraline 100 daily  LD clamped   OOB/PT/OT    CARDS:  -150  CP 10/17 - EKG - wnl; troponin negative x 2    PULM:  sat > 92%  Incentive spirometry, if able    RENAL:  IVF; IVL once adequate po intake  monitor u/o, di watch   pinedo    GASTRO: Liquid diet  Bowel regimen  ---> Stress ulcer prophylaxis:  Not indicated  Home med - Librax; continue    HEME:  monitor H/H    ---> DVT prophylaxis: SCDs, hold anticoagulation till MRI    ENDO:  euglycemia    ID:  Monitor for fevers    Code status:  Full code  Disposition:  ICU    This patient was at high risk of neurologic deterioration and/or death due to: brain hemorrhage, DI    Time spent:  45 minutes s/p TSP resection of meningioma POD #1  Hx of alcohol/marijuana/benzo abuse    NEURO:  q2h neuro checks  MRI Brain per NSGY  Pain control w/ Tramadol 25/50 prn  anxiety c/w xanax po bid  Contd on home med of Sertraline 100 daily  LD clamped until Sunday, per NSGY  OOB/PT/OT    CARDS:  -150  CP 10/17 - EKG - wnl; troponin negative x 2    PULM:  sat > 92%  Incentive spirometry, if able    RENAL:  IVF; IVL once adequate po intake  monitor u/o, di watch   pinedo    GASTRO: Liquid diet  Bowel regimen  ---> Stress ulcer prophylaxis:  Not indicated  Home med - Librax; continue    HEME:  monitor H/H    ---> DVT prophylaxis: SCDs, hold anticoagulation till MRI    ENDO:  euglycemia    ID:  Monitor for fevers    Code status:  Full code  Disposition:  ICU    This patient was at high risk of neurologic deterioration and/or death due to: brain hemorrhage, DI    Time spent:  45 minutes s/p TSP resection of meningioma POD #1  Hx of alcohol/marijuana/benzo abuse    NEURO:  q2h neuro checks  MRI Brain per NSGY  Pain control w/ Tramadol 25/50 prn  anxiety c/w xanax po bid  Contd on home med of Sertraline 100 daily  LD clamped until Sunday, per NSGY  OOB/PT/OT    CARDS:  -150  CP 10/17 - EKG - wnl; troponin negative x 2    PULM:  sat > 92%  Incentive spirometry, if able    RENAL:  IVF; IVL once adequate po intake  monitor u/o, di watch   pinedo    GASTRO: Liquid diet  Bowel regimen  ---> Stress ulcer prophylaxis:  Not indicated  Home med - Librax; continue    HEME:  monitor H/H    ---> DVT prophylaxis: SCDs, hold anticoagulation till MRI    ENDO:  euglycemia    ID:  Monitor for fevers    Code status:  Full code  Disposition:  ICU

## 2019-10-18 NOTE — PHYSICAL THERAPY INITIAL EVALUATION ADULT - PLANNED THERAPY INTERVENTIONS, PT EVAL
GOALS: Pt will be able to neg 1 flight of stairs, independently, +HR, in 4 weeks./gait training/bed mobility training/transfer training

## 2019-10-18 NOTE — PROGRESS NOTE ADULT - SUBJECTIVE AND OBJECTIVE BOX
SUMMARY: 43 y/o male w/ complaints of headaches x 3 years, lightheadedness, insomnia and anxiety. He was sent for a brain MRI by his ophthalmologist after noting his left pupil was larger than his right. MRI found a 9mm enhancing mass. He smokes daily marijuana which he claims helps his headaches.   s/p transphenoidal removal of a meningioma (10/17)    ADMISSION SCORES:  GCS: 15    Overnight Events: Neurologically stable.     ROS: Negative unless specified    VITALS:   T(C): 36.6 (10-18-19 @ 05:00), Max: 37 (10-17-19 @ 19:00)  HR: 104 (10-18-19 @ 06:00) (89 - 121)  BP: --  RR: 16 (10-18-19 @ 06:00) (12 - 22)  SpO2: 96% (10-18-19 @ 06:00) (93% - 100%)    10-17-19 @ 07:01  -  10-18-19 @ 07:00  --------------------------------------------------------  IN: 1585 mL / OUT: 1580 mL / NET: 5 mL      LABS:                          15.0   26.14 )-----------( 193      ( 17 Oct 2019 22:03 )             44.9     10-17    142  |  104  |  10  ----------------------------<  158<H>  3.5   |  22  |  0.80    Ca    8.5      17 Oct 2019 22:03  Phos  4.9     10-17  Mg     2.2     10-17        MEDS:  MEDICATIONS  (STANDING):  ALPRAZolam 0.5 milliGRAM(s) Oral two times a day  ceFAZolin   IVPB 2000 milliGRAM(s) IV Intermittent once  chlorhexidine 4% Liquid 1 Application(s) Topical <User Schedule>  clidinium/chlordiazePOXIDE 1 Capsule(s) Oral <User Schedule>  pantoprazole    Tablet 40 milliGRAM(s) Oral before breakfast  sertraline 100 milliGRAM(s) Oral daily  sodium chloride 0.9%. 1000 milliLiter(s) (75 mL/Hr) IV Continuous <Continuous>    EXAMINATION:  General:  calm  HEENT:  MMM  Neuro: alert and oriented x 3, EOMI, facial sensation intact, pupils equal and reactive, his visual fields are full, strength is 5/5 throughout   Cards:  RRR  Respiratory:  no respiratory distress  Abdomen:  soft  Extremities:  no edema  Skin:  warm/dry

## 2019-10-18 NOTE — PROGRESS NOTE ADULT - SUBJECTIVE AND OBJECTIVE BOX
incomplete s/p transphenoidal removal of a meningioma   LD remains clamped, no csf leak    alert and oriented x 3, EOMI, facial sensation intact, pupils 2mm equal and reactive, his visual fields are full, strength is 5/5 throughout

## 2019-10-18 NOTE — PHYSICAL THERAPY INITIAL EVALUATION ADULT - ADDITIONAL COMMENTS
Pt lives in private home with mother, sibling, and siblings family, with 2 steps to enter home, 1 flight of stairs inside, +HR. PLOF: independent for all transfers, mobility, and amb without AD.

## 2019-10-18 NOTE — PROGRESS NOTE ADULT - ASSESSMENT
s/p TSP resection of meningioma POD #1  Hx of alcohol/marijuana/benzo abuse    - LD clamped, keep until sunday   -monitor for csf leak  -xanax for anxiety   -mri after LD removed  - tolerating full liquid s/p TSP resection of meningioma POD #1  Hx of alcohol/marijuana/benzo abuse    - LD clamped, keep until sunday   -monitor for csf leak  -xanax for anxiety   -mri after LD removed  - tolerating full liquid  -pinedo per urology  - u/o wnl

## 2019-10-18 NOTE — PROGRESS NOTE ADULT - SUBJECTIVE AND OBJECTIVE BOX
ENT ISSUE/POD: POD #1 for TSR meningioma w septal flap    HPI: 44y POD #1 for TRS meningioma w septal flap, pt doing well, no complaints. PT denies any salty or metallic taste, epistaxis, no PND, no clear nasal d/c, no h/a or pain.             PAST MEDICAL & SURGICAL HISTORY:  Marijuana use, continuous  Meningioma  History of IBS  Chronic headaches: last 3 yrs  Bulging lumbar disc: s/p SARITA  Anxiety  S/P arthroscopic knee surgery: right knee 1993    Allergies    No Known Allergies    Intolerances    Cipro (Stomach Upset)  erythromycin (Stomach Upset)    MEDICATIONS  (STANDING):  ALPRAZolam 0.5 milliGRAM(s) Oral two times a day  ceFAZolin   IVPB 2000 milliGRAM(s) IV Intermittent once  chlorhexidine 4% Liquid 1 Application(s) Topical <User Schedule>  clidinium/chlordiazePOXIDE 1 Capsule(s) Oral <User Schedule>  pantoprazole    Tablet 40 milliGRAM(s) Oral before breakfast  sertraline 100 milliGRAM(s) Oral daily  sodium chloride 0.9%. 1000 milliLiter(s) (75 mL/Hr) IV Continuous <Continuous>    MEDICATIONS  (PRN):  acetaminophen   Tablet .. 650 milliGRAM(s) Oral every 6 hours PRN Temp greater or equal to 38C (100.4F), Mild Pain (1 - 3)  ondansetron Injectable 4 milliGRAM(s) IV Push every 30 minutes PRN Nausea and/or Vomiting  traMADol 25 milliGRAM(s) Oral every 6 hours PRN Moderate Pain (4 - 6)  traMADol 50 milliGRAM(s) Oral every 6 hours PRN Severe Pain (7 - 10)      Social History: see consult    Family history: see consult    ROS:   ENT: all negative except as noted in HPI   Pulm: denies SOB, cough, hemoptysis  Neuro: denies numbness/tingling, loss of sensation  Endo: denies heat/cold intolerance, excessive sweating      Vital Signs Last 24 Hrs  T(C): 36.6 (18 Oct 2019 05:00), Max: 37 (17 Oct 2019 19:00)  T(F): 97.8 (18 Oct 2019 05:00), Max: 98.6 (17 Oct 2019 19:00)  HR: 104 (18 Oct 2019 06:00) (89 - 121)  BP: --  BP(mean): --  RR: 16 (18 Oct 2019 06:00) (12 - 22)  SpO2: 96% (18 Oct 2019 06:00) (93% - 100%)                          15.0   26.14 )-----------( 193      ( 17 Oct 2019 22:03 )             44.9    10-17    142  |  104  |  10  ----------------------------<  158<H>  3.5   |  22  |  0.80    Ca    8.5      17 Oct 2019 22:03  Phos  4.9     10-17  Mg     2.2     10-17         PHYSICAL EXAM:  Gen: NAD  Skin: No rashes, bruises, or lesions  Head: Normocephalic, Atraumatic  Face: no edema, erythema, or fluctuance. Parotid glands soft without mass  Eyes: no scleral injection  Nose: minimal oozing, mustache dressing in place   Mouth: No Stridor / Drooling / Trismus.  Mucosa moist, tongue/uvula midline, oropharynx clear  Neck: Flat, supple, no lymphadenopathy, trachea midline, no masses  Lymphatic: No lymphadenopathy  Resp: breathing easily, no stridor  Neuro: facial nerve intact, no facial droop

## 2019-10-18 NOTE — PHYSICAL THERAPY INITIAL EVALUATION ADULT - GENERAL OBSERVATIONS, REHAB EVAL
Pt A&Ox4, received semisupine in bed, alert, willing to participate in session, +IV +Morgan, +tele, +lumbar drain (clamped).

## 2019-10-18 NOTE — ANESTHESIA FOLLOW-UP NOTE - NSEVALATION_GEN_ALL_CORE
No apparent complications or complaints regarding anesthesia care at this time
Authored by Resident/PA/NP

## 2019-10-18 NOTE — PROGRESS NOTE ADULT - SUBJECTIVE AND OBJECTIVE BOX
Patient seen and examined at bedside.    --Anticoagulation--    T(C): 36.5 (10-17-19 @ 23:00), Max: 37 (10-17-19 @ 19:00)  HR: 107 (10-18-19 @ 02:00) (58 - 121)  BP: 105/73 (10-17-19 @ 06:21) (105/73 - 105/73)  RR: 18 (10-18-19 @ 02:00) (12 - 22)  SpO2: 94% (10-18-19 @ 02:00) (93% - 100%)  Wt(kg): --    Exam:  AOx3, FC, EOMI, VFFtC  5/5 throughout, no drift  No leak

## 2019-10-18 NOTE — PHYSICAL THERAPY INITIAL EVALUATION ADULT - PRECAUTIONS/LIMITATIONS, REHAB EVAL
CTH: The exam findings are most consistent with a meningioma located in the region of the posterior aspect of the planum sphenoidale-left paraclinoid region as described./fall precautions/vision precautions Nasal precautions/vision precautions/fall precautions

## 2019-10-19 LAB
ANION GAP SERPL CALC-SCNC: 11 MMOL/L — SIGNIFICANT CHANGE UP (ref 5–17)
ANION GAP SERPL CALC-SCNC: 12 MMOL/L — SIGNIFICANT CHANGE UP (ref 5–17)
BUN SERPL-MCNC: 10 MG/DL — SIGNIFICANT CHANGE UP (ref 7–23)
BUN SERPL-MCNC: 10 MG/DL — SIGNIFICANT CHANGE UP (ref 7–23)
CALCIUM SERPL-MCNC: 8.6 MG/DL — SIGNIFICANT CHANGE UP (ref 8.4–10.5)
CALCIUM SERPL-MCNC: 8.6 MG/DL — SIGNIFICANT CHANGE UP (ref 8.4–10.5)
CHLORIDE SERPL-SCNC: 108 MMOL/L — SIGNIFICANT CHANGE UP (ref 96–108)
CHLORIDE SERPL-SCNC: 109 MMOL/L — HIGH (ref 96–108)
CO2 SERPL-SCNC: 25 MMOL/L — SIGNIFICANT CHANGE UP (ref 22–31)
CO2 SERPL-SCNC: 26 MMOL/L — SIGNIFICANT CHANGE UP (ref 22–31)
CORTIS AM PEAK SERPL-MCNC: 19.3 UG/DL — HIGH (ref 6–18.4)
CREAT SERPL-MCNC: 0.76 MG/DL — SIGNIFICANT CHANGE UP (ref 0.5–1.3)
CREAT SERPL-MCNC: 0.77 MG/DL — SIGNIFICANT CHANGE UP (ref 0.5–1.3)
GH SERPL-MCNC: 1.69 NG/ML — SIGNIFICANT CHANGE UP (ref 0.03–2.47)
GLUCOSE SERPL-MCNC: 115 MG/DL — HIGH (ref 70–99)
GLUCOSE SERPL-MCNC: 118 MG/DL — HIGH (ref 70–99)
MAGNESIUM SERPL-MCNC: 2.4 MG/DL — SIGNIFICANT CHANGE UP (ref 1.6–2.6)
MAGNESIUM SERPL-MCNC: 2.6 MG/DL — SIGNIFICANT CHANGE UP (ref 1.6–2.6)
PHOSPHATE SERPL-MCNC: 1.9 MG/DL — LOW (ref 2.5–4.5)
PHOSPHATE SERPL-MCNC: 2 MG/DL — LOW (ref 2.5–4.5)
POTASSIUM SERPL-MCNC: 4 MMOL/L — SIGNIFICANT CHANGE UP (ref 3.5–5.3)
POTASSIUM SERPL-MCNC: 4 MMOL/L — SIGNIFICANT CHANGE UP (ref 3.5–5.3)
POTASSIUM SERPL-SCNC: 4 MMOL/L — SIGNIFICANT CHANGE UP (ref 3.5–5.3)
POTASSIUM SERPL-SCNC: 4 MMOL/L — SIGNIFICANT CHANGE UP (ref 3.5–5.3)
SODIUM SERPL-SCNC: 145 MMOL/L — SIGNIFICANT CHANGE UP (ref 135–145)
SODIUM SERPL-SCNC: 146 MMOL/L — HIGH (ref 135–145)

## 2019-10-19 PROCEDURE — 99233 SBSQ HOSP IP/OBS HIGH 50: CPT

## 2019-10-19 RX ORDER — ZOLPIDEM TARTRATE 10 MG/1
10 TABLET ORAL AT BEDTIME
Refills: 0 | Status: DISCONTINUED | OUTPATIENT
Start: 2019-10-19 | End: 2019-10-21

## 2019-10-19 RX ORDER — ENOXAPARIN SODIUM 100 MG/ML
40 INJECTION SUBCUTANEOUS
Refills: 0 | Status: DISCONTINUED | OUTPATIENT
Start: 2019-10-19 | End: 2019-10-19

## 2019-10-19 RX ORDER — ENOXAPARIN SODIUM 100 MG/ML
40 INJECTION SUBCUTANEOUS
Refills: 0 | Status: DISCONTINUED | OUTPATIENT
Start: 2019-10-19 | End: 2019-10-21

## 2019-10-19 RX ORDER — POTASSIUM PHOSPHATE, MONOBASIC POTASSIUM PHOSPHATE, DIBASIC 236; 224 MG/ML; MG/ML
30 INJECTION, SOLUTION INTRAVENOUS ONCE
Refills: 0 | Status: COMPLETED | OUTPATIENT
Start: 2019-10-19 | End: 2019-10-20

## 2019-10-19 RX ORDER — METHOCARBAMOL 500 MG/1
500 TABLET, FILM COATED ORAL EVERY 12 HOURS
Refills: 0 | Status: DISCONTINUED | OUTPATIENT
Start: 2019-10-19 | End: 2019-10-21

## 2019-10-19 RX ORDER — POTASSIUM PHOSPHATE, MONOBASIC POTASSIUM PHOSPHATE, DIBASIC 236; 224 MG/ML; MG/ML
15 INJECTION, SOLUTION INTRAVENOUS ONCE
Refills: 0 | Status: COMPLETED | OUTPATIENT
Start: 2019-10-19 | End: 2019-10-19

## 2019-10-19 RX ORDER — ZOLPIDEM TARTRATE 10 MG/1
5 TABLET ORAL AT BEDTIME
Refills: 0 | Status: DISCONTINUED | OUTPATIENT
Start: 2019-10-19 | End: 2019-10-19

## 2019-10-19 RX ADMIN — POTASSIUM PHOSPHATE, MONOBASIC POTASSIUM PHOSPHATE, DIBASIC 62.5 MILLIMOLE(S): 236; 224 INJECTION, SOLUTION INTRAVENOUS at 02:51

## 2019-10-19 RX ADMIN — TRAMADOL HYDROCHLORIDE 50 MILLIGRAM(S): 50 TABLET ORAL at 12:15

## 2019-10-19 RX ADMIN — Medication 1 SPRAY(S): at 18:17

## 2019-10-19 RX ADMIN — SERTRALINE 100 MILLIGRAM(S): 25 TABLET, FILM COATED ORAL at 12:53

## 2019-10-19 RX ADMIN — Medication 0.5 MILLIGRAM(S): at 05:11

## 2019-10-19 RX ADMIN — Medication 0.5 MILLIGRAM(S): at 18:17

## 2019-10-19 RX ADMIN — Medication 1 SPRAY(S): at 12:53

## 2019-10-19 RX ADMIN — ENOXAPARIN SODIUM 40 MILLIGRAM(S): 100 INJECTION SUBCUTANEOUS at 18:55

## 2019-10-19 RX ADMIN — Medication 1 SPRAY(S): at 05:25

## 2019-10-19 RX ADMIN — TRAMADOL HYDROCHLORIDE 50 MILLIGRAM(S): 50 TABLET ORAL at 00:25

## 2019-10-19 RX ADMIN — CHLORHEXIDINE GLUCONATE 1 APPLICATION(S): 213 SOLUTION TOPICAL at 21:12

## 2019-10-19 RX ADMIN — TRAMADOL HYDROCHLORIDE 25 MILLIGRAM(S): 50 TABLET ORAL at 23:25

## 2019-10-19 RX ADMIN — ZOLPIDEM TARTRATE 10 MILLIGRAM(S): 10 TABLET ORAL at 22:56

## 2019-10-19 RX ADMIN — TRAMADOL HYDROCHLORIDE 25 MILLIGRAM(S): 50 TABLET ORAL at 22:55

## 2019-10-19 RX ADMIN — ZOLPIDEM TARTRATE 5 MILLIGRAM(S): 10 TABLET ORAL at 01:28

## 2019-10-19 RX ADMIN — TRAMADOL HYDROCHLORIDE 50 MILLIGRAM(S): 50 TABLET ORAL at 11:45

## 2019-10-19 NOTE — PROGRESS NOTE ADULT - ASSESSMENT
s/p TSP resection of meningioma POD # 2  Hx of alcohol/marijuana/benzo abuse    NEURO:  q2h neuro checks  MRI Brain per NSGY  Pain control w/ Tramadol 25/50 prn  anxiety c/w xanax po bid  Contd on home med of Sertraline 100 daily  LD clamped until Sunday, per NSGY  OOB/PT/OT    CARDS:  -150  CP 10/17 - EKG - wnl; troponin negative x 2    PULM:  sat > 92%  Incentive spirometry, if able    RENAL:  IVF; IVL once adequate po intake  monitor u/o, di watch   pinedo    GASTRO: Liquid diet  Bowel regimen  ---> Stress ulcer prophylaxis:  Not indicated  Home med - Librax; continue    HEME:  monitor H/H    ---> DVT prophylaxis: SCDs, hold anticoagulation till MRI    ENDO:  euglycemia    ID:  Monitor for fevers    Code status:  Full code  Disposition:  ICU s/p TSP resection of meningioma POD # 2  Hx of alcohol/marijuana/benzo abuse    NEURO:  q4h neuro checks  Post-op MRI Brain pending  Pain control w/ Tramadol 25/50 prn  anxiety c/w xanax po bid  C/w home dose Ambien  Contd on home med of Sertraline 100 daily  LD clamped until Sunday, per NSGY  OOB/PT/OT    CARDS:  -150  CP 10/17 - EKG - wnl; troponin negative x 2    PULM:  sat > 92%  Incentive spirometry, if able    RENAL:  IVL  monitor u/o, di watch   pinedo    GASTRO: Liquid diet  Bowel regimen  ---> Stress ulcer prophylaxis:  Not indicated  Home med - Librax; continue    HEME:  monitor H/H    ---> DVT prophylaxis: SCDs, hold anticoagulation till MRI    ENDO:  euglycemia    ID:  Monitor for fevers    Code status:  Full code  Disposition:  ICU s/p TSP resection of meningioma POD # 2  Hx of alcohol/marijuana/benzo abuse    NEURO:  q4h neuro checks  Post-op MRI Brain pending  Pain control w/ Tramadol 25/50 prn  anxiety c/w xanax po bid  C/w home dose Ambien  Contd on home med of Sertraline 100 daily  LD clamped until Sunday, per NSGY  OOB/PT/OT    CARDS:  -150  CP 10/17 - EKG - wnl; troponin negative x 2    PULM:  sat > 92%  Incentive spirometry, if able    RENAL:  IVL  monitor u/o, di watch   pinedo    GASTRO: Liquid diet; will add Ensure  Bowel regimen  ---> Stress ulcer prophylaxis:  Not indicated  Home med - Librax; continue    HEME:  monitor H/H    ---> DVT prophylaxis: SCDs, will check w/ Dr. Amado regarding starting prophylactic AC  Patient encouraged to walk.     ENDO:  euglycemia    ID:  Monitor for fevers    Code status:  Full code  Disposition:  ICU s/p TSP resection of meningioma POD # 2  Hx of alcohol/marijuana/benzo abuse    NEURO:  q4h neuro checks  Post-op MRI Brain pending  Pain control w/ Tramadol 25/50 prn  anxiety c/w xanax po bid  C/w home dose Ambien  Contd on home med of Sertraline 100 daily  LD clamped until Sunday, per NSGY  Trial of muscle relaxant to help back spasm  OOB/PT/OT    CARDS:  -150  CP 10/17 - EKG - wnl; troponin negative x 2    PULM:  sat > 92%  Incentive spirometry, if able    RENAL:  IVL  monitor u/o, di watch   pinedo    GASTRO: Liquid diet; will add Ensure  Bowel regimen  ---> Stress ulcer prophylaxis:  Not indicated  Home med - Librax; continue    HEME:  monitor H/H    ---> DVT prophylaxis: SCDs, will check w/ Dr. Amado regarding starting prophylactic AC  Patient encouraged to walk.     ENDO:  euglycemia    ID:  Monitor for fevers    Code status:  Full code  Disposition:  ICU

## 2019-10-19 NOTE — PROGRESS NOTE ADULT - SUBJECTIVE AND OBJECTIVE BOX
ENT ISSUE/POD: POD #2 for TSR meningioma w septal flap        HPI:  44y POD #2 for TRS meningioma w septal flap, pt doing well, no complaints. PT denies any salty or metallic taste, epistaxis, no PND, no clear nasal d/c, no h/a or pain.             PAST MEDICAL & SURGICAL HISTORY:  Marijuana use, continuous  Meningioma  History of IBS  Chronic headaches: last 3 yrs  Bulging lumbar disc: s/p SARITA  Anxiety  S/P arthroscopic knee surgery: right knee 1993    Allergies    No Known Allergies    Intolerances    Cipro (Stomach Upset)  erythromycin (Stomach Upset)    MEDICATIONS  (STANDING):  ALPRAZolam 0.5 milliGRAM(s) Oral two times a day  ceFAZolin   IVPB 2000 milliGRAM(s) IV Intermittent once  chlorhexidine 4% Liquid 1 Application(s) Topical <User Schedule>  clidinium/chlordiazePOXIDE 1 Capsule(s) Oral <User Schedule>  sertraline 100 milliGRAM(s) Oral daily  sodium chloride 0.65% Nasal 1 Spray(s) Both Nostrils four times a day    MEDICATIONS  (PRN):  acetaminophen   Tablet .. 650 milliGRAM(s) Oral every 6 hours PRN Temp greater or equal to 38C (100.4F), Mild Pain (1 - 3)  methocarbamol 500 milliGRAM(s) Oral every 12 hours PRN Muscle Spasm  ondansetron Injectable 4 milliGRAM(s) IV Push every 30 minutes PRN Nausea and/or Vomiting  traMADol 25 milliGRAM(s) Oral every 6 hours PRN Moderate Pain (4 - 6)  traMADol 50 milliGRAM(s) Oral every 6 hours PRN Severe Pain (7 - 10)  zolpidem 10 milliGRAM(s) Oral at bedtime PRN Insomnia      Social History: see consult    Family history: see consult    ROS:   ENT: all negative except as noted in HPI   Pulm: denies SOB, cough, hemoptysis  Neuro: denies numbness/tingling, loss of sensation  Endo: denies heat/cold intolerance, excessive sweating      Vital Signs Last 24 Hrs  T(C): 36.8 (19 Oct 2019 15:00), Max: 36.9 (19 Oct 2019 07:00)  T(F): 98.3 (19 Oct 2019 15:00), Max: 98.5 (19 Oct 2019 07:00)  HR: 81 (19 Oct 2019 15:00) (63 - 100)  BP: --  BP(mean): --  RR: 16 (19 Oct 2019 10:00) (14 - 20)  SpO2: 98% (19 Oct 2019 15:00) (95% - 100%)                          15.0   26.14 )-----------( 193      ( 17 Oct 2019 22:03 )             44.9    10-18    146<H>  |  109<H>  |  10  ----------------------------<  115<H>  4.0   |  25  |  0.77    Ca    8.6      18 Oct 2019 23:36  Phos  2.0     10-18  Mg     2.6     10-18         PHYSICAL EXAM:  Gen: NAD  Skin: No rashes, bruises, or lesions  Head: Normocephalic, Atraumatic  Face: no edema, erythema, or fluctuance. Parotid glands soft without mass  Eyes: no scleral injection  Nose: minimal oozing, mustache dressing in place   Mouth: No Stridor / Drooling / Trismus.  Mucosa moist, tongue/uvula midline, oropharynx clear  Neck: Flat, supple, no lymphadenopathy, trachea midline, no masses  Lymphatic: No lymphadenopathy  Resp: breathing easily, no stridor  Neuro: facial nerve intact, no facial droop

## 2019-10-19 NOTE — PROGRESS NOTE ADULT - ASSESSMENT
s/p TSP resection of meningioma POD #2  Hx of alcohol/marijuana/benzo abuse    - LD clamped, keep until sunday  per neurosurgery  - monitor for csf leak  - xanax for anxiety   - mri after LD removed  - advance diet as tolerated  - pinedo per urology  - u/o wnl   - am cortisol  - lovonox ppx

## 2019-10-19 NOTE — PROGRESS NOTE ADULT - SUBJECTIVE AND OBJECTIVE BOX
s/p transphenoidal removal of a meningioma   LD remains clamped, no csf leak    Vitals/labs/meds reviewed  alert and oriented x 3, EOMI, facial sensation intact, pupils 2mm equal and reactive, his visual fields are full, strength is 5/5 throughout

## 2019-10-19 NOTE — PROGRESS NOTE ADULT - SUBJECTIVE AND OBJECTIVE BOX
SUMMARY: 45 y/o male w/ complaints of headaches x 3 years, lightheadedness, insomnia and anxiety. He was sent for a brain MRI by his ophthalmologist after noting his left pupil was larger than his right. MRI found a 9mm enhancing mass. He smokes daily marijuana which he claims helps his headaches.   s/p transphenoidal removal of a meningioma (10/17)    ADMISSION SCORES:  GCS: 15    Overnight Events:     ROS: Negative unless specified    VITALS:   T(C): 36.9 (10-19-19 @ 07:00), Max: 36.9 (10-19-19 @ 07:00)  HR: 73 (10-19-19 @ 08:00) (63 - 102)  BP: --  RR: 18 (10-19-19 @ 08:00) (10 - 27)  SpO2: 97% (10-19-19 @ 08:00) (93% - 100%)    10-18-19 @ 07:01  -  10-19-19 @ 07:00  --------------------------------------------------------  IN: 3305 mL / OUT: 2805 mL / NET: 500 mL      LABS:                          15.0   26.14 )-----------( 193      ( 17 Oct 2019 22:03 )             44.9     10-18    146<H>  |  109<H>  |  10  ----------------------------<  115<H>  4.0   |  25  |  0.77    Ca    8.6      18 Oct 2019 23:36  Phos  2.0     10-18  Mg     2.6     10-18        MEDS:  MEDICATIONS  (STANDING):  ALPRAZolam 0.5 milliGRAM(s) Oral two times a day  ceFAZolin   IVPB 2000 milliGRAM(s) IV Intermittent once  chlorhexidine 4% Liquid 1 Application(s) Topical <User Schedule>  clidinium/chlordiazePOXIDE 1 Capsule(s) Oral <User Schedule>  sertraline 100 milliGRAM(s) Oral daily  sodium chloride 0.65% Nasal 1 Spray(s) Both Nostrils four times a day        EXAMINATION:  General:  calm  HEENT:  MMM  Neuro: alert and oriented x 3, EOMI, facial sensation intact, pupils equal and reactive, his visual fields are full, strength is 5/5 throughout   Cards:  RRR  Respiratory:  no respiratory distress  Abdomen:  soft  Extremities:  no edema  Skin:  warm/dry SUMMARY: 45 y/o male w/ complaints of headaches x 3 years, lightheadedness, insomnia and anxiety. He was sent for a brain MRI by his ophthalmologist after noting his left pupil was larger than his right. MRI found a 9mm enhancing mass. He smokes daily marijuana which he claims helps his headaches.   s/p transphenoidal removal of a meningioma (10/17)    ADMISSION SCORES:  GCS: 15    Overnight Events: LD remains clamped. No CSF leak.     ROS: Negative unless specified    VITALS:   T(C): 36.9 (10-19-19 @ 07:00), Max: 36.9 (10-19-19 @ 07:00)  HR: 73 (10-19-19 @ 08:00) (63 - 102)  BP: --  RR: 18 (10-19-19 @ 08:00) (10 - 27)  SpO2: 97% (10-19-19 @ 08:00) (93% - 100%)    10-18-19 @ 07:01  -  10-19-19 @ 07:00  --------------------------------------------------------  IN: 3305 mL / OUT: 2805 mL / NET: 500 mL      LABS:                          15.0   26.14 )-----------( 193      ( 17 Oct 2019 22:03 )             44.9     10-18    146<H>  |  109<H>  |  10  ----------------------------<  115<H>  4.0   |  25  |  0.77    Ca    8.6      18 Oct 2019 23:36  Phos  2.0     10-18  Mg     2.6     10-18        MEDS:  MEDICATIONS  (STANDING):  ALPRAZolam 0.5 milliGRAM(s) Oral two times a day  ceFAZolin   IVPB 2000 milliGRAM(s) IV Intermittent once  chlorhexidine 4% Liquid 1 Application(s) Topical <User Schedule>  clidinium/chlordiazePOXIDE 1 Capsule(s) Oral <User Schedule>  sertraline 100 milliGRAM(s) Oral daily  sodium chloride 0.65% Nasal 1 Spray(s) Both Nostrils four times a day        EXAMINATION:  General:  calm  HEENT:  MMM  Neuro: alert and oriented x 3, EOMI, facial sensation intact, pupils equal and reactive, his visual fields are full, strength is 5/5 throughout   Cards:  RRR  Respiratory:  no respiratory distress  Abdomen:  soft  Extremities:  no edema  Skin:  warm/dry SUMMARY: 45 y/o male w/ complaints of headaches x 3 years, lightheadedness, insomnia and anxiety. He was sent for a brain MRI by his ophthalmologist after noting his left pupil was larger than his right. MRI found a 9mm enhancing mass. He smokes daily marijuana which he claims helps his headaches.   s/p transphenoidal removal of a meningioma (10/17)    ADMISSION SCORES:  GCS: 15    Overnight Events: LD remains clamped. No CSF leak.     ROS: Back spasms, no shortness of breath    VITALS:   T(C): 36.9 (10-19-19 @ 07:00), Max: 36.9 (10-19-19 @ 07:00)  HR: 73 (10-19-19 @ 08:00) (63 - 102)  BP: --  RR: 18 (10-19-19 @ 08:00) (10 - 27)  SpO2: 97% (10-19-19 @ 08:00) (93% - 100%)    10-18-19 @ 07:01  -  10-19-19 @ 07:00  --------------------------------------------------------  IN: 3305 mL / OUT: 2805 mL / NET: 500 mL      LABS:                          15.0   26.14 )-----------( 193      ( 17 Oct 2019 22:03 )             44.9     10-18    146<H>  |  109<H>  |  10  ----------------------------<  115<H>  4.0   |  25  |  0.77    Ca    8.6      18 Oct 2019 23:36  Phos  2.0     10-18  Mg     2.6     10-18        MEDS:  MEDICATIONS  (STANDING):  ALPRAZolam 0.5 milliGRAM(s) Oral two times a day  ceFAZolin   IVPB 2000 milliGRAM(s) IV Intermittent once  chlorhexidine 4% Liquid 1 Application(s) Topical <User Schedule>  clidinium/chlordiazePOXIDE 1 Capsule(s) Oral <User Schedule>  sertraline 100 milliGRAM(s) Oral daily  sodium chloride 0.65% Nasal 1 Spray(s) Both Nostrils four times a day        EXAMINATION:  General:  calm  HEENT:  MMM  Neuro: alert and oriented x 3, EOMI, facial sensation intact, pupils equal and reactive, his visual fields are full, strength is 5/5 throughout   Cards:  RRR  Respiratory:  no respiratory distress  Abdomen:  soft  Extremities:  no edema  Skin:  warm/dry

## 2019-10-20 LAB
ANION GAP SERPL CALC-SCNC: 13 MMOL/L — SIGNIFICANT CHANGE UP (ref 5–17)
BUN SERPL-MCNC: 14 MG/DL — SIGNIFICANT CHANGE UP (ref 7–23)
CALCIUM SERPL-MCNC: 9.4 MG/DL — SIGNIFICANT CHANGE UP (ref 8.4–10.5)
CHLORIDE SERPL-SCNC: 104 MMOL/L — SIGNIFICANT CHANGE UP (ref 96–108)
CO2 SERPL-SCNC: 24 MMOL/L — SIGNIFICANT CHANGE UP (ref 22–31)
CORTIS AM PEAK SERPL-MCNC: 17.8 UG/DL — SIGNIFICANT CHANGE UP (ref 6–18.4)
CREAT SERPL-MCNC: 0.78 MG/DL — SIGNIFICANT CHANGE UP (ref 0.5–1.3)
GLUCOSE SERPL-MCNC: 119 MG/DL — HIGH (ref 70–99)
MAGNESIUM SERPL-MCNC: 2.2 MG/DL — SIGNIFICANT CHANGE UP (ref 1.6–2.6)
PHOSPHATE SERPL-MCNC: 3.3 MG/DL — SIGNIFICANT CHANGE UP (ref 2.5–4.5)
POTASSIUM SERPL-MCNC: 3.9 MMOL/L — SIGNIFICANT CHANGE UP (ref 3.5–5.3)
POTASSIUM SERPL-SCNC: 3.9 MMOL/L — SIGNIFICANT CHANGE UP (ref 3.5–5.3)
SODIUM SERPL-SCNC: 141 MMOL/L — SIGNIFICANT CHANGE UP (ref 135–145)

## 2019-10-20 PROCEDURE — 99233 SBSQ HOSP IP/OBS HIGH 50: CPT

## 2019-10-20 PROCEDURE — 70553 MRI BRAIN STEM W/O & W/DYE: CPT | Mod: 26

## 2019-10-20 RX ORDER — DIAZEPAM 5 MG
5 TABLET ORAL EVERY 8 HOURS
Refills: 0 | Status: DISCONTINUED | OUTPATIENT
Start: 2019-10-20 | End: 2019-10-21

## 2019-10-20 RX ORDER — INFLUENZA VIRUS VACCINE 15; 15; 15; 15 UG/.5ML; UG/.5ML; UG/.5ML; UG/.5ML
0.5 SUSPENSION INTRAMUSCULAR ONCE
Refills: 0 | Status: DISCONTINUED | OUTPATIENT
Start: 2019-10-20 | End: 2019-10-21

## 2019-10-20 RX ADMIN — Medication 1 SPRAY(S): at 18:42

## 2019-10-20 RX ADMIN — TRAMADOL HYDROCHLORIDE 50 MILLIGRAM(S): 50 TABLET ORAL at 13:45

## 2019-10-20 RX ADMIN — METHOCARBAMOL 500 MILLIGRAM(S): 500 TABLET, FILM COATED ORAL at 18:42

## 2019-10-20 RX ADMIN — TRAMADOL HYDROCHLORIDE 50 MILLIGRAM(S): 50 TABLET ORAL at 09:00

## 2019-10-20 RX ADMIN — ZOLPIDEM TARTRATE 10 MILLIGRAM(S): 10 TABLET ORAL at 23:51

## 2019-10-20 RX ADMIN — CHLORHEXIDINE GLUCONATE 1 APPLICATION(S): 213 SOLUTION TOPICAL at 21:43

## 2019-10-20 RX ADMIN — METHOCARBAMOL 500 MILLIGRAM(S): 500 TABLET, FILM COATED ORAL at 05:42

## 2019-10-20 RX ADMIN — Medication 1 SPRAY(S): at 23:35

## 2019-10-20 RX ADMIN — TRAMADOL HYDROCHLORIDE 50 MILLIGRAM(S): 50 TABLET ORAL at 14:45

## 2019-10-20 RX ADMIN — Medication 0.5 MILLIGRAM(S): at 21:43

## 2019-10-20 RX ADMIN — TRAMADOL HYDROCHLORIDE 50 MILLIGRAM(S): 50 TABLET ORAL at 23:35

## 2019-10-20 RX ADMIN — Medication 1 SPRAY(S): at 01:28

## 2019-10-20 RX ADMIN — Medication 1 SPRAY(S): at 05:21

## 2019-10-20 RX ADMIN — POTASSIUM PHOSPHATE, MONOBASIC POTASSIUM PHOSPHATE, DIBASIC 83.33 MILLIMOLE(S): 236; 224 INJECTION, SOLUTION INTRAVENOUS at 01:42

## 2019-10-20 RX ADMIN — SERTRALINE 100 MILLIGRAM(S): 25 TABLET, FILM COATED ORAL at 13:45

## 2019-10-20 RX ADMIN — ENOXAPARIN SODIUM 40 MILLIGRAM(S): 100 INJECTION SUBCUTANEOUS at 18:31

## 2019-10-20 RX ADMIN — TRAMADOL HYDROCHLORIDE 50 MILLIGRAM(S): 50 TABLET ORAL at 07:59

## 2019-10-20 RX ADMIN — Medication 1 SPRAY(S): at 11:48

## 2019-10-20 RX ADMIN — Medication 0.5 MILLIGRAM(S): at 05:21

## 2019-10-20 NOTE — PROGRESS NOTE ADULT - SUBJECTIVE AND OBJECTIVE BOX
ENT ISSUE/POD: POD #3 for TSR meningioma w septal flap    HPI: 44y POD #3 for TRS meningioma w septal flap, pt doing well, no complaints. PT denies any salty or metallic taste, epistaxis, no PND, no clear nasal d/c, no h/a or pain.               PAST MEDICAL & SURGICAL HISTORY:  Marijuana use, continuous  Meningioma  History of IBS  Chronic headaches: last 3 yrs  Bulging lumbar disc: s/p SARITA  Anxiety  S/P arthroscopic knee surgery: right knee 1993    Allergies    No Known Allergies    Intolerances    Cipro (Stomach Upset)  erythromycin (Stomach Upset)    MEDICATIONS  (STANDING):  ALPRAZolam 0.5 milliGRAM(s) Oral two times a day  chlorhexidine 4% Liquid 1 Application(s) Topical <User Schedule>  clidinium/chlordiazePOXIDE 1 Capsule(s) Oral <User Schedule>  enoxaparin Injectable 40 milliGRAM(s) SubCutaneous <User Schedule>  sertraline 100 milliGRAM(s) Oral daily  sodium chloride 0.65% Nasal 1 Spray(s) Both Nostrils four times a day    MEDICATIONS  (PRN):  acetaminophen   Tablet .. 650 milliGRAM(s) Oral every 6 hours PRN Temp greater or equal to 38C (100.4F), Mild Pain (1 - 3)  methocarbamol 500 milliGRAM(s) Oral every 12 hours PRN Muscle Spasm  ondansetron Injectable 4 milliGRAM(s) IV Push every 30 minutes PRN Nausea and/or Vomiting  traMADol 25 milliGRAM(s) Oral every 6 hours PRN Moderate Pain (4 - 6)  traMADol 50 milliGRAM(s) Oral every 6 hours PRN Severe Pain (7 - 10)  zolpidem 10 milliGRAM(s) Oral at bedtime PRN Insomnia      Social History: see consult    Family history: see consult    ROS:   ENT: all negative except as noted in HPI   Pulm: denies SOB, cough, hemoptysis  Neuro: denies numbness/tingling, loss of sensation  Endo: denies heat/cold intolerance, excessive sweating      Vital Signs Last 24 Hrs  T(C): 37.1 (20 Oct 2019 11:00), Max: 37.2 (19 Oct 2019 17:00)  T(F): 98.7 (20 Oct 2019 11:00), Max: 98.9 (19 Oct 2019 17:00)  HR: 62 (20 Oct 2019 11:00) (58 - 83)  BP: --  BP(mean): --  RR: 16 (20 Oct 2019 07:00) (15 - 20)  SpO2: 98% (20 Oct 2019 11:00) (96% - 100%)     10-19    145  |  108  |  10  ----------------------------<  118<H>  4.0   |  26  |  0.76    Ca    8.6      19 Oct 2019 21:24  Phos  1.9     10-19  Mg     2.4     10-19         PHYSICAL EXAM:  Gen: NAD  Skin: No rashes, bruises, or lesions  Head: Normocephalic, Atraumatic  Face: no edema, erythema, or fluctuance. Parotid glands soft without mass  Eyes: no scleral injection  Nose: minimal oozing, mustache dressing in place   Mouth: No Stridor / Drooling / Trismus.  Mucosa moist, tongue/uvula midline, oropharynx clear  Neck: Flat, supple, no lymphadenopathy, trachea midline, no masses  Lymphatic: No lymphadenopathy  Resp: breathing easily, no stridor  Neuro: facial nerve intact, no facial droop

## 2019-10-20 NOTE — PROGRESS NOTE ADULT - SUBJECTIVE AND OBJECTIVE BOX
s/p transphenoidal removal of a meningioma   LD d/c'ed, on leak    Vitals/labs/meds reviewed  alert and oriented x 3, EOMI, facial sensation intact, pupils 2mm equal and reactive, his visual fields are full, strength is 5/5 throughout

## 2019-10-20 NOTE — PROGRESS NOTE ADULT - ASSESSMENT
44M s/p TSP    - cont monitor for leak, keep LD clamped  - f/u endocrine labs  -D/C LD in AM  -MR brain in AM

## 2019-10-20 NOTE — PROGRESS NOTE ADULT - ASSESSMENT
s/p TSP resection of meningioma POD #3  Hx of alcohol/marijuana/benzo abuse    - LD d/c'ed  - monitor for csf leak  - xanax for anxiety   - mri after LD removed  - advance diet as tolerated  - pinedo per urology  - u/o wnl   - am cortisol  - lovonox ppx    awaiting floor transfer

## 2019-10-20 NOTE — PROGRESS NOTE ADULT - SUBJECTIVE AND OBJECTIVE BOX
Patient seen and examined at bedside.    Vital Signs Last 24 Hrs  T(C): 37.2 (19 Oct 2019 23:00), Max: 37.2 (19 Oct 2019 17:00)  T(F): 98.9 (19 Oct 2019 23:00), Max: 98.9 (19 Oct 2019 17:00)  HR: 66 (20 Oct 2019 01:00) (61 - 91)  BP: --  BP(mean): --  RR: 16 (20 Oct 2019 01:00) (15 - 20)  SpO2: 97% (20 Oct 2019 01:00) (95% - 100%)    Exam:  AOx3, FC, EOMI, VFFtC  5/5 throughout, no drift  No leak

## 2019-10-20 NOTE — PROGRESS NOTE ADULT - SUBJECTIVE AND OBJECTIVE BOX
SUMMARY:   45 y/o male w/ complaints of headaches x 3 years, lightheadedness, insomnia and anxiety. He was sent for a brain MRI by his ophthalmologist after noting his left pupil was larger than his right. MRI found a 9mm enhancing mass. He smokes daily marijuana which he claims helps his headaches. s/p transphenoidal removal of a meningioma (10/17)    ADMISSION SCORES:  GCS: 15    Overnight Events: LD remains clamped. No CSF leak.     ROS: Back spasms, no shortness of breath    VITALS/DATA/ORDERS: [x] Reviewed     EXAMINATION:  General:  calm  HEENT:  MMM  Neuro: alert and oriented x 3, EOMI, facial sensation intact, pupils equal and reactive, his visual fields are full, strength is 5/5 throughout   Cards:  RRR  Respiratory:  no respiratory distress  Abdomen:  soft  Extremities:  no edema  Skin:  warm/dry, no cerebrospinal fluid leak

## 2019-10-20 NOTE — PROGRESS NOTE ADULT - ASSESSMENT
s/p TSP resection of meningioma POD # 3  Hx of alcohol/marijuana/benzo abuse    NEURO:  q4h neuro checks  Post-op MRI Brain pending  Pain control w/ Tramadol 25/50 prn  anxiety c/w xanax po bid  C/w home dose Ambien  Contd on home med of Sertraline 100 daily  d/c lumbar drain; lay flat x 2 hours post pull   Muscle relaxant to help back spasm  OOB/PT/OT    CARDS:  -150  CP 10/17 - EKG - wnl; troponin negative     PULM:  sat > 92%  Incentive spirometry, if able    RENAL:  IVL  monitor u/o  pinedo d/c (of note, placed by urology due to hypospadias)    GASTRO: Liquid diet; will add Ensure  Bowel regimen  ---> Stress ulcer prophylaxis:  Not indicated  Home med - Librax; continue    HEME:  monitor H/H    ---> DVT prophylaxis: SCDs, chemoppx   Patient encouraged to walk.     ENDO:  euglycemia    ID:  Monitor for fevers    Code status:  Full code

## 2019-10-21 ENCOUNTER — TRANSCRIPTION ENCOUNTER (OUTPATIENT)
Age: 44
End: 2019-10-21

## 2019-10-21 VITALS
HEART RATE: 61 BPM | DIASTOLIC BLOOD PRESSURE: 71 MMHG | RESPIRATION RATE: 18 BRPM | TEMPERATURE: 98 F | SYSTOLIC BLOOD PRESSURE: 104 MMHG | OXYGEN SATURATION: 97 %

## 2019-10-21 PROCEDURE — 99233 SBSQ HOSP IP/OBS HIGH 50: CPT

## 2019-10-21 PROCEDURE — 99024 POSTOP FOLLOW-UP VISIT: CPT

## 2019-10-21 RX ORDER — POLYETHYLENE GLYCOL 3350 17 G/17G
17 POWDER, FOR SOLUTION ORAL DAILY
Refills: 0 | Status: DISCONTINUED | OUTPATIENT
Start: 2019-10-21 | End: 2019-10-21

## 2019-10-21 RX ORDER — TRAMADOL HYDROCHLORIDE 50 MG/1
1 TABLET ORAL
Qty: 20 | Refills: 0
Start: 2019-10-21 | End: 2019-10-25

## 2019-10-21 RX ORDER — ACETAMINOPHEN 500 MG
2 TABLET ORAL
Qty: 0 | Refills: 0 | DISCHARGE
Start: 2019-10-21

## 2019-10-21 RX ORDER — MULTIVIT WITH MIN/MFOLATE/K2 340-15/3 G
1 POWDER (GRAM) ORAL ONCE
Refills: 0 | Status: COMPLETED | OUTPATIENT
Start: 2019-10-21 | End: 2019-10-21

## 2019-10-21 RX ORDER — DIAZEPAM 5 MG
1 TABLET ORAL
Qty: 9 | Refills: 0
Start: 2019-10-21

## 2019-10-21 RX ADMIN — TRAMADOL HYDROCHLORIDE 50 MILLIGRAM(S): 50 TABLET ORAL at 12:04

## 2019-10-21 RX ADMIN — Medication 1 SPRAY(S): at 12:06

## 2019-10-21 RX ADMIN — TRAMADOL HYDROCHLORIDE 50 MILLIGRAM(S): 50 TABLET ORAL at 06:10

## 2019-10-21 RX ADMIN — TRAMADOL HYDROCHLORIDE 50 MILLIGRAM(S): 50 TABLET ORAL at 00:05

## 2019-10-21 RX ADMIN — SERTRALINE 100 MILLIGRAM(S): 25 TABLET, FILM COATED ORAL at 12:05

## 2019-10-21 RX ADMIN — TRAMADOL HYDROCHLORIDE 50 MILLIGRAM(S): 50 TABLET ORAL at 12:34

## 2019-10-21 RX ADMIN — Medication 0.5 MILLIGRAM(S): at 05:22

## 2019-10-21 RX ADMIN — Medication 1 SPRAY(S): at 05:23

## 2019-10-21 RX ADMIN — Medication 1 BOTTLE: at 12:05

## 2019-10-21 RX ADMIN — TRAMADOL HYDROCHLORIDE 50 MILLIGRAM(S): 50 TABLET ORAL at 05:40

## 2019-10-21 RX ADMIN — POLYETHYLENE GLYCOL 3350 17 GRAM(S): 17 POWDER, FOR SOLUTION ORAL at 12:05

## 2019-10-21 NOTE — DISCHARGE NOTE PROVIDER - CARE PROVIDER_API CALL
Isiah Amado)  Neurological Surgery  [address], [address]  [place] [address]  Phone: [phone]  Fax: [phone]  Follow Up Time:     Sury Stanford)  Otolaryngology  [address], [address]  [place] [address]  Phone: [phone]  Fax: [phone]  Follow Up Time:

## 2019-10-21 NOTE — DISCHARGE NOTE NURSING/CASE MANAGEMENT/SOCIAL WORK - PATIENT PORTAL LINK FT
You can access the FollowMyHealth Patient Portal offered by [facility] by registering at the following website: [email or web address]. By joining [facility]’s FollowMyHealth portal, you will also be able to view your health information using other applications (apps) compatible with our system.

## 2019-10-21 NOTE — PROGRESS NOTE ADULT - ASSESSMENT
44y POD #4 for TSR meningioma w septal flap, pt doing well, LD removed since yesterday with no concern for CSF leak on exam.

## 2019-10-21 NOTE — DISCHARGE NOTE PROVIDER - NSDCACTIVITY_GEN_ALL_CORE
Do not make important decisions/Stairs allowed/No heavy lifting/straining/Walking - Indoors allowed/Walking - Outdoors allowed/Bathing allowed/Do not drive or operate machinery/Showering allowed

## 2019-10-21 NOTE — PROGRESS NOTE ADULT - SUBJECTIVE AND OBJECTIVE BOX
s/p Transphenoidal resection of meningioma POD5    HPI: 44y POD #5 for TSR meningioma w septal flap, pt doing well, no complaints. PT denies any salty or metallic taste, epistaxis, no PND, no clear nasal d/c, no h/a or pain.   LD removed 2 days ago         PAST MEDICAL & SURGICAL HISTORY:  Marijuana use, continuous  Meningioma  History of IBS  Chronic headaches: last 3 yrs  Bulging lumbar disc: s/p SARITA  Anxiety  S/P arthroscopic knee surgery: right knee 1993    Allergies    No Known Allergies    Intolerances    Cipro (Stomach Upset)  erythromycin (Stomach Upset)    MEDICATIONS  (STANDING):  ALPRAZolam 0.5 milliGRAM(s) Oral two times a day  chlorhexidine 4% Liquid 1 Application(s) Topical <User Schedule>  clidinium/chlordiazePOXIDE 1 Capsule(s) Oral <User Schedule>  enoxaparin Injectable 40 milliGRAM(s) SubCutaneous <User Schedule>  influenza   Vaccine 0.5 milliLiter(s) IntraMuscular once  sertraline 100 milliGRAM(s) Oral daily  sodium chloride 0.65% Nasal 1 Spray(s) Both Nostrils four times a day    MEDICATIONS  (PRN):  acetaminophen   Tablet .. 650 milliGRAM(s) Oral every 6 hours PRN Temp greater or equal to 38C (100.4F), Mild Pain (1 - 3)  diazepam    Tablet 5 milliGRAM(s) Oral every 8 hours PRN muscle spasm  methocarbamol 500 milliGRAM(s) Oral every 12 hours PRN Muscle Spasm  ondansetron Injectable 4 milliGRAM(s) IV Push every 30 minutes PRN Nausea and/or Vomiting  traMADol 25 milliGRAM(s) Oral every 6 hours PRN Moderate Pain (4 - 6)  traMADol 50 milliGRAM(s) Oral every 6 hours PRN Severe Pain (7 - 10)  zolpidem 10 milliGRAM(s) Oral at bedtime PRN Insomnia    ICU Vital Signs Last 24 Hrs  T(C): 36.9 (21 Oct 2019 07:00), Max: 37.1 (20 Oct 2019 11:00)  T(F): 98.4 (21 Oct 2019 07:00), Max: 98.8 (20 Oct 2019 15:00)  HR: 58 (21 Oct 2019 07:00) (49 - 68)  BP: 90/73 (21 Oct 2019 07:00) (90/73 - 98/73)  BP(mean): 78 (21 Oct 2019 07:00) (72 - 81)  ABP: 105/62 (20 Oct 2019 19:00) (105/62 - 126/70)  ABP(mean): 79 (20 Oct 2019 19:00) (79 - 91)  RR: 18 (21 Oct 2019 07:00) (16 - 18)  SpO2: 95% (21 Oct 2019 07:00) (95% - 99%)      10-20    141  |  104  |  14  ----------------------------<  119<H>  3.9   |  24  |  0.78    Ca    9.4      20 Oct 2019 22:04  Phos  3.3     10-20  Mg     2.2     10-20        ROS:   ENT: all negative except as noted in HPI   Pulm: denies SOB, cough, hemoptysis  Neuro: denies numbness/tingling, loss of sensation  Endo: denies heat/cold intolerance, excessive sweating      PHYSICAL EXAM:    HEENT: moustache dressing dry   Neuro: intact  lumbar drain exit site (removed) dry  pulm: CTA  CV s1S2   abd soft - no BM since admission s/p Transphenoidal resection of meningioma POD5    HPI: 44y POD #5 for TSR meningioma w septal flap, pt doing well, no complaints. PT denies any salty or metallic taste, epistaxis, no PND, no clear nasal d/c, no h/a or pain.   LD removed yesterday.     PAST MEDICAL & SURGICAL HISTORY:  Marijuana use, continuous  Meningioma  History of IBS  Chronic headaches: last 3 yrs  Bulging lumbar disc: s/p SARITA  Anxiety  S/P arthroscopic knee surgery: right knee 1993    Allergies    No Known Allergies    Intolerances    Cipro (Stomach Upset)  erythromycin (Stomach Upset)    MEDICATIONS  (STANDING):  ALPRAZolam 0.5 milliGRAM(s) Oral two times a day  chlorhexidine 4% Liquid 1 Application(s) Topical <User Schedule>  clidinium/chlordiazePOXIDE 1 Capsule(s) Oral <User Schedule>  enoxaparin Injectable 40 milliGRAM(s) SubCutaneous <User Schedule>  influenza   Vaccine 0.5 milliLiter(s) IntraMuscular once  sertraline 100 milliGRAM(s) Oral daily  sodium chloride 0.65% Nasal 1 Spray(s) Both Nostrils four times a day    MEDICATIONS  (PRN):  acetaminophen   Tablet .. 650 milliGRAM(s) Oral every 6 hours PRN Temp greater or equal to 38C (100.4F), Mild Pain (1 - 3)  diazepam    Tablet 5 milliGRAM(s) Oral every 8 hours PRN muscle spasm  methocarbamol 500 milliGRAM(s) Oral every 12 hours PRN Muscle Spasm  ondansetron Injectable 4 milliGRAM(s) IV Push every 30 minutes PRN Nausea and/or Vomiting  traMADol 25 milliGRAM(s) Oral every 6 hours PRN Moderate Pain (4 - 6)  traMADol 50 milliGRAM(s) Oral every 6 hours PRN Severe Pain (7 - 10)  zolpidem 10 milliGRAM(s) Oral at bedtime PRN Insomnia    ICU Vital Signs Last 24 Hrs  T(C): 36.9 (21 Oct 2019 07:00), Max: 37.1 (20 Oct 2019 11:00)  T(F): 98.4 (21 Oct 2019 07:00), Max: 98.8 (20 Oct 2019 15:00)  HR: 58 (21 Oct 2019 07:00) (49 - 68)  BP: 90/73 (21 Oct 2019 07:00) (90/73 - 98/73)  BP(mean): 78 (21 Oct 2019 07:00) (72 - 81)  ABP: 105/62 (20 Oct 2019 19:00) (105/62 - 126/70)  ABP(mean): 79 (20 Oct 2019 19:00) (79 - 91)  RR: 18 (21 Oct 2019 07:00) (16 - 18)  SpO2: 95% (21 Oct 2019 07:00) (95% - 99%)      10-20    141  |  104  |  14  ----------------------------<  119<H>  3.9   |  24  |  0.78    Ca    9.4      20 Oct 2019 22:04  Phos  3.3     10-20  Mg     2.2     10-20        ROS:   ENT: all negative except as noted in HPI   Pulm: denies SOB, cough, hemoptysis  Neuro: denies numbness/tingling, loss of sensation  Endo: denies heat/cold intolerance, excessive sweating      PHYSICAL EXAM:    HEENT: moustache dressing dry   Neuro: intact  lumbar drain exit site (removed) dry  pulm: CTA  CV s1S2   abd soft - no BM since admission

## 2019-10-21 NOTE — DISCHARGE NOTE PROVIDER - HOSPITAL COURSE
TSR meningioma w septal flap on 10/17.  Lumbar drain placed intra op in case of CSF leak post op.     Tolerated procedure well, no evidence of CSF leak post op.  Lumbar drain removed 10/19.  No evidence of leak        Post op MRI looks good, patient ambulatory and feels well to go home

## 2019-10-21 NOTE — DISCHARGE NOTE PROVIDER - CARE PROVIDERS DIRECT ADDRESSES
[email or web address]

## 2019-10-21 NOTE — PROGRESS NOTE ADULT - PROBLEM SELECTOR PLAN 1
- continue humidified face tent  - nasal saline, 2 sprays to b/l nares 4 times a day.  - monitor for signs of Epistaxis and CSF leak  - avoid nasal trauma, heavy lifting and bending at waist.  - Care a/p neurosurgery.
- continue humidified face tent  - nasal saline, 2 sprays to b/l nares 4 times a day.  - monitor for signs of Epistaxis and CSF leak  - avoid nasal trauma, heavy lifting and bending at waist.  - Care a/p neurosurgery
- continue humidified face tent  - nasal saline, 2 sprays to b/l nares 4 times a day.  - monitor for signs of Epistaxis and CSF leak  - avoid nasal trauma, heavy lifting and bending at waist.  - Care a/p neurosurgery.
- continue humidified face tent  - nasal saline, 2 sprays to b/l nares 4 times a day.  - monitor for signs of Epistaxis and CSF leak  - avoid nasal trauma, heavy lifting and bending at waist.  - Care a/p neurosurgery.
continue humidified face tent  - nasal saline, 2 sprays to b/l nares 4 times a day.  - monitor for signs of Epistaxis and CSF leak  - avoid nasal trauma, heavy lifting and bending at waist.  - Dispo per neurosurgery, pending MRI  -Instructions for neilmed nasal rinse given for home use  ENT will continue to follow

## 2019-10-21 NOTE — PROGRESS NOTE ADULT - SUBJECTIVE AND OBJECTIVE BOX
Patient seen and examined at bedside.    --Anticoagulation--  enoxaparin Injectable 40 milliGRAM(s) SubCutaneous <User Schedule>    T(C): 37.1 (10-20-19 @ 23:20), Max: 37.1 (10-20-19 @ 11:00)  HR: 49 (10-20-19 @ 23:20) (49 - 83)  BP: 98/73 (10-20-19 @ 23:20) (98/73 - 98/73)  RR: 18 (10-20-19 @ 23:20) (15 - 18)  SpO2: 98% (10-20-19 @ 23:20) (96% - 100%)  Wt(kg): --    Exam:  AOx3, FC, PERRL, EOMI, no facial   5/5 throughout, no drift

## 2019-10-21 NOTE — PROGRESS NOTE ADULT - PROVIDER SPECIALTY LIST ADULT
ENT
NSICU
Neurosurgery
ENT

## 2019-10-21 NOTE — DISCHARGE NOTE PROVIDER - NSDCCPCAREPLAN_GEN_ALL_CORE_FT
PRINCIPAL DISCHARGE DIAGNOSIS  Diagnosis: Benign tumor of sella turcica  Assessment and Plan of Treatment: follow up with Dr. Stanford, Dr. Amado and optho

## 2019-10-21 NOTE — PROGRESS NOTE ADULT - SUBJECTIVE AND OBJECTIVE BOX
ENT ISSUE/POD: s/p Transphenoidal resection of meningioma POD4    HPI: 44y POD #4 for TSR meningioma w septal flap, pt doing well, no complaints. PT denies any salty or metallic taste, epistaxis, no PND, no clear nasal d/c, no h/a or pain.   LD removed since yesterday        PAST MEDICAL & SURGICAL HISTORY:  Marijuana use, continuous  Meningioma  History of IBS  Chronic headaches: last 3 yrs  Bulging lumbar disc: s/p SARITA  Anxiety  S/P arthroscopic knee surgery: right knee 1993    Allergies    No Known Allergies    Intolerances    Cipro (Stomach Upset)  erythromycin (Stomach Upset)    MEDICATIONS  (STANDING):  ALPRAZolam 0.5 milliGRAM(s) Oral two times a day  chlorhexidine 4% Liquid 1 Application(s) Topical <User Schedule>  clidinium/chlordiazePOXIDE 1 Capsule(s) Oral <User Schedule>  enoxaparin Injectable 40 milliGRAM(s) SubCutaneous <User Schedule>  influenza   Vaccine 0.5 milliLiter(s) IntraMuscular once  sertraline 100 milliGRAM(s) Oral daily  sodium chloride 0.65% Nasal 1 Spray(s) Both Nostrils four times a day    MEDICATIONS  (PRN):  acetaminophen   Tablet .. 650 milliGRAM(s) Oral every 6 hours PRN Temp greater or equal to 38C (100.4F), Mild Pain (1 - 3)  diazepam    Tablet 5 milliGRAM(s) Oral every 8 hours PRN muscle spasm  methocarbamol 500 milliGRAM(s) Oral every 12 hours PRN Muscle Spasm  ondansetron Injectable 4 milliGRAM(s) IV Push every 30 minutes PRN Nausea and/or Vomiting  traMADol 25 milliGRAM(s) Oral every 6 hours PRN Moderate Pain (4 - 6)  traMADol 50 milliGRAM(s) Oral every 6 hours PRN Severe Pain (7 - 10)  zolpidem 10 milliGRAM(s) Oral at bedtime PRN Insomnia        ROS:   ENT: all negative except as noted in HPI   Pulm: denies SOB, cough, hemoptysis  Neuro: denies numbness/tingling, loss of sensation  Endo: denies heat/cold intolerance, excessive sweating      Vital Signs Last 24 Hrs  T(C): 36.6 (21 Oct 2019 05:00), Max: 37.1 (20 Oct 2019 11:00)  T(F): 97.8 (21 Oct 2019 05:00), Max: 98.8 (20 Oct 2019 15:00)  HR: 53 (21 Oct 2019 05:00) (49 - 68)  BP: 94/61 (21 Oct 2019 05:00) (94/61 - 98/73)  BP(mean): 72 (21 Oct 2019 05:00) (72 - 81)  RR: 18 (20 Oct 2019 23:20) (16 - 18)  SpO2: 95% (21 Oct 2019 05:00) (95% - 100%)     10-20    141  |  104  |  14  ----------------------------<  119<H>  3.9   |  24  |  0.78    Ca    9.4      20 Oct 2019 22:04  Phos  3.3     10-20  Mg     2.2     10-20         PHYSICAL EXAM:  Gen: NAD  Skin: No rashes, bruises, or lesions  Head: Normocephalic, Atraumatic  Face: no edema, erythema, or fluctuance. Parotid glands soft without mass  Eyes: no scleral injection  Nose: minimal oozing, mustache dressing in place   Mouth: No Stridor / Drooling / Trismus.  Mucosa moist, tongue/uvula midline, oropharynx clear  Neck: Flat, supple, no lymphadenopathy, trachea midline, no masses  Lymphatic: No lymphadenopathy  Resp: breathing easily, no stridor  Neuro: facial nerve intact, no facial droop

## 2019-10-21 NOTE — PROGRESS NOTE ADULT - ASSESSMENT
ASSESSMENT/PLAN:    44y POD #5 for TSR meningioma w septal flap, pt doing well, no complaints. PT denies any salty or metallic taste, epistaxis, no PND, no clear nasal d/c, no h/a or pain.   LD removed 2 days ago       NEURO:  Activity: mobilize, possibly d/c home later, follow up MRI results  PULM:  room air, TSP precautions     CV:  SBP goal < 150     RENAL: IVL, Na WNL    GI:  Diet:  GI prophylaxis [x] not indicated [] PPI [] other:  Bowel regimen - no BM since admission - will add Miralax and mag citrate     ENDO:   Goal euglycemia (-180)    HEME/ONC:  VTE prophylaxis: [] SCDs [x] chemoprophylaxis [] high risk of DVT/PE on admission due to:    ID: afebrile    MISC:    SOCIAL/FAMILY:  [] awaiting [] updated at bedside [] family meeting    CODE STATUS:  [] Full Code [] DNR [] DNI [] Palliative/Comfort Care    DISPOSITION:  [] ICU [] Stroke Unit [x] Floor [] EMU [] RCU [] PCU    [] Patient is at high risk of neurologic deterioration/death due to:     Time seen:  Time spent: ___ [] critical care minutes ASSESSMENT/PLAN:    44y POD #5 for TSR meningioma w septal flap, pt doing well, no complaints. PT denies any salty or metallic taste, epistaxis, no PND, no clear nasal d/c, no h/a or pain.   LD removed 2 days ago       NEURO:  Activity: mobilize, follow up MRI results  PULM:  room air, TSP precautions     CV:  SBP goal < 150mmHg     RENAL: IVL, Na WNL    GI:  Diet:  GI prophylaxis [x] not indicated [] PPI [] other:  Bowel regimen - no BM since admission - will add Miralax and mag citrate     ENDO:   Goal euglycemia (-180)    HEME/ONC:  VTE prophylaxis: [] SCDs [x] chemoprophylaxis [] high risk of DVT/PE on admission due to:    ID: afebrile    SOCIAL/FAMILY:  [x] awaiting [] updated at bedside [] family meeting    CODE STATUS:  [] Full Code [] DNR [] DNI [] Palliative/Comfort Care    DISPOSITION:  [] ICU [] Stroke Unit [x] Floor [] EMU [] RCU [] PCU

## 2019-10-22 ENCOUNTER — RECORD ABSTRACTING (OUTPATIENT)
Age: 44
End: 2019-10-22

## 2019-10-23 ENCOUNTER — MEDICATION RENEWAL (OUTPATIENT)
Age: 44
End: 2019-10-23

## 2019-10-23 LAB — SURGICAL PATHOLOGY STUDY: SIGNIFICANT CHANGE UP

## 2019-10-24 NOTE — DISCUSSION/SUMMARY
[Home] : patient was discharged to home [FreeTextEntry1] : Spoke to patient about recent admission to [facility] on 10/17/19-10/21/19 for a TSR resection of \par Menigioma on 10/17/19. Patient states he is able to tolerate pain with current pain medications but he has been having frequent cold and hot sweats. Patient denies any bleeding or leakage from nose. Patient will make an appointment to come see Dr. Castro, verbalized understanding.  [Follow Up Appt with Provider within 7 days] : follow up appt with provider within 7 days

## 2019-10-25 ENCOUNTER — APPOINTMENT (OUTPATIENT)
Dept: OTOLARYNGOLOGY | Facility: CLINIC | Age: 44
End: 2019-10-25
Payer: MEDICAID

## 2019-10-25 ENCOUNTER — APPOINTMENT (OUTPATIENT)
Dept: SPINE | Facility: CLINIC | Age: 44
End: 2019-10-25
Payer: MEDICAID

## 2019-10-25 VITALS
SYSTOLIC BLOOD PRESSURE: 100 MMHG | HEIGHT: 67 IN | WEIGHT: 142 LBS | DIASTOLIC BLOOD PRESSURE: 61 MMHG | BODY MASS INDEX: 22.29 KG/M2 | HEART RATE: 54 BPM

## 2019-10-25 VITALS
SYSTOLIC BLOOD PRESSURE: 102 MMHG | OXYGEN SATURATION: 99 % | RESPIRATION RATE: 14 BRPM | HEIGHT: 67 IN | HEART RATE: 62 BPM | DIASTOLIC BLOOD PRESSURE: 61 MMHG

## 2019-10-25 VITALS — BODY MASS INDEX: 22.24 KG/M2 | WEIGHT: 142 LBS

## 2019-10-25 DIAGNOSIS — M54.2 CERVICALGIA: ICD-10-CM

## 2019-10-25 PROCEDURE — 31231 NASAL ENDOSCOPY DX: CPT | Mod: 58

## 2019-10-25 PROCEDURE — 99213 OFFICE O/P EST LOW 20 MIN: CPT

## 2019-10-25 PROCEDURE — 99024 POSTOP FOLLOW-UP VISIT: CPT

## 2019-10-25 RX ORDER — OXYCODONE AND ACETAMINOPHEN 5; 325 MG/1; MG/1
5-325 TABLET ORAL EVERY 6 HOURS
Qty: 28 | Refills: 0 | Status: DISCONTINUED | COMMUNITY
Start: 2019-10-23 | End: 2019-10-25

## 2019-10-25 NOTE — REASON FOR VISIT
[de-identified] : 10/17/2019 [de-identified] : The patient is here for a postoperative exam and removal of lumbar drain sutures.  He stated that he continues with headaches to the top of his head and neck pain and that this is improving.  He denies having continuous drainage from his nose but stated that he had some drainage at night and used the nasal wash 40 minutes ago,so is having some clear drainage. [de-identified] : Endoscopic transnasal transsphenoidal approach to the anterior skull base with opening of the anterior skull base.  Resection of tumor.  Use of stereotactic guidance and placement of a lumbar drain.

## 2019-10-25 NOTE — PHYSICAL EXAM
[] : septum deviated to the right [Normal] : inferior turbinates and middle turbinates are normal [de-identified] : crusting bilaterally

## 2019-10-25 NOTE — PHYSICAL EXAM
[Clean] : clean [Dry] : dry [Intact] : intact [Normal Skin] : normal [No Drainage] : without drainage [Normal Skin Turgor] : skin turgor was normal [Person] : oriented to person [Time] : oriented to time [Place] : oriented to place [Short Term Intact] : short term memory intact [Remote Intact] : remote memory intact [Concentration Intact] : normal concentrating ability [Span Intact] : the attention span was normal [Fluency] : fluency intact [Current Events] : adequate knowledge of current events [Comprehension] : comprehension intact [Vocabulary] : adequate range of vocabulary [Past History] : adequate knowledge of personal past history [Cranial Nerves Oculomotor (III)] : extraocular motion intact [Cranial Nerves Optic (II)] : visual acuity intact bilaterally,  pupils equal round and reactive to light [Cranial Nerves Trigeminal (V)] : facial sensation intact symmetrically [Cranial Nerves Facial (VII)] : face symmetrical [Cranial Nerves Vestibulocochlear (VIII)] : hearing was intact bilaterally [Cranial Nerves Glossopharyngeal (IX)] : tongue and palate midline [Cranial Nerves Accessory (XI - Cranial And Spinal)] : head turning and shoulder shrug symmetric [Cranial Nerves Hypoglossal (XII)] : there was no tongue deviation with protrusion [Motor Strength] : muscle strength was normal in all four extremities [Motor Tone] : muscle tone was normal in all four extremities [No Muscle Atrophy] : normal bulk in all four extremities [Sensation Tactile Decrease] : light touch was intact [Balance] : balance was intact [Abnormal Walk] : normal gait [2+] : Patella left 2+ [FreeTextEntry1] : sutures where the lumbar drain were placed are dry and intact [Past-pointing] : there was no past-pointing [FreeTextEntry5] : No evidence of CSF leak. [Tremor] : no tremor present

## 2019-10-25 NOTE — CONSULT LETTER
[Dear  ___] : Dear  [unfilled], [Courtesy Letter:] : I had the pleasure of seeing your patient, [unfilled], in my office today. [Consult Closing:] : Thank you very much for allowing me to participate in the care of this patient.  If you have any questions, please do not hesitate to contact me. [Please see my note below.] : Please see my note below. [Sincerely,] : Sincerely, [FreeTextEntry3] : Sury Stanford MD\par Otolaryngology and Cranial Base Surgery\par Attending Physician - Department of Otolaryngology and Head & Neck Surgery\par [facility]\par  - [employer] [facility]\par Office: [phone]\par Fax: [phone]\par

## 2019-10-25 NOTE — HISTORY OF PRESENT ILLNESS
[de-identified] : Going for TSPR with septoplasty with Dr. Amado 10/17/19.  \par Continues to have minimal blood tinged mucus d/c.  No active bleeding.  No clear nasal d/c.  Now has headache, greatest over left and occipital areas.  Also some sharp pain around left eye.  Pos nasal congestion.  No sense of smell and poor sense of taste.  Pos Photophobia.  Using nasal saline.  has not started Sinus wash yet.

## 2019-10-25 NOTE — ASSESSMENT
[FreeTextEntry1] : Planum Meningioma:\par - start saline irrigations BID\par - f/u 2 weeks to recheck

## 2019-10-25 NOTE — DATA REVIEWED
[de-identified] : The preoperative images from [facility] done on 10/16/2019 were compared to 10/20/2019.

## 2019-10-25 NOTE — ASSESSMENT
[FreeTextEntry1] : The preoperative images were reviewed and discussed with the postoperative images.  The lumbar sutures were removed.  Wound care and activity levels were discussed.  The patient was provided with a prescription for physical therapy to treat neck pain.  He stated that the oxycodone kept him up at night.  He was provided with a prescription for acetaminophen with codeine #3.  Instructions for administration, side effects, and interactions were discussed.  He is to return to the office in one month for postoperative evaluation and is to return in three months with a new MRI of the brain and sella for review.

## 2019-10-25 NOTE — HISTORY OF PRESENT ILLNESS
[FreeTextEntry1] : Kishore Garcia is a 44 year old gentleman who experienced migrainous type headaches associated with light sensitivity. He had an opthalmology exam and was found to have anisocoria.  The patient had an MRI and was found to have a planum meningioma.  He underwent an endoscopic transnasal transsphenoidal approach to the anterior skull base with opening of the anterior skull base for removal of the meningioma on 10/17/2019.  The patient is also to follow up with Dr. Sury Stanford today.

## 2019-10-25 NOTE — PROCEDURE
[FreeTextEntry6] : Afrin and lidocaine were topically sprayed. Flexible scope #2 was used. Right nasal passage with nasopore. Exposed cartilage along right septum is mildly deviated to right. Nasal passage with mucoid secretions. Left nasal passage with nasopore. Septectomy with nasopore. No evidence of CSF leak. Nasal passage with mucoid secretions.. Nasopharynx clear.

## 2019-10-28 ENCOUNTER — APPOINTMENT (OUTPATIENT)
Dept: OTOLARYNGOLOGY | Facility: CLINIC | Age: 44
End: 2019-10-28

## 2019-10-29 ENCOUNTER — APPOINTMENT (OUTPATIENT)
Dept: INTERNAL MEDICINE | Facility: CLINIC | Age: 44
End: 2019-10-29

## 2019-10-29 ENCOUNTER — APPOINTMENT (OUTPATIENT)
Dept: OTOLARYNGOLOGY | Facility: CLINIC | Age: 44
End: 2019-10-29
Payer: MEDICAID

## 2019-10-29 ENCOUNTER — EMERGENCY (EMERGENCY)
Facility: HOSPITAL | Age: 44
LOS: 1 days | Discharge: HOME | End: 2019-10-29
Attending: EMERGENCY MEDICINE
Payer: MEDICAID

## 2019-10-29 VITALS
BODY MASS INDEX: 22.29 KG/M2 | DIASTOLIC BLOOD PRESSURE: 67 MMHG | HEART RATE: 64 BPM | WEIGHT: 142 LBS | SYSTOLIC BLOOD PRESSURE: 98 MMHG | HEIGHT: 67 IN

## 2019-10-29 VITALS
WEIGHT: 141.98 LBS | TEMPERATURE: 98 F | SYSTOLIC BLOOD PRESSURE: 117 MMHG | RESPIRATION RATE: 16 BRPM | HEIGHT: 67 IN | OXYGEN SATURATION: 100 % | HEART RATE: 60 BPM | DIASTOLIC BLOOD PRESSURE: 77 MMHG

## 2019-10-29 VITALS
DIASTOLIC BLOOD PRESSURE: 73 MMHG | RESPIRATION RATE: 19 BRPM | HEART RATE: 77 BPM | OXYGEN SATURATION: 100 % | SYSTOLIC BLOOD PRESSURE: 110 MMHG

## 2019-10-29 DIAGNOSIS — Z98.890 OTHER SPECIFIED POSTPROCEDURAL STATES: Chronic | ICD-10-CM

## 2019-10-29 DIAGNOSIS — R04.0 EPISTAXIS: ICD-10-CM

## 2019-10-29 LAB
ANION GAP SERPL CALC-SCNC: 12 MMOL/L — SIGNIFICANT CHANGE UP (ref 5–17)
BASE EXCESS BLDV CALC-SCNC: 3.6 MMOL/L — HIGH (ref -2–2)
BASOPHILS # BLD AUTO: 0.02 K/UL — SIGNIFICANT CHANGE UP (ref 0–0.2)
BASOPHILS NFR BLD AUTO: 0.3 % — SIGNIFICANT CHANGE UP (ref 0–2)
BUN SERPL-MCNC: 17 MG/DL — SIGNIFICANT CHANGE UP (ref 7–23)
CA-I SERPL-SCNC: 1.23 MMOL/L — SIGNIFICANT CHANGE UP (ref 1.12–1.3)
CALCIUM SERPL-MCNC: 9.8 MG/DL — SIGNIFICANT CHANGE UP (ref 8.4–10.5)
CHLORIDE BLDV-SCNC: 104 MMOL/L — SIGNIFICANT CHANGE UP (ref 96–108)
CHLORIDE SERPL-SCNC: 101 MMOL/L — SIGNIFICANT CHANGE UP (ref 96–108)
CO2 BLDV-SCNC: 33 MMOL/L — HIGH (ref 22–30)
CO2 SERPL-SCNC: 25 MMOL/L — SIGNIFICANT CHANGE UP (ref 22–31)
CREAT SERPL-MCNC: 0.87 MG/DL — SIGNIFICANT CHANGE UP (ref 0.5–1.3)
EOSINOPHIL # BLD AUTO: 0.16 K/UL — SIGNIFICANT CHANGE UP (ref 0–0.5)
EOSINOPHIL NFR BLD AUTO: 2.3 % — SIGNIFICANT CHANGE UP (ref 0–6)
GAS PNL BLDV: 137 MMOL/L — SIGNIFICANT CHANGE UP (ref 135–145)
GAS PNL BLDV: SIGNIFICANT CHANGE UP
GAS PNL BLDV: SIGNIFICANT CHANGE UP
GLUCOSE BLDV-MCNC: 94 MG/DL — SIGNIFICANT CHANGE UP (ref 70–99)
GLUCOSE SERPL-MCNC: 91 MG/DL — SIGNIFICANT CHANGE UP (ref 70–99)
HCO3 BLDV-SCNC: 31 MMOL/L — HIGH (ref 21–29)
HCT VFR BLD CALC: 43.4 % — SIGNIFICANT CHANGE UP (ref 39–50)
HCT VFR BLDA CALC: 45 % — SIGNIFICANT CHANGE UP (ref 39–50)
HGB BLD CALC-MCNC: 14.8 G/DL — SIGNIFICANT CHANGE UP (ref 13–17)
HGB BLD-MCNC: 14.8 G/DL — SIGNIFICANT CHANGE UP (ref 13–17)
HOROWITZ INDEX BLDV+IHG-RTO: SIGNIFICANT CHANGE UP
IMM GRANULOCYTES NFR BLD AUTO: 0.4 % — SIGNIFICANT CHANGE UP (ref 0–1.5)
LACTATE BLDV-MCNC: 1.8 MMOL/L — SIGNIFICANT CHANGE UP (ref 0.7–2)
LYMPHOCYTES # BLD AUTO: 2.03 K/UL — SIGNIFICANT CHANGE UP (ref 1–3.3)
LYMPHOCYTES # BLD AUTO: 29.2 % — SIGNIFICANT CHANGE UP (ref 13–44)
MCHC RBC-ENTMCNC: 29 PG — SIGNIFICANT CHANGE UP (ref 27–34)
MCHC RBC-ENTMCNC: 34.1 GM/DL — SIGNIFICANT CHANGE UP (ref 32–36)
MCV RBC AUTO: 85.1 FL — SIGNIFICANT CHANGE UP (ref 80–100)
MONOCYTES # BLD AUTO: 0.56 K/UL — SIGNIFICANT CHANGE UP (ref 0–0.9)
MONOCYTES NFR BLD AUTO: 8 % — SIGNIFICANT CHANGE UP (ref 2–14)
NEUTROPHILS # BLD AUTO: 4.16 K/UL — SIGNIFICANT CHANGE UP (ref 1.8–7.4)
NEUTROPHILS NFR BLD AUTO: 59.8 % — SIGNIFICANT CHANGE UP (ref 43–77)
NRBC # BLD: 0 /100 WBCS — SIGNIFICANT CHANGE UP (ref 0–0)
PCO2 BLDV: 60 MMHG — HIGH (ref 35–50)
PH BLDV: 7.33 — LOW (ref 7.35–7.45)
PLATELET # BLD AUTO: 218 K/UL — SIGNIFICANT CHANGE UP (ref 150–400)
PO2 BLDV: 25 MMHG — SIGNIFICANT CHANGE UP (ref 25–45)
POTASSIUM BLDV-SCNC: 3.9 MMOL/L — SIGNIFICANT CHANGE UP (ref 3.5–5.3)
POTASSIUM SERPL-MCNC: 4.7 MMOL/L — SIGNIFICANT CHANGE UP (ref 3.5–5.3)
POTASSIUM SERPL-SCNC: 4.7 MMOL/L — SIGNIFICANT CHANGE UP (ref 3.5–5.3)
RBC # BLD: 5.1 M/UL — SIGNIFICANT CHANGE UP (ref 4.2–5.8)
RBC # FLD: 12.6 % — SIGNIFICANT CHANGE UP (ref 10.3–14.5)
SAO2 % BLDV: 35 % — LOW (ref 67–88)
SODIUM SERPL-SCNC: 138 MMOL/L — SIGNIFICANT CHANGE UP (ref 135–145)
WBC # BLD: 6.96 K/UL — SIGNIFICANT CHANGE UP (ref 3.8–10.5)
WBC # FLD AUTO: 6.96 K/UL — SIGNIFICANT CHANGE UP (ref 3.8–10.5)

## 2019-10-29 PROCEDURE — 82330 ASSAY OF CALCIUM: CPT

## 2019-10-29 PROCEDURE — 99024 POSTOP FOLLOW-UP VISIT: CPT

## 2019-10-29 PROCEDURE — 84295 ASSAY OF SERUM SODIUM: CPT

## 2019-10-29 PROCEDURE — 84132 ASSAY OF SERUM POTASSIUM: CPT

## 2019-10-29 PROCEDURE — 82947 ASSAY GLUCOSE BLOOD QUANT: CPT

## 2019-10-29 PROCEDURE — 82435 ASSAY OF BLOOD CHLORIDE: CPT

## 2019-10-29 PROCEDURE — 83605 ASSAY OF LACTIC ACID: CPT

## 2019-10-29 PROCEDURE — 82565 ASSAY OF CREATININE: CPT

## 2019-10-29 PROCEDURE — 93010 ELECTROCARDIOGRAM REPORT: CPT

## 2019-10-29 PROCEDURE — 93005 ELECTROCARDIOGRAM TRACING: CPT

## 2019-10-29 PROCEDURE — 85027 COMPLETE CBC AUTOMATED: CPT

## 2019-10-29 PROCEDURE — 82962 GLUCOSE BLOOD TEST: CPT

## 2019-10-29 PROCEDURE — 31237 NSL/SINS NDSC SURG BX POLYPC: CPT | Mod: LT

## 2019-10-29 PROCEDURE — 82803 BLOOD GASES ANY COMBINATION: CPT

## 2019-10-29 PROCEDURE — 99284 EMERGENCY DEPT VISIT MOD MDM: CPT

## 2019-10-29 PROCEDURE — 80048 BASIC METABOLIC PNL TOTAL CA: CPT

## 2019-10-29 PROCEDURE — 85014 HEMATOCRIT: CPT

## 2019-10-29 PROCEDURE — 99283 EMERGENCY DEPT VISIT LOW MDM: CPT

## 2019-10-29 RX ORDER — SODIUM CHLORIDE 9 MG/ML
1000 INJECTION INTRAMUSCULAR; INTRAVENOUS; SUBCUTANEOUS ONCE
Refills: 0 | Status: COMPLETED | OUTPATIENT
Start: 2019-10-29 | End: 2019-10-29

## 2019-10-29 RX ADMIN — SODIUM CHLORIDE 1000 MILLILITER(S): 9 INJECTION INTRAMUSCULAR; INTRAVENOUS; SUBCUTANEOUS at 10:27

## 2019-10-29 NOTE — CONSULT NOTE ADULT - PROBLEM SELECTOR RECOMMENDATION 9
- gram-positive abx coverage for duration of packing placement  - Strict blood pressure control.  - Nasal saline, 2 sprays to both nares 4 times a day  - Avoid nasal trauma; no nose rubbing, blowing or manipulating nasal packing.  - - Sneeze with mouth open and pinching nares.  - Avoid bending with head blow the waist.    - No heavy lifting - Patient should follow up in ENT office as an outpatient. May see Dr. Stanford or Kelvin. Call [phone].   - gram-positive abx coverage for duration of packing placement  - Strict blood pressure control.  - Nasal saline, 2 sprays to both nares 4 times a day  - Avoid nasal trauma; no nose rubbing, blowing or manipulating nasal packing.  - - Sneeze with mouth open and pinching nares.  - Avoid bending with head blow the waist.    - No heavy lifting

## 2019-10-29 NOTE — ASSESSMENT
[FreeTextEntry1] : Mr. PARKER is a 44 year male s/p transsphenoidal approach to meningioma on 10/17/19, has had 3 days of intermittent bleeding after using jamie med.   Currently not bleeing, area by the skull base packed gently with nasopore.  Pt had vasovagal reaction following packing.  Laid flat and allowed to return to normal with ice pack.  After several minutes he felt normal again.  following this he again had a vasovagal reaction on his way out.  Decision made to send him to the hospital now.  \par - ENT team at [facility] made aware - Dr. Stanford will see him in ED, currently not bleeding\par - f/up pending clinical course

## 2019-10-29 NOTE — ED PROVIDER NOTE - CLINICAL SUMMARY MEDICAL DECISION MAKING FREE TEXT BOX
**ATTENDING MEDICAL DECISION MAKING/SYNTHESIS (Dr. Rai Finch): I have reviewed the Chief Complaint, the HPI, the ROS, and have directly performed and confirmed the findings on the Physical Examination. I have reviewed the medical decision making with all providers, as applicable. The PROBLEM REPRESENTATION at this time is: 44-year-old man with extensive history of vasovagal syndrome, anxiety disorder, and recent meningioma resection (neurosurgery [facility], via transsphenoidal approach) s/p visit to ENT today (Marya) where anterior nasal packing with petrolatum gauze placed in office, complicated by two vasovagal episodes and near-syncope in office. To ED for evaluation. Had packing placed for epistaxis (presumed anterior) last PM and this AM. NO syncope at home. POSITIVE prodrome. NO chest pain, palpitations, shortness of breath, dyspnea on exertion, abdominal pain, or back pain. The MOST LIKELY DIAGNOSIS, and the LIST OF DIFFERENTIAL DIAGNOSES, includes (but is not limited to) the following: vasovagal syndrome, profound anemia or hemorrhagic shock (NO evidence), transient ischemic attack or cerebrovascular accident (NO evidence), arrhythmia (NO evidence of malignant arrhythmia or long QT syndrome), acute coronary syndrome e.g. NSTEMI, unstable angina, or equivalent, pulmonary embolism/deep venous thrombosis, pneumothorax, tamponade, Boerhaave's esophagus, acute surgical cause, serious bacterial infection or sepsis/severe sepsis e.g. pneumonia, empyema, or equivalent, perforation, peritonitis, dehydration, electrolyte-metabolic-endocrine derangements e.g. in association with surgery proximate to the pituitary gland (NO evidence), vascular cause e.g. AAA, dissection or equivalent, gastritis, gastroenteritis, pain syndrome, anxiety/panic reaction (NO evidence). The likelihood of each of these diagnoses has been appropriately considered in the context of this patient's presentation and my evaluation. PLAN: as described in EMR, including diagnostics, therapeutics and consultation as clinically warranted. I will continue to reevaluate the patient, including the results of all testing, and monitor response to therapy throughout the patient's course in the ED.

## 2019-10-29 NOTE — ED PROVIDER NOTE - PLAN OF CARE
**ATTENDING ADDENDUM (Dr. Rai Finch): Goals of care include resolution of emergent/urgent symptoms and concerns, and restoration to baseline level of homeostasis.

## 2019-10-29 NOTE — ED PROVIDER NOTE - NSFOLLOWUPINSTRUCTIONS_ED_ALL_ED_FT
Follow up with your PMD within 48-72 hrs. Show copies of your reports given to you. Take all of your medications as previously prescribed.   - Nasal saline, 2 sprays to both nares 4 times a day  - Avoid nasal trauma; no nose rubbing, blowing or manipulating nasal packing.  - - Sneeze with mouth open and pinching nares.  - Avoid bending with head blow the waist.    - No heavy lifting.  Return for any other concerns.

## 2019-10-29 NOTE — ED PROVIDER NOTE - EYES, MLM
Clear bilaterally, pupils equal, round and reactive to light. **ATTENDING ADDENDUM (Dr. Rai Finch): Extraocular muscle movements intact. Clear corneas bilaterally, pupils equal and round. NO nystagmus.

## 2019-10-29 NOTE — ED PROVIDER NOTE - PMH
Anxiety    Bulging lumbar disc  s/p SARITA  Chronic headaches  last 3 yrs  History of IBS    Marijuana use, continuous    Meningioma

## 2019-10-29 NOTE — ED PROVIDER NOTE - OBJECTIVE STATEMENT
44yom pmhx of anxiety, depression, hx of vasovagal syncope, IBS, recent transphenoidal resection of Pituitary meningoma 10/17 BIB EMS from ENT office s/p 2 syncopal episode. pt reports following up since the surgery with intermittent nose bleed, s/p posterior packing with syncope in the room then second episode while in the waiting room. No head trauma. No nausea or vomiting. No chest pain. No sob. 44yom pmhx of anxiety, depression, hx of vasovagal syncope, IBS, recent transphenoidal resection of Pituitary meningoma 10/17 BIB EMS from ENT office s/p 2 syncopal episode. pt reports following up since the surgery with intermittent nose bleed, s/p posterior packing with syncope in the room then second episode while in the waiting room. No head trauma. No nausea or vomiting. No chest pain. No sob.  **ATTENDING ADDENDUM (Dr. Rai Finch): I attest that I have directly examined this patient and elicited a comparable history of present illness and review of systems with my collaborating provider (NP/PA). I attest that I have made significant contributions to the documentation where necessary and as noted in the EMR. 44yom pmhx of anxiety, depression, hx of vasovagal syncope, IBS, recent transphenoidal resection of Pituitary meningoma 10/17 BIB EMS from ENT office s/p 2 syncopal episode. pt reports following up since the surgery with intermittent nose bleed, s/p posterior packing with syncope in the room then second episode while in the waiting room. No head trauma. No nausea or vomiting. No chest pain. No sob.  **ATTENDING ADDENDUM (Dr. Rai Finch): I attest that I have directly examined this patient and elicited a comparable history of present illness and review of systems with my collaborating provider (NP/PA). I attest that I have made significant contributions to the documentation where necessary and as noted in the EMR. Of note, and in addition to the above, the patient presents without packing in place at this time. Suspect patient had anterior packing in place (not posterior packing, as noted in HPI by PRIYA Levine above [patients with posterior packing usually remain under inpatient observation, not outpatient followup]). 44yom pmhx of anxiety, depression, hx of vasovagal syncope, IBS, recent transphenoidal resection of Pituitary meningoma 10/17 BIB EMS from ENT office s/p 2 syncopal episode. pt reports following up since the surgery with intermittent nose bleed, s/p posterior packing with syncope in the room then second episode while in the waiting room. No head trauma. No nausea or vomiting. No chest pain. No sob.  **ATTENDING ADDENDUM (Dr. Rai Finch): I attest that I have directly examined this patient and elicited a comparable history of present illness and review of systems with my collaborating provider (NP/PA). I attest that I have made significant contributions to the documentation where necessary and as noted in the EMR. Of note, and in addition to the above, the patient presents without packing in place at this time. Suspect patient had anterior packing in place (not posterior packing, as noted in HPI by PRIYA Levine above [patients with posterior packing usually remain under inpatient observation, not outpatient followup]). Had been experiencing epistaxis with bowel movements since procedure.

## 2019-10-29 NOTE — ED PROVIDER NOTE - CARE PLAN
Principal Discharge DX:	Vasovagal syndrome  Goal:	**ATTENDING ADDENDUM (Dr. Rai Finch): Goals of care include resolution of emergent/urgent symptoms and concerns, and restoration to baseline level of homeostasis.

## 2019-10-29 NOTE — ED PROVIDER NOTE - PROGRESS NOTE DETAILS
**ATTENDING ADDENDUM (Dr. Rai Finch): patient serially evaluated throughout ED course. NO acute deterioration up to this time in the ED. NO evidence of pathologic arrhythmia at this time. Screened for long QT syndrome or sudden cardiac death among family members (NO evidence). Case reviewed with ENT team. Will evaluate in ED. NO evidence of posterior epistaxis or persistent hemorrhage from an anterior epistaxis at this time. Extensive anticipatory guidance provided to patient +/or family member(s). Likely discharge home pending completion of ED workup, diagnostics, and reassessment following response to therapeutics. seen and evaluated by ENT. no tele events on monitor. ambulatory in the unit without any complaints. feels well. Will follow up with ENT and neuro sx as scheduled. seen and evaluated by ENT. no tele events on monitor. ambulatory in the unit without any complaints. feels well. Will follow up with ENT and neuro sx as scheduled.  **ATTENDING ADDENDUM (Dr. Rai Finch): patient serially evaluated throughout ED course. NO acute deterioration up to this time in the ED. seen and evaluated by ENT. no tele events on monitor. ambulatory in the unit without any complaints. feels well. Will follow up with ENT and neuro sx as scheduled.  **ATTENDING ADDENDUM (Dr. Rai Finch): patient serially evaluated throughout ED course. NO acute deterioration up to this time in the ED. Agree with above notation by PRIYA Levine. NO evidence of exsanguinating hemorrhage or malignant arrhythmia. NO current packing in place. Suspect vasavagal syndrome. Agree with discharge home with close outpatient followup with primary care physician/provider.

## 2019-10-29 NOTE — ED PROVIDER NOTE - PHYSICAL EXAMINATION
Gen: AAO x 3, pale looking male. NAD   Skin: No rashes or lesions  HEENT: NC/AT, PERRLA, EOMI, MMM  Resp: unlabored CTAB  Cardiac: rrr s1s2, no murmurs, rubs or gallops  GI: ND, +BS, Soft, NT  Ext: no pedal edema, FROM in all extremities  Neuro: no focal deficits Gen: AAO x 3, pale looking male. NAD   Skin: No rashes or lesions  HEENT: NC/AT, PERRLA, EOMI, MMM  Resp: unlabored CTAB  Cardiac: rrr s1s2, no murmurs, rubs or gallops  GI: ND, +BS, Soft, NT  Ext: no pedal edema, FROM in all extremities  Neuro: no focal deficits  **ATTENDING ADDENDUM (Dr. Rai Finch): I have reviewed and substantially contributed to the elements of the PE as documented above. I have directly performed an examination of this patient in conjunction with the other members (EM resident/PA/NP) of the patient care team. Of note, and in addition to the above, there is a left-sided anterior nasal pack in place (petrolatum gauze). NO evidence of anterior or posterior epistaxis at this time. NO ooze or clots. POSITIVE tattoos on the bilateral lower extremities.

## 2019-10-29 NOTE — ED PROVIDER NOTE - NS ED ROS FT
Constitutional: No fever or chills  Eyes: No visual changes, eye pain or redness  HEENT: No throat pain, ear pain, nasal pain. +nose bleeding.  CV: No chest pain or lower extremity edema +syncope   Resp: No SOB no cough  GI: No abd pain. No nausea or vomiting. No diarrhea. No constipation.   : No dysuria, hematuria.   MSK: No musculoskeletal pain  Skin: No rash  Neuro: No headache. No numbness or tingling. No weakness. Constitutional: No fever or chills  Eyes: No visual changes, eye pain or redness  HEENT: No throat pain, ear pain, nasal pain. +nose bleeding.  CV: No chest pain or lower extremity edema +syncope   Resp: No SOB no cough  GI: No abd pain. No nausea or vomiting. No diarrhea. No constipation.   : No dysuria, hematuria.   MSK: No musculoskeletal pain  Skin: No rash  Neuro: No headache. No numbness or tingling. No weakness.  **ATTENDING ADDENDUM (Dr. Rai Finch): During my interview with the patient, I have personally obtained and/or have directly verified the elements in the past medical/surgical history and other histories as noted earlier in the EMR,  in conjunction with the other members (EM resident/PA/NP) of the patient care team. I have also personally obtained and/or have directly verified/reviewed the review of systems as documented below, in conjunction with the other members (EM resident/PA/NP) of the patient care team. NOTE: s/p recent transphenoidal surgery and packing (likely anterior packing).

## 2019-10-29 NOTE — ED PROVIDER NOTE - PATIENT PORTAL LINK FT
You can access the FollowMyHealth Patient Portal offered by [facility] by registering at the following website: [email or web address]. By joining [employer]s FollowMyHealth portal, you will also be able to view your health information using other applications (apps) compatible with our system.

## 2019-10-29 NOTE — ED ADULT NURSE NOTE - NSIMPLEMENTINTERV_GEN_ALL_ED
Implemented All Universal Safety Interventions:  [place] to call system. Call bell, personal items and telephone within reach. Instruct patient to call for assistance. Room bathroom lighting operational. Non-slip footwear when patient is off stretcher. Physically safe environment: no spills, clutter or unnecessary equipment. Stretcher in lowest position, wheels locked, appropriate side rails in place.

## 2019-10-29 NOTE — ED PROVIDER NOTE - LAB INTERPRETATION
**ATTENDING ADDENDUM (Dr. Rai Finch): Labs reviewed. Pertinent findings include: NO severe anemia or profound electrolyte-metabolic-endocrine derangements.

## 2019-10-29 NOTE — CONSULT NOTE ADULT - SUBJECTIVE AND OBJECTIVE BOX
CC: Epistaxis    HPI: 44yom pmhx of anxiety, depression, hx of vasovagal syncope, IBS, recent transphenoidal resection of pituitary meningoma 10/17 BIB EMS from ENT office s/p 2 syncopal episode. Pt reports following up since the surgery with intermittent nose bleed, s/p left nasopore packing with syncope in the room then second episode while in the waiting room. Pt states that he has been experiencing the intermittent nose bleeds since having forceful bowel movement on 10/27. No head trauma. No nausea or vomiting. No chest pain. No sob. No salty taste in mouth or vision changes.     PAST MEDICAL & SURGICAL HISTORY:  Marijuana use, continuous  Meningioma  History of IBS  Chronic headaches: last 3 yrs  Bulging lumbar disc: s/p SARITA  Anxiety  S/P arthroscopic knee surgery: right knee 1993    Allergies    No Known Allergies    Intolerances    Cipro (Stomach Upset)  erythromycin (Stomach Upset)    MEDICATIONS  (STANDING):    MEDICATIONS  (PRN):      Social History: no pertinent social history    Family history: no pertinent family history    ROS:   ENT: all negative except as noted in HPI   CV: denies palpitations  Pulm: denies SOB, cough, hemoptysis  GI: denies change in appetite indigestion, n/v  : denies pertinent urinary symptoms, urgency  Neuro: denies numbness/tingling, loss of sensation  Psych: denies anxiety  MS: denies muscle weakness, instability  Heme: denies easy bruising or bleeding  Endo: denies heat/cold intolerance, excessive sweating  Vascular: denies LE edema    Vital Signs Last 24 Hrs  T(C): 36.7 (29 Oct 2019 10:22), Max: 36.7 (29 Oct 2019 10:08)  T(F): 98.1 (29 Oct 2019 10:22), Max: 98.1 (29 Oct 2019 10:22)  HR: 55 (29 Oct 2019 10:22) (55 - 60)  BP: 114/90 (29 Oct 2019 10:22) (114/90 - 117/77)  BP(mean): --  RR: 16 (29 Oct 2019 10:22) (16 - 16)  SpO2: 100% (29 Oct 2019 10:22) (100% - 100%)                          14.8   6.96  )-----------( 218      ( 29 Oct 2019 10:41 )             43.4    10-29    138  |  101  |  17  ----------------------------<  91  4.7   |  25  |  0.87    Ca    9.8      29 Oct 2019 10:41         PHYSICAL EXAM:  Gen: NAD  Skin: No rashes, bruises, or lesions  Head: Normocephalic, Atraumatic  Face: no edema, erythema, or fluctuance. Parotid glands soft without mass  Eyes: no scleral injection  Nose: Left nare: nasopore in place, minimal oozing from base of packing. Right nare: patent, no bleeding or discharge noted  Mouth: No Stridor / Drooling / Trismus.  Mucosa moist, tongue/uvula midline, oropharynx clear  Neck: Flat, supple, no lymphadenopathy, trachea midline, no masses  Lymphatic: No lymphadenopathy  Resp: breathing easily, no stridor  CV: no peripheral edema/cyanosis  GI: nondistended   Peripheral vascular: no JVD or edema  Neuro: facial nerve intact, no facial droop CC: Epistaxis    HPI: 44yom pmhx of hx of vasovagal syncope, IBS, s/p transphenoidal resection of pituitary meningoma on 10/17, BIB EMS from ENT office s/p 2 syncopal episodes. Pt reports intermittent nose bleed starting on 10/27 following a bowel movement. Pts left nare was packed in outpt office with nasopore when pt syncopized once in exam room and once in waiting room. No head trauma. No nausea or vomiting. No chest pain. No sob. No salty taste in mouth or vision changes.     PAST MEDICAL & SURGICAL HISTORY:  Marijuana use, continuous  Meningioma  History of IBS  Chronic headaches: last 3 yrs  Bulging lumbar disc: s/p SARITA  Anxiety  S/P arthroscopic knee surgery: right knee 1993    Allergies    No Known Allergies    Intolerances    Cipro (Stomach Upset)  erythromycin (Stomach Upset)    MEDICATIONS  (STANDING):    MEDICATIONS  (PRN):      Social History: no pertinent social history    Family history: no pertinent family history    ROS:   ENT: all negative except as noted in HPI   CV: denies palpitations  Pulm: denies SOB, cough, hemoptysis  GI: denies change in appetite indigestion, n/v  : denies pertinent urinary symptoms, urgency  Neuro: denies numbness/tingling, loss of sensation  Psych: denies anxiety  MS: denies muscle weakness, instability  Heme: denies easy bruising or bleeding  Endo: denies heat/cold intolerance, excessive sweating  Vascular: denies LE edema    Vital Signs Last 24 Hrs  T(C): 36.7 (29 Oct 2019 10:22), Max: 36.7 (29 Oct 2019 10:08)  T(F): 98.1 (29 Oct 2019 10:22), Max: 98.1 (29 Oct 2019 10:22)  HR: 55 (29 Oct 2019 10:22) (55 - 60)  BP: 114/90 (29 Oct 2019 10:22) (114/90 - 117/77)  BP(mean): --  RR: 16 (29 Oct 2019 10:22) (16 - 16)  SpO2: 100% (29 Oct 2019 10:22) (100% - 100%)                          14.8   6.96  )-----------( 218      ( 29 Oct 2019 10:41 )             43.4    10-29    138  |  101  |  17  ----------------------------<  91  4.7   |  25  |  0.87    Ca    9.8      29 Oct 2019 10:41         PHYSICAL EXAM:  Gen: NAD  Skin: No rashes, bruises, or lesions  Head: Normocephalic, Atraumatic  Face: no edema, erythema, or fluctuance. Parotid glands soft without mass  Eyes: no scleral injection  Nose: Left nare: nasopore in place, minimal oozing from base of packing. Right nare: patent, no bleeding or discharge noted  Mouth: No Stridor / Drooling / Trismus.  Mucosa moist, tongue/uvula midline, oropharynx clear  Neck: Flat, supple, no lymphadenopathy, trachea midline, no masses  Lymphatic: No lymphadenopathy  Resp: breathing easily, no stridor  CV: no peripheral edema/cyanosis  GI: nondistended   Peripheral vascular: no JVD or edema  Neuro: facial nerve intact, no facial droop

## 2019-10-29 NOTE — PHYSICAL EXAM
[Nasal Endoscopy Performed] : nasal endoscopy was performed, see procedure section for findings [Normal] : inferior turbinates and middle turbinates are normal [de-identified] : crusts and dried blood  [de-identified] : streaking of old blood in OP

## 2019-10-29 NOTE — PROCEDURE
[FreeTextEntry6] : reason for exam: anterior rhinoscopy insufficient for symptom evaluation \par \par Fiberoptic nasopharyngoscopy was performed.  R/b/a of procedure was explained to the patient and they agreed to proceed with procedure.  Nasal cavities were treated with afrin and lidocaine prior to nasal endoscopy to make the patient more comfortable.  left inferior turbinate was normal, left middle turbinate with synechium to the septum, blood clots seen above and below synechium, left superior turbinate was normal no boggy or erythematous mucosa.  OMC patent on left, superior, inferior and middle meati clear no evidence of purulence or sinusitis.  left sphenoethmoidal recess poorly visualized due to clots.  Septum deviated to the right.  Clots suctioned out - nasopore gently placed aboved and below synechium on the left after being treated with bacitracin.  NO active bleeding seen. \par scope #: R8

## 2019-10-29 NOTE — ED PROVIDER NOTE - CHIEF COMPLAINT
The patient is a 44y Male complaining of The patient is a 44y Male complaining of syncope while at primary care physician/provider's office in context of nasal packing.

## 2019-10-29 NOTE — CONSULT NOTE ADULT - ASSESSMENT
44M s/p transphenoidal resection of pituitary meningeoma on 10/17 presenting in the ED after 2 episodes of syncope in outpt ENT office s/p left nasopore packing. Pt has been experiencing epistaxis since having bowel movement on 10/27.

## 2019-10-29 NOTE — HISTORY OF PRESENT ILLNESS
[de-identified] : S/P TSPR on 10/17/19 with Dr. Stanford and Ingris.\par Now notes having bleeding with feeling of blood running down the back of his throat and spitting up large clots of blood.   Small amount of blood from the front of the nose.  Notes during bleeding gets some pressure over the left eye that resolves when bleeding stops.  No clear fluid.   Notes getting about 2-3 episodes a day

## 2019-10-29 NOTE — ED PROVIDER NOTE - ADDITIONAL RISK FACTOR FREE TEXT BOX
**ATTENDING ADDENDUM (Dr. Rai Finch): family member(s) present during patient's ED visit; corroborated CC, HPI and review of systems as provided by patient.

## 2019-10-29 NOTE — ED PROVIDER NOTE - ENMT, MLM
Airway patent. Mouth with normal mucosa. Throat has no vesicles, no oropharyngeal exudates and uvula is midline. **ATTENDING ADDENDUM (Dr. Rai Finch): POSITIVE left nare with packing as noted above. NO evidence of acute hemorrhage. NO clot noted. NO ooze. NO deformity.

## 2019-10-29 NOTE — ED PROVIDER NOTE - RESPIRATORY, MLM
Breath sounds clear and equal bilaterally. **ATTENDING ADDENDUM (Dr. Rai Finch): NO wheezing, rales, rhonchi, crackles, stridor, drooling, retractions, nasal flaring, or tripoding.

## 2019-10-29 NOTE — ED ADULT NURSE NOTE - OBJECTIVE STATEMENT
45 yo presents to the ED from PMD office by EMS s/p syncopal episode x 2. pt presents with mother at bedside. pt went to PMD office today for evaluation of intermittent epistaxis x 4 days. pituitary gland resection 10/17. history of vasovagal. reports feeling dizzy prior to syncope. no CP. no dizziness currently and no CP. EKG done. FS WNL. 18G inserted in LAC.

## 2019-10-29 NOTE — ED PROVIDER NOTE - ATTENDING CONTRIBUTION TO CARE
**ATTENDING ADDENDUM (Dr. Rai Finch): I attest that I have directly examined this patient and reviewed and formulated the diagnostic and therapeutic management plan in collaboration with the advanced practitioner (NP, PA). Please see MDM note and remainder of EMR for findings from CC, HPI, ROS, and PE. ([place])

## 2019-10-29 NOTE — END OF VISIT
[FreeTextEntry3] : I personally saw and examined MARISELA ELENA in detail.  I spoke to BOB Cooney regarding the assessment and plan of care. I performed the procedures and relevant physical exam.  I have reviewed the above assessment and plan of care and I agree.  I have made changes to the body of the note wherever necessary and appropriate.

## 2019-10-30 ENCOUNTER — MEDICATION RENEWAL (OUTPATIENT)
Age: 44
End: 2019-10-30

## 2019-10-31 PROCEDURE — 99261: CPT

## 2019-10-31 PROCEDURE — 93005 ELECTROCARDIOGRAM TRACING: CPT

## 2019-10-31 PROCEDURE — 86901 BLOOD TYPING SEROLOGIC RH(D): CPT

## 2019-10-31 PROCEDURE — 80048 BASIC METABOLIC PNL TOTAL CA: CPT

## 2019-10-31 PROCEDURE — 82962 GLUCOSE BLOOD TEST: CPT

## 2019-10-31 PROCEDURE — 84484 ASSAY OF TROPONIN QUANT: CPT

## 2019-10-31 PROCEDURE — C1889: CPT

## 2019-10-31 PROCEDURE — 84100 ASSAY OF PHOSPHORUS: CPT

## 2019-10-31 PROCEDURE — 86900 BLOOD TYPING SEROLOGIC ABO: CPT

## 2019-10-31 PROCEDURE — 83002 ASSAY OF GONADOTROPIN (LH): CPT

## 2019-10-31 PROCEDURE — 83003 ASSAY GROWTH HORMONE (HGH): CPT

## 2019-10-31 PROCEDURE — C1729: CPT

## 2019-10-31 PROCEDURE — 82550 ASSAY OF CK (CPK): CPT

## 2019-10-31 PROCEDURE — 84443 ASSAY THYROID STIM HORMONE: CPT

## 2019-10-31 PROCEDURE — 88342 IMHCHEM/IMCYTCHM 1ST ANTB: CPT

## 2019-10-31 PROCEDURE — 97162 PT EVAL MOD COMPLEX 30 MIN: CPT

## 2019-10-31 PROCEDURE — 82024 ASSAY OF ACTH: CPT

## 2019-10-31 PROCEDURE — 83735 ASSAY OF MAGNESIUM: CPT

## 2019-10-31 PROCEDURE — 82553 CREATINE MB FRACTION: CPT

## 2019-10-31 PROCEDURE — A9585: CPT

## 2019-10-31 PROCEDURE — 88307 TISSUE EXAM BY PATHOLOGIST: CPT

## 2019-10-31 PROCEDURE — 84146 ASSAY OF PROLACTIN: CPT

## 2019-10-31 PROCEDURE — C1769: CPT

## 2019-10-31 PROCEDURE — 82533 TOTAL CORTISOL: CPT

## 2019-10-31 PROCEDURE — 85027 COMPLETE CBC AUTOMATED: CPT

## 2019-10-31 PROCEDURE — 88360 TUMOR IMMUNOHISTOCHEM/MANUAL: CPT

## 2019-10-31 PROCEDURE — 70553 MRI BRAIN STEM W/O & W/DYE: CPT

## 2019-11-01 ENCOUNTER — APPOINTMENT (OUTPATIENT)
Dept: OTOLARYNGOLOGY | Facility: CLINIC | Age: 44
End: 2019-11-01
Payer: MEDICAID

## 2019-11-01 VITALS
SYSTOLIC BLOOD PRESSURE: 111 MMHG | WEIGHT: 142 LBS | DIASTOLIC BLOOD PRESSURE: 68 MMHG | HEART RATE: 71 BPM | BODY MASS INDEX: 22.29 KG/M2 | HEIGHT: 67 IN

## 2019-11-01 PROCEDURE — 31237 NSL/SINS NDSC SURG BX POLYPC: CPT | Mod: 58,LT

## 2019-11-01 PROCEDURE — 99024 POSTOP FOLLOW-UP VISIT: CPT

## 2019-11-01 NOTE — PROCEDURE
[FreeTextEntry6] : Afrin and lidocaine were topically sprayed. Flexible scope #2 was used. Right nasal passage with nasopore. Suctioned clear with rigid #11 scope. Exposed cartilage along right septum is mildly deviated to right. Nasal passage with mucoid secretions. Left nasal passage with nasopore that is bloody. There is a small adhesion between the septum and middle turbinate which is cut with tenotomy scissors and cauterized with silver nitrate. The nasopore was suctioned completely revealing some mild oozing along inferior sphenoidotomy. This was cauterized with silver nitrate. A small piece of gelfoam was laid over this area. No evidence of CSF leak. Nasal passage with bloody secretions that were suctioned clear. No active bleeding noted. Nasopharynx clear.

## 2019-11-01 NOTE — ASSESSMENT
[FreeTextEntry1] : s/p transpheoid resection of meningioma:\par - another self limited episode of epistaxis yesterday\par - left nasal adhesion lysed today and packing was suctioned today and cauterized area of oozing along inferior aspect of sphenoidotomy\par - saline spray for today and resume irrigations tomorrow if no further bleeding\par - hold off on neck PT until next visit with me

## 2019-11-01 NOTE — CONSULT LETTER
[Dear  ___] : Dear  [unfilled], [Courtesy Letter:] : I had the pleasure of seeing your patient, [unfilled], in my office today. [Please see my note below.] : Please see my note below. [Consult Closing:] : Thank you very much for allowing me to participate in the care of this patient.  If you have any questions, please do not hesitate to contact me. [Sincerely,] : Sincerely, [FreeTextEntry3] : Sury Stanford MD\par Otolaryngology and Cranial Base Surgery\par Attending Physician - Department of Otolaryngology and Head & Neck Surgery\par [facility]\par  - [employer] at [facility]\par Office: [phone]\par Fax: [phone]\par

## 2019-11-01 NOTE — PHYSICAL EXAM
[] : septum deviated to the right [Normal] : inferior turbinates and middle turbinates are normal [de-identified] : crusting bilaterally

## 2019-11-04 DIAGNOSIS — Z76.89 PERSONS ENCOUNTERING HEALTH SERVICES IN OTHER SPECIFIED CIRCUMSTANCES: ICD-10-CM

## 2019-11-06 ENCOUNTER — MEDICATION RENEWAL (OUTPATIENT)
Age: 44
End: 2019-11-06

## 2019-11-08 ENCOUNTER — APPOINTMENT (OUTPATIENT)
Dept: OTOLARYNGOLOGY | Facility: CLINIC | Age: 44
End: 2019-11-08
Payer: MEDICAID

## 2019-11-08 VITALS
WEIGHT: 147 LBS | HEIGHT: 67 IN | DIASTOLIC BLOOD PRESSURE: 69 MMHG | HEART RATE: 88 BPM | BODY MASS INDEX: 23.07 KG/M2 | SYSTOLIC BLOOD PRESSURE: 110 MMHG

## 2019-11-08 PROCEDURE — 99024 POSTOP FOLLOW-UP VISIT: CPT

## 2019-11-08 PROCEDURE — 31231 NASAL ENDOSCOPY DX: CPT | Mod: 58

## 2019-11-08 NOTE — ASSESSMENT
[FreeTextEntry1] : s/p transphenoid resection of meningioma:\par - no further bleeding noted\par - continue irrigations BID-TID\par - f/u 2-3 weeks

## 2019-11-08 NOTE — HISTORY OF PRESENT ILLNESS
[de-identified] : Going for TSPR with septoplasty with Dr. Amado 10/17/19.  \par Was seen 10/29/19 for bleeding, by Dr. Nolasco.  At that time Nasopore placed on left.  He had a vasovagal episode and sent to ED.  H&H 14/40.3 and no bleeding noted in the ED.\par Now notes no further bleeding.  Continues to have left sided headache and pressure.  Mild nasal congestion.  No clear fluid from nose.  No change in vision, following with Ophthalmology.  No sense of smell starting to regain sense of taste.

## 2019-11-08 NOTE — PROCEDURE
[FreeTextEntry6] : Afrin and lidocaine were topically sprayed. Flexible scope #2 was used. Right nasal passage clear.  Exposed cartilage along right septum is mildly deviated to right with crusting. Nasal passage with mucoid secretions. Left nasal passage clear. Resolved adhesion between the septum and middle turbinate. Septectomy is clear. Sphenoidotomy is patent with crusting coating it.  No evidence of CSF leak.

## 2019-11-08 NOTE — CONSULT LETTER
[Dear  ___] : Dear  [unfilled], [Courtesy Letter:] : I had the pleasure of seeing your patient, [unfilled], in my office today. [Please see my note below.] : Please see my note below. [Consult Closing:] : Thank you very much for allowing me to participate in the care of this patient.  If you have any questions, please do not hesitate to contact me. [Sincerely,] : Sincerely, [FreeTextEntry3] : Sury Stanford MD\par Otolaryngology and Cranial Base Surgery\par Attending Physician - Department of Otolaryngology and Head & Neck Surgery\par [facility]\par  - [employer] at [facility]\par Office: [phone]\par Fax: [phone]\par

## 2019-11-08 NOTE — PHYSICAL EXAM
[] : septum deviated to the right [Normal] : inferior turbinates and middle turbinates are normal [de-identified] : crusting bilaterally

## 2019-11-22 ENCOUNTER — APPOINTMENT (OUTPATIENT)
Dept: OTOLARYNGOLOGY | Facility: CLINIC | Age: 44
End: 2019-11-22
Payer: MEDICAID

## 2019-11-22 VITALS
HEIGHT: 67 IN | DIASTOLIC BLOOD PRESSURE: 60 MMHG | BODY MASS INDEX: 23.07 KG/M2 | WEIGHT: 147 LBS | HEART RATE: 59 BPM | SYSTOLIC BLOOD PRESSURE: 95 MMHG

## 2019-11-22 PROCEDURE — 31237 NSL/SINS NDSC SURG BX POLYPC: CPT | Mod: 58,RT

## 2019-11-22 PROCEDURE — 99024 POSTOP FOLLOW-UP VISIT: CPT

## 2019-11-22 NOTE — HISTORY OF PRESENT ILLNESS
[de-identified] : Going for TSPR with septoplasty with Dr. Amado 10/17/19.  \par Was seen 10/29/19 for bleeding, by Dr. Nolasco.  At that time Nasopore placed on left.  He had a vasovagal episode and sent to ED.  H&H 14/40.3 and no bleeding noted in the ED.\par Now notes no further bleeding.  Left sided headache and pressure is improving.  Mild nasal congestion.  No clear fluid from nose.  No change in vision, following with Ophthalmology.  Sense of smell and taste starting to return. \par Using Sinus wash TID.

## 2019-11-22 NOTE — PROCEDURE
[FreeTextEntry6] : Afrin and lidocaine were topically sprayed. Flexible scope #2 was used. Right nasal passage clear.  Exposed cartilage along right septum is mildly deviated to right with crusting. Rigid scope with #14 use to visualize and suction mucus and loos crusts off the right septum. Nasal passage with mucoid secretions. Left nasal passage clear. Resolved adhesion between the septum and middle turbinate. Septectomy is clear. Sphenoidotomy is patent with minimal crust. Flap well visualized and beautifully healed.  No evidence of CSF leak.

## 2019-11-22 NOTE — ASSESSMENT
[FreeTextEntry1] : s/p transphenoid resection of meningioma:\par - continue irrigations BID-TID\par - f/u 2-3 weeks to recheck right septum rehealing

## 2019-11-22 NOTE — CONSULT LETTER
[Dear  ___] : Dear  [unfilled], [Courtesy Letter:] : I had the pleasure of seeing your patient, [unfilled], in my office today. [Please see my note below.] : Please see my note below. [Consult Closing:] : Thank you very much for allowing me to participate in the care of this patient.  If you have any questions, please do not hesitate to contact me. [Sincerely,] : Sincerely, [FreeTextEntry3] : Sury Stanford MD\par Otolaryngology and Cranial Base Surgery\par Attending Physician - Department of Otolaryngology and Head & Neck Surgery\par [facility]\par  - [employer] at [facility]\par Office: [phone]\par Fax: [phone]\par

## 2019-11-22 NOTE — PHYSICAL EXAM
[] : septum deviated to the right [Normal] : inferior turbinates and middle turbinates are normal [de-identified] : crusting on right

## 2019-11-27 ENCOUNTER — APPOINTMENT (OUTPATIENT)
Dept: SPINE | Facility: CLINIC | Age: 44
End: 2019-11-27
Payer: MEDICAID

## 2019-11-27 VITALS
SYSTOLIC BLOOD PRESSURE: 99 MMHG | WEIGHT: 147 LBS | OXYGEN SATURATION: 95 % | BODY MASS INDEX: 23.07 KG/M2 | DIASTOLIC BLOOD PRESSURE: 63 MMHG | TEMPERATURE: 97.6 F | HEIGHT: 67 IN | HEART RATE: 82 BPM

## 2019-11-27 PROCEDURE — 99024 POSTOP FOLLOW-UP VISIT: CPT

## 2019-12-05 ENCOUNTER — MEDICATION RENEWAL (OUTPATIENT)
Age: 44
End: 2019-12-05

## 2019-12-06 ENCOUNTER — APPOINTMENT (OUTPATIENT)
Dept: OTOLARYNGOLOGY | Facility: CLINIC | Age: 44
End: 2019-12-06
Payer: MEDICAID

## 2019-12-06 ENCOUNTER — RX RENEWAL (OUTPATIENT)
Age: 44
End: 2019-12-06

## 2019-12-06 VITALS
DIASTOLIC BLOOD PRESSURE: 69 MMHG | WEIGHT: 147 LBS | SYSTOLIC BLOOD PRESSURE: 101 MMHG | HEIGHT: 67 IN | HEART RATE: 69 BPM | BODY MASS INDEX: 23.07 KG/M2

## 2019-12-06 PROCEDURE — 31231 NASAL ENDOSCOPY DX: CPT | Mod: 58

## 2019-12-06 PROCEDURE — 99024 POSTOP FOLLOW-UP VISIT: CPT

## 2019-12-10 NOTE — PHYSICAL EXAM
[] : septum deviated to the right [Normal] : inferior turbinates and middle turbinates are normal [de-identified] : crusting on right

## 2019-12-10 NOTE — PROCEDURE
[FreeTextEntry6] : Afrin and lidocaine were topically sprayed. Flexible scope #2 was used. Right nasal passage clear.  Exposed cartilage along right septum is mildly deviated to right with crusting. Nasal passage with mucoid secretions. Left nasal passage clear. Resolved adhesion between the septum and middle turbinate. Septectomy is clear. Sphenoidotomy is patent with minimal crust. Flap well visualized and beautifully healed.  No evidence of CSF leak.

## 2019-12-10 NOTE — HISTORY OF PRESENT ILLNESS
[de-identified] : Going for TSPR with septoplasty with Dr. Amado 10/17/19.  \par Was seen 10/29/19 for bleeding, by Dr. Nolasco.  At that time Nasopore placed on left.  He had a vasovagal episode and sent to ED.  H&H 14/40.3 and no bleeding noted in the ED.\par Now no bleeding.  Headaches are much improved.  Mild nasal congestion on right.  No clear fluid from nose.  No change in vision, following with Ophthalmology.  Sense of smell and taste starting to return. \par Using Sinus wash TID.

## 2019-12-10 NOTE — CONSULT LETTER
[Dear  ___] : Dear  [unfilled], [Courtesy Letter:] : I had the pleasure of seeing your patient, [unfilled], in my office today. [Consult Closing:] : Thank you very much for allowing me to participate in the care of this patient.  If you have any questions, please do not hesitate to contact me. [Please see my note below.] : Please see my note below. [Sincerely,] : Sincerely, [FreeTextEntry3] : Sury Stanford MD\par Otolaryngology and Cranial Base Surgery\par Attending Physician - Department of Otolaryngology and Head & Neck Surgery\par [facility]\par  - [employer] at [facility]\par Office: [phone]\par Fax: [phone]\par

## 2019-12-13 ENCOUNTER — MEDICATION RENEWAL (OUTPATIENT)
Age: 44
End: 2019-12-13

## 2019-12-27 ENCOUNTER — APPOINTMENT (OUTPATIENT)
Dept: OTOLARYNGOLOGY | Facility: CLINIC | Age: 44
End: 2019-12-27

## 2019-12-31 ENCOUNTER — RX RENEWAL (OUTPATIENT)
Age: 44
End: 2019-12-31

## 2020-01-23 ENCOUNTER — APPOINTMENT (OUTPATIENT)
Dept: MRI IMAGING | Facility: CLINIC | Age: 45
End: 2020-01-23
Payer: MEDICAID

## 2020-01-23 ENCOUNTER — OUTPATIENT (OUTPATIENT)
Dept: OUTPATIENT SERVICES | Facility: HOSPITAL | Age: 45
LOS: 1 days | End: 2020-01-23
Payer: MEDICAID

## 2020-01-23 DIAGNOSIS — Z98.890 OTHER SPECIFIED POSTPROCEDURAL STATES: Chronic | ICD-10-CM

## 2020-01-23 DIAGNOSIS — D32.9 BENIGN NEOPLASM OF MENINGES, UNSPECIFIED: ICD-10-CM

## 2020-01-23 PROCEDURE — 70553 MRI BRAIN STEM W/O & W/DYE: CPT

## 2020-01-23 PROCEDURE — 70553 MRI BRAIN STEM W/O & W/DYE: CPT | Mod: 26

## 2020-01-23 PROCEDURE — A9585: CPT

## 2020-01-24 ENCOUNTER — APPOINTMENT (OUTPATIENT)
Dept: OTOLARYNGOLOGY | Facility: CLINIC | Age: 45
End: 2020-01-24
Payer: MEDICAID

## 2020-01-24 VITALS
HEART RATE: 70 BPM | BODY MASS INDEX: 23.07 KG/M2 | WEIGHT: 147 LBS | DIASTOLIC BLOOD PRESSURE: 65 MMHG | SYSTOLIC BLOOD PRESSURE: 102 MMHG | HEIGHT: 67 IN

## 2020-01-24 DIAGNOSIS — R04.0 EPISTAXIS: ICD-10-CM

## 2020-01-24 PROCEDURE — 31237 NSL/SINS NDSC SURG BX POLYPC: CPT | Mod: 58,RT

## 2020-01-24 PROCEDURE — 99024 POSTOP FOLLOW-UP VISIT: CPT

## 2020-01-24 NOTE — PROCEDURE
[FreeTextEntry6] : Afrin and lidocaine were topically sprayed. Flexible scope #2 was used. Right nasal passage with crusting. This was debrided gently. Exposed cartilage along right anterior septum is gently debrided. Mucosalizing in circumferentially. Nasal passage with mucoid secretions. Left nasal passage clear. Resolved adhesion between the septum and middle turbinate. Septectomy is clear. Sphenoidotomy is patent with no crusting. Flap well visualized and beautifully healed.  No evidence of CSF leak.

## 2020-01-24 NOTE — ASSESSMENT
[FreeTextEntry1] : s/p transphenoid resection of meningioma:\par - continue irrigations BID-TID\par - f/u 1 month to recheck right septum and ensure healed

## 2020-01-24 NOTE — PHYSICAL EXAM
[] : septum deviated to the right [Normal] : inferior turbinates and middle turbinates are normal [de-identified] : crusting on right

## 2020-01-24 NOTE — HISTORY OF PRESENT ILLNESS
[de-identified] : Going for TSPR with septoplasty with Dr. Amado 10/17/19.  \par Was seen 10/29/19 for bleeding, by Dr. Nolasco.  At that time Nasopore placed on left.  He had a vasovagal episode and sent to ED.  H&H 14/40.3 and no bleeding noted in the ED.\par Now no bleeding active bleeding, however notes occasional blood seen on a tissue.  No nasal congestion.  No clear d/c.  Sense of smell and taste are returning, not back to normal.  Vision is good.  Using sinus wash BID.

## 2020-01-24 NOTE — CONSULT LETTER
[Dear  ___] : Dear  [unfilled], [Please see my note below.] : Please see my note below. [Courtesy Letter:] : I had the pleasure of seeing your patient, [unfilled], in my office today. [Consult Closing:] : Thank you very much for allowing me to participate in the care of this patient.  If you have any questions, please do not hesitate to contact me. [Sincerely,] : Sincerely, [FreeTextEntry3] : Sury Stanford MD\par Otolaryngology and Cranial Base Surgery\par Attending Physician - Department of Otolaryngology and Head & Neck Surgery\par [facility]\par  - [employer] at [facility]\par Office: [phone]\par Fax: [phone]\par

## 2020-01-27 ENCOUNTER — TRANSCRIPTION ENCOUNTER (OUTPATIENT)
Age: 45
End: 2020-01-27

## 2020-01-27 ENCOUNTER — OTHER (OUTPATIENT)
Age: 45
End: 2020-01-27

## 2020-01-29 ENCOUNTER — APPOINTMENT (OUTPATIENT)
Dept: SPINE | Facility: CLINIC | Age: 45
End: 2020-01-29
Payer: MEDICAID

## 2020-01-29 VITALS
BODY MASS INDEX: 23.07 KG/M2 | HEIGHT: 67 IN | TEMPERATURE: 98.1 F | WEIGHT: 147 LBS | HEART RATE: 72 BPM | SYSTOLIC BLOOD PRESSURE: 100 MMHG | OXYGEN SATURATION: 98 % | DIASTOLIC BLOOD PRESSURE: 69 MMHG

## 2020-01-29 PROCEDURE — 99213 OFFICE O/P EST LOW 20 MIN: CPT

## 2020-01-29 RX ORDER — ACETAMINOPHEN AND CODEINE 300; 30 MG/1; MG/1
300-30 TABLET ORAL EVERY 6 HOURS
Qty: 28 | Refills: 0 | Status: DISCONTINUED | COMMUNITY
Start: 2019-10-25 | End: 2020-01-29

## 2020-02-10 ENCOUNTER — APPOINTMENT (OUTPATIENT)
Dept: ORTHOPEDIC SURGERY | Facility: CLINIC | Age: 45
End: 2020-02-10
Payer: MEDICAID

## 2020-02-10 VITALS
BODY MASS INDEX: 23.07 KG/M2 | SYSTOLIC BLOOD PRESSURE: 112 MMHG | HEART RATE: 77 BPM | WEIGHT: 147 LBS | HEIGHT: 67 IN | DIASTOLIC BLOOD PRESSURE: 65 MMHG

## 2020-02-10 PROCEDURE — 73130 X-RAY EXAM OF HAND: CPT | Mod: RT

## 2020-02-10 PROCEDURE — 99203 OFFICE O/P NEW LOW 30 MIN: CPT | Mod: 25

## 2020-02-10 PROCEDURE — 20550 NJX 1 TENDON SHEATH/LIGAMENT: CPT | Mod: 59,RT

## 2020-02-27 ENCOUNTER — APPOINTMENT (OUTPATIENT)
Dept: ENDOCRINOLOGY | Facility: CLINIC | Age: 45
End: 2020-02-27
Payer: MEDICAID

## 2020-02-27 VITALS
SYSTOLIC BLOOD PRESSURE: 111 MMHG | DIASTOLIC BLOOD PRESSURE: 68 MMHG | BODY MASS INDEX: 24.75 KG/M2 | WEIGHT: 158 LBS | OXYGEN SATURATION: 98 % | HEART RATE: 67 BPM

## 2020-02-27 PROCEDURE — 36415 COLL VENOUS BLD VENIPUNCTURE: CPT

## 2020-02-27 PROCEDURE — 99205 OFFICE O/P NEW HI 60 MIN: CPT | Mod: 25

## 2020-02-27 NOTE — PHYSICAL EXAM
[Alert] : alert [No Acute Distress] : no acute distress [Well Nourished] : well nourished [Well Developed] : well developed [Normal Sclera/Conjunctiva] : normal sclera/conjunctiva [EOMI] : extra ocular movement intact [No Proptosis] : no proptosis [Thyroid Not Enlarged] : the thyroid was not enlarged [Normal Oropharynx] : the oropharynx was normal [No Thyroid Nodules] : there were no palpable thyroid nodules [No Respiratory Distress] : no respiratory distress [No Accessory Muscle Use] : no accessory muscle use [Clear to Auscultation] : lungs were clear to auscultation bilaterally [Normal S1, S2] : normal S1 and S2 [Normal Rate] : heart rate was normal  [Regular Rhythm] : with a regular rhythm [No Edema] : there was no peripheral edema [Soft] : abdomen soft [Normal Bowel Sounds] : normal bowel sounds [Not Tender] : non-tender [Not Distended] : not distended [Post Cervical Nodes] : posterior cervical nodes [Anterior Cervical Nodes] : anterior cervical nodes [Normal] : normal and non tender [No Spinal Tenderness] : no spinal tenderness [Spine Straight] : spine straight [No Stigmata of Cushings Syndrome] : no stigmata of cushings syndrome [Normal Gait] : normal gait [Normal Strength/Tone] : muscle strength and tone were normal [No Rash] : no rash [Normal Reflexes] : deep tendon reflexes were 2+ and symmetric [Oriented x3] : oriented to person, place, and time [No Tremors] : no tremors [Healthy Appearance] : healthy appearance [Normal Hearing] : hearing was normal [Normal Outer Ear/Nose] : the ears and nose were normal in appearance [Hirsutism] : no hirsutism [Acanthosis Nigricans] : no acanthosis nigricans [Normal Affect] : the affect was normal [Normal Insight/Judgement] : insight and judgment were intact [Normal Mood] : the mood was normal

## 2020-02-27 NOTE — HISTORY OF PRESENT ILLNESS
[FreeTextEntry1] : 43 y/o male here for multiple symptoms.\par \par He had TSR on 10/17/19 for meningioma and nasal septum deviation. Even prior to surgery, symptoms included of fatigue, headaches, lack of appetite, weight loss, profuse sweating (day or night), tremors. After surgery, states headaches have improved but continues to have photophobia. Admits to persistent sweating (even while asleep). Admits to regaining some weight. Also has IBS and takes Librax for it. Admits to generalized myalgias ("states for years"). No breast discharge. Admits to staying hydrated. Admits to palpitations (anxiety or heart related, so saw cardiologist and had a holter monitor that showed PVC). Also has vasovagal syncope. No galactorrhea. Admits to morning erections.\par \par Takes Xanax 1 mg QD, usually takes at bedtime. Started taking Xanax 1 year. Also takes Ambien 10 mg QHS (has been taking intermittently). No other current medications. Admits to getting steroid wrist injections about 2-3 weeks ago for tendonitis. Also takes Zoloft 100 mg QD. \par \par FH: No history of endocrine related problems.\par Soc HX: smokes marijuana (depends on the day). Drinks alcohol once/month. Quit tobacco 3.5 years ago. Was 1 ppd x 25 years.\par PSh: knee arthroscopy, meningioma

## 2020-02-27 NOTE — ASSESSMENT
[FreeTextEntry1] : 43 y/o male with history of anxiety and panic attacks here for evaluation of fatigue and persistent diaphoresis. He also has history of TSR for meningioma\par \par Fatigue and persistent diaphoresis\par - Symptoms are myriad and vague and non-specific. Likely related to anxiety and panic attacks\par - Would recommend r/o potential rare endocrine problems such as pheochromocytoma or paraganglioma given persistent profuse diaphoresis. He states he has low BP which is unusual for pheo/ppgl since most patients have episodic hypertension\par - Recommend check plasma metanephrines, catecholamines. If negative it r/o these rare conditions. If positive, could be false positive in the setting of taking ambien, xanax, marijuana, and zoloft. If positive, will need 24 hour urine testing and/or adrenal imaging\par - Patient admits to morning erections and improvement in headaches so I do not suspect hypogonadism.\par - I will also r/o thyroid abnormality, check TFTs. \par - Reviewed lab results from 10/2019 hospital stay. \par \par F/u in 4 months\par If additional w/u is needed, I will notify patient via phone.\par \par Sylwia Negron, \par \par  [Sick-Day Management] : sick-day management

## 2020-02-27 NOTE — REVIEW OF SYSTEMS
[Fatigue] : fatigue [Palpitations] : palpitations [Constipation] : constipation [Diarrhea] : diarrhea [Abdominal Pain] : abdominal pain [Gas/Bloating] : gas/bloating [Myalgia] : myalgia  [Dizziness] : dizziness [Tremors] : tremors [Anxiety] : anxiety [Depression] : depression [Insomnia] : insomnia [Heat Intolerance] : heat intolerant [Hot Flashes] : hot flashes [All other systems negative] : All other systems negative [Recent Weight Gain (___ Lbs)] : no recent weight gain [Recent Weight Loss (___ Lbs)] : no recent weight loss [Visual Field Defect] : no visual field defect [Blurry Vision] : no blurred vision [Dry Eyes] : no dryness of the eyes [Eye Pain] : no eye pain [Eyes Itch] : no itching of the eyes [Redness] : no redness  [Dysphagia] : no dysphagia [Dysphonia] : no dysphonia [Neck Pain] : no neck pain [Chest Pain] : no chest pain [Shortness Of Breath] : no shortness of breath [Wheezing] : no wheezing was heard [Cough] : no cough [SOB on Exertion] : no shortness of breath during exertion [Hesitancy] : no hesitancy [Polyuria] : no polyuria [Incontinence] : no incontinence [Back Pain] : no back pain [Acanthosis] : no acanthosis  [Acne] : no acne [Hair Loss] : no hair loss [Dry Skin] : no dry skin [Headache] : no headaches [Polydipsia] : no polydipsia [Easy Bleeding] : no ~M tendency for easy bleeding [Swelling] : no swelling [Lymphadenopathy] : no lymphadenopathy

## 2020-02-28 ENCOUNTER — APPOINTMENT (OUTPATIENT)
Dept: OTOLARYNGOLOGY | Facility: CLINIC | Age: 45
End: 2020-02-28
Payer: MEDICAID

## 2020-02-28 VITALS
HEART RATE: 70 BPM | WEIGHT: 158 LBS | DIASTOLIC BLOOD PRESSURE: 71 MMHG | HEIGHT: 67 IN | BODY MASS INDEX: 24.8 KG/M2 | SYSTOLIC BLOOD PRESSURE: 107 MMHG

## 2020-02-28 DIAGNOSIS — R09.81 NASAL CONGESTION: ICD-10-CM

## 2020-02-28 PROCEDURE — 31231 NASAL ENDOSCOPY DX: CPT

## 2020-02-28 PROCEDURE — 99214 OFFICE O/P EST MOD 30 MIN: CPT | Mod: 25

## 2020-02-28 NOTE — PROCEDURE
[FreeTextEntry6] : Afrin and lidocaine were topically sprayed. Flexible scope #2 was used. Right nasal passage clear with well healed mucosa. No exposed septal cartilage noted. Nasal passage with mucoid secretions. Left nasal passage clear. Resolved adhesion between the septum and middle turbinate. Septectomy is clear. Sphenoidotomy is patent with no crusting. Flap well visualized and beautifully healed.  No evidence of CSF leak.

## 2020-02-28 NOTE — ASSESSMENT
[FreeTextEntry1] : s/p transphenoid resection of meningioma:\par - continue irrigations prn\par - well healed\par \par TMJ tenderness:\par - recommend f/u with dental to obtain dental guard\par \par f/u PRN

## 2020-02-28 NOTE — PHYSICAL EXAM
[] : septum deviated to the right [Normal] : tympanic membranes are normal in both ears [de-identified] : Pos tenderness and crepitus over b/l TMJ with opening and closing.  [de-identified] : crusting on right

## 2020-02-28 NOTE — CONSULT LETTER
[Dear  ___] : Dear  [unfilled], [Courtesy Letter:] : I had the pleasure of seeing your patient, [unfilled], in my office today. [Consult Closing:] : Thank you very much for allowing me to participate in the care of this patient.  If you have any questions, please do not hesitate to contact me. [Please see my note below.] : Please see my note below. [Sincerely,] : Sincerely, [FreeTextEntry3] : Sury Stanford MD\par Otolaryngology and Cranial Base Surgery\par Attending Physician - Department of Otolaryngology and Head & Neck Surgery\par [facility]\par  - [employer] [facility]\par Office: [phone]\par Fax: [phone]\par

## 2020-02-28 NOTE — HISTORY OF PRESENT ILLNESS
[de-identified] : Going for TSPR with septoplasty with Dr. Amado 10/17/19.  Was seen 10/29/19 for bleeding, by Dr. Nolasco.  \par Now, now nasal congestion.  Occasionally notes mild pressure b/t eyes.  Using sinus wash, notes some blood in wash on occasion, however no active bleeding. \par Also notes a few months of b/l ear discomfort with grinding sound when moving jaw.  Worse with eating.  he notes he grinds his teeth.  No change in hearing, no tinnitus, no Vertigo, no d/c. His dentist is currently in process of getting him a dental .

## 2020-03-12 LAB
ALBUMIN SERPL ELPH-MCNC: 4.8 G/DL
ALP BLD-CCNC: 90 U/L
ALT SERPL-CCNC: 19 U/L
ANION GAP SERPL CALC-SCNC: 15 MMOL/L
AST SERPL-CCNC: 23 U/L
BILIRUB SERPL-MCNC: 0.2 MG/DL
BUN SERPL-MCNC: 14 MG/DL
CALCIUM SERPL-MCNC: 10.3 MG/DL
CHLORIDE SERPL-SCNC: 100 MMOL/L
CO2 SERPL-SCNC: 24 MMOL/L
CREAT SERPL-MCNC: 0.91 MG/DL
DHEA-SULFATE, SERUM: 38 UG/DL
GLUCOSE SERPL-MCNC: 91 MG/DL
METANEPHRINE, PL: 27 PG/ML
NORMETANEPHRINE, PL: 158 PG/ML
POTASSIUM SERPL-SCNC: 4.6 MMOL/L
PROT SERPL-MCNC: 8 G/DL
SODIUM SERPL-SCNC: 139 MMOL/L
T4 FREE SERPL-MCNC: 1 NG/DL
TSH SERPL-ACNC: 1.6 UIU/ML

## 2020-03-23 ENCOUNTER — RX RENEWAL (OUTPATIENT)
Age: 45
End: 2020-03-23

## 2020-04-10 ENCOUNTER — OUTPATIENT (OUTPATIENT)
Dept: OUTPATIENT SERVICES | Facility: HOSPITAL | Age: 45
LOS: 1 days | End: 2020-04-10
Payer: MEDICAID

## 2020-04-10 ENCOUNTER — APPOINTMENT (OUTPATIENT)
Dept: RADIOLOGY | Facility: HOSPITAL | Age: 45
End: 2020-04-10
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
## 2020-04-10 DIAGNOSIS — Z00.8 ENCOUNTER FOR OTHER GENERAL EXAMINATION: ICD-10-CM

## 2020-04-10 DIAGNOSIS — Z98.890 OTHER SPECIFIED POSTPROCEDURAL STATES: Chronic | ICD-10-CM
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
## 2020-04-10 DIAGNOSIS — M25.511 PAIN IN RIGHT SHOULDER: ICD-10-CM
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
## 2020-04-10 PROCEDURE — 73030 X-RAY EXAM OF SHOULDER: CPT | Mod: 26,RT

## 2020-04-10 PROCEDURE — 73030 X-RAY EXAM OF SHOULDER: CPT

## 2020-04-23 ENCOUNTER — RX RENEWAL (OUTPATIENT)
Age: 45
End: 2020-04-23

## 2020-05-01 PROCEDURE — G9005: CPT

## 2020-05-18 ENCOUNTER — RX RENEWAL (OUTPATIENT)
Age: 45
End: 2020-05-18

## 2020-05-21 ENCOUNTER — APPOINTMENT (OUTPATIENT)
Dept: ORTHOPEDIC SURGERY | Facility: CLINIC | Age: 45
End: 2020-05-21

## 2020-05-22 ENCOUNTER — APPOINTMENT (OUTPATIENT)
Dept: GASTROENTEROLOGY | Facility: CLINIC | Age: 45
End: 2020-05-22

## 2020-05-26 ENCOUNTER — RX RENEWAL (OUTPATIENT)
Age: 45
End: 2020-05-26

## 2020-05-27 ENCOUNTER — RX RENEWAL (OUTPATIENT)
Age: 45
End: 2020-05-27

## 2020-06-04 NOTE — DISCHARGE NOTE NURSING/CASE MANAGEMENT/SOCIAL WORK - NSFLUVACAGEDISCH_IMM_ALL_CORE
Initial Sprycel consult completed on . Sprycel will be shipped on  to arrive at patient's home on  via [employer]. $0.00 copay. Patient plans to start Sprycel on . Address confirmed. Confirmed 2 patient identifiers - name and . Therapy Appropriate.      Patient was counseled on the administration directions:  -Take 1 tablet (100mg) by mouth once daily, with or without food.  -Do not chew, crush, or break the tablets. If possible, patient was instructed tip the tablets from the RX bottle to the cap, and take directly from the cap to the mouth. Patient may handle the medication with their hands if they wear with a latex or nitrile glove and wash their hands before and after handling the tablets.     Patient was counseled on the following possible side effects, which include, but are not limited to: swelling, fatigue, weakness/dizziness, skin irritation, diarrhea,  constipation, nausea, vomiting, loss of appetite. increased risk for infection, shortness of breath, and increased risk of bleeding.   Patient was given hydrocortisone cream for dermatitis.      DDIs:  Medication list reviewed and potential DDIs addressed.   -Sprycel + Fioricet (Cat D) - APAP may enhance the hepatotoxic effects of Sprycel.  -Sprycel + Fluconazole (Cat C) - fluconazole may decrease the metabolism of Sprycel - monitor for increased side effects of Sprycel. Combination may also increase the QTc interval - last EKG 3/20/2020 QTc 430 msec.  -Sprycel + Duloxetine (Cat C) - Sprycel may enhance the anticoagulant effects of duloxetine. Monitor labs.     Patient was given 2 patient education handouts on how to handle oral chemotherapy and specific recommendations- do's and don'ts. Instructed the patient that if they have any remaining oral chemotherapy, not to flush down the toilet or throw away in the trash; The patient or caregiver should return the unused oral chemotherapy to either the clinic or to myself in the Pharmacy where the oral  chemotherapy can be disposed of properly.      Patient confirmed understanding. Patient did not have additional questions. Consultation included: indication; goals of treatment; administration; storage and handling; side effects; how to handle side effects; the importance of compliance; how to handle missed doses; the importance of laboratory monitoring; the importance of keeping all follow up appointments. Patient understands to report any medication changes to [facility] and provider. All questions answered and addressed to patients satisfaction. I will f/u with her in 1 week from start, [facility] to contact patient in 3 weeks for refills.           Adult

## 2020-06-18 ENCOUNTER — APPOINTMENT (OUTPATIENT)
Dept: ENDOCRINOLOGY | Facility: CLINIC | Age: 45
End: 2020-06-18

## 2020-06-21 ENCOUNTER — EMERGENCY (EMERGENCY)
Facility: HOSPITAL | Age: 45
LOS: 1 days | Discharge: HOME | End: 2020-06-21
Attending: EMERGENCY MEDICINE | Admitting: EMERGENCY MEDICINE
Payer: MEDICAID

## 2020-06-21 VITALS
SYSTOLIC BLOOD PRESSURE: 97 MMHG | HEIGHT: 68 IN | TEMPERATURE: 98 F | RESPIRATION RATE: 19 BRPM | HEART RATE: 78 BPM | DIASTOLIC BLOOD PRESSURE: 56 MMHG | OXYGEN SATURATION: 95 % | WEIGHT: 167.99 LBS

## 2020-06-21 VITALS — DIASTOLIC BLOOD PRESSURE: 66 MMHG | SYSTOLIC BLOOD PRESSURE: 102 MMHG

## 2020-06-21 DIAGNOSIS — Z98.890 OTHER SPECIFIED POSTPROCEDURAL STATES: Chronic | ICD-10-CM

## 2020-06-21 PROCEDURE — 99284 EMERGENCY DEPT VISIT MOD MDM: CPT

## 2020-06-21 PROCEDURE — 99283 EMERGENCY DEPT VISIT LOW MDM: CPT

## 2020-06-21 RX ORDER — CEPHALEXIN 500 MG
500 CAPSULE ORAL ONCE
Refills: 0 | Status: COMPLETED | OUTPATIENT
Start: 2020-06-21 | End: 2020-06-21

## 2020-06-21 RX ORDER — CEPHALEXIN 500 MG
1 CAPSULE ORAL
Qty: 28 | Refills: 0
Start: 2020-06-21 | End: 2020-06-27

## 2020-06-21 RX ORDER — DIPHENHYDRAMINE HCL 50 MG
1 CAPSULE ORAL
Qty: 12 | Refills: 0
Start: 2020-06-21 | End: 2020-06-23

## 2020-06-21 RX ORDER — FLUTICASONE PROPIONATE 0.5 MG/G
1 CREAM TOPICAL
Qty: 15 | Refills: 0
Start: 2020-06-21 | End: 2020-06-23

## 2020-06-21 RX ADMIN — Medication 500 MILLIGRAM(S): at 21:30

## 2020-06-21 NOTE — ED PROVIDER NOTE - ATTENDING CONTRIBUTION TO CARE
pt c/o insect bite to L hand yesterday, with gradual increased swelling, also c mild pain and moderate itching.  no fever/chills, headache, sob.  pt thinks he got stung by a wasp or bee (saw it flying around, did not see actual bite).    exam:   General: well appearing, NAD.   HEENT: eyes perrl,   cor: RRR, s1s2, 2+rad pulses.   lungs: ctabl, no resp distress.   neuro: a&ox3, cn2-12 intact, MCCORD, 5/5 strength c nl sensation all extremities, nl coordination.   MSK: L hand: mild to moderate generalized dorsal swelling extending to proximal digits with slight diffuse ttp, mild increased warmth.  nontender digits. FROM digits and wrist s pain. normal cap refill.  nontender palm  Skin: see above    AP: L hand insect sting with redness, swelling pain, itch.  local allergic reaction with possible early cellulitis.  no signs of tenosynovitis.  will treat c topical steroid, keflex, benadryl, ice/elevation. f/u pmd/ hand.  pt has appt with hand specialist tomorrow for separate matter, told pt to have hand surgeon evaluate L hand tomorrow

## 2020-06-21 NOTE — ED ADULT NURSE NOTE - OBJECTIVE STATEMENT
Patient presents to ED complaining of redness and swelling of the left hand and wrist s/p insect bite yesterday. Patient states that he believes a wasp stung him on the left hand. He felt a burning sensation right after the event and then woke up the next morning with swelling, redness, and itching. Patient states that the swelling began to spread to his wrist a few hours PTA in the ED. Patient took benadryl 2 hours PTA. Patient denies pain but feels tightness when attempting to flex the hand or wrist. Patient denies fever, dizziness, N/V/D, or cough.

## 2020-06-21 NOTE — ED PROVIDER NOTE - CLINICAL SUMMARY MEDICAL DECISION MAKING FREE TEXT BOX
Pt is 46 y/o male with PMHx of anxiety, IBS here for eval s/p insect bite to Lt hand. Pt states that while in his backyard yesterday he was "bitten by something" to dorsal aspect of the hand, today am pt woke up with erythema, edema and itching to entire hand, took 25 mg of benadryl as well as applied cortizone cream to the hand w/o relief. There is no fever, chills reported, pt denies ext weakness or numbness, denies erythematous streaking. no additional insect bites; denies hiking hx or tick bites, joint pain;Pt with cellulitis vs allergic rxn to inset bite to dorsum of Lt hand, well appearing, afberile, immunocompetent, no streaking or collection, no tenosynovitis noted - will start on keflex, benadryl prn and topical steroid, pcp f/u.

## 2020-06-21 NOTE — ED PROVIDER NOTE - PHYSICAL EXAMINATION
Lt hand -  edema and erythema to dorsum of hand,  with FROM of  all digits, no evidence of tenosynovitis, good , 2+ radial pulse, NVI;  NO streaking, no collection noted;

## 2020-06-21 NOTE — ED PROVIDER NOTE - NSFOLLOWUPINSTRUCTIONS_ED_ALL_ED_FT
PLEAS TAKE BENADRYL 50MG EVERY 6-8HRS AS NEEDED, TAKE ANTIBIOTICS AS PRESCRIBED, FOLLOW UP WITH YOUR DOCTOR WITHIN 48HRS. RETURN TO ER FOR WORSENING SYMPTOMS;   Cellulitis, Adult     Cellulitis is a skin infection. The infected area is often warm, red, swollen, and sore. It occurs most often in the arms and lower legs. It is very important to get treated for this condition.  What are the causes?  This condition is caused by bacteria. The bacteria enter through a break in the skin, such as a cut, burn, insect bite, open sore, or crack.  What increases the risk?  This condition is more likely to occur in people who:  Have a weak body defense system (immune system). Have open cuts, burns, bites, or scrapes on the skin.Are older than 60 years of age.Have a blood sugar problem (diabetes). Have a long-lasting (chronic) liver disease (cirrhosis) or kidney disease.Are very overweight (obese).Have a skin problem, such as:  Itchy rash (eczema).Slow movement of blood in the veins (venous stasis).Fluid buildup below the skin (edema).Have been treated with high-energy rays (radiation).Use IV drugs. What are the signs or symptoms?  Symptoms of this condition include:  Skin that is:  Red.Streaking.Spotting.Swollen.Sore or painful when you touch it.Warm.A fever.Chills. Blisters.How is this diagnosed?  This condition is diagnosed based on:  Medical history.Physical exam.Blood tests.Imaging tests.How is this treated?  Treatment for this condition may include:  Medicines to treat infections or allergies.Home care, such as:  Rest.Placing cold or warm cloths (compresses) on the skin.Hospital care, if the condition is very bad.Follow these instructions at home:  Medicines     Take over-the-counter and prescription medicines only as told by your doctor.If you were prescribed an antibiotic medicine, take it as told by your doctor. Do not stop taking it even if you start to feel better.General instructions        Drink enough fluid to keep your pee (urine) pale yellow.Do not touch or rub the infected area.Raise (elevate) the infected area above the level of your heart while you are sitting or lying down.Place cold or warm cloths on the area as told by your doctor.Keep all follow-up visits as told by your doctor. This is important.Contact a doctor if:  You have a fever.You do not start to get better after 1–2 days of treatment.Your bone or joint under the infected area starts to hurt after the skin has healed.Your infection comes back. This can happen in the same area or another area.You have a swollen bump in the area.You have new symptoms.You feel ill and have muscle aches and pains.Get help right away if:  Your symptoms get worse.You feel very sleepy.You throw up (vomit) or have watery poop (diarrhea) for a long time.You see red streaks coming from the area.Your red area gets larger.Your red area turns dark in color.These symptoms may represent a serious problem that is an emergency. Do not wait to see if the symptoms will go away. Get medical help right away. Call your local emergency services (911 in the [place] Do not drive yourself to the hospital.   Summary  Cellulitis is a skin infection. The area is often warm, red, swollen, and sore.This condition is treated with medicines, rest, and cold and warm cloths.Take all medicines only as told by your doctor.Tell your doctor if symptoms do not start to get better after 1–2 days of treatment.

## 2020-06-21 NOTE — ED ADULT TRIAGE NOTE - CHIEF COMPLAINT QUOTE
Pt presents to ED w/ c/o insect bite from yesterday. Noticed hand was swollen today, took Benadryl around 7pm due to itchiness, noticed swelling increased to wrist afterwards.

## 2020-06-21 NOTE — ED PROVIDER NOTE - PATIENT PORTAL LINK FT
You can access the FollowMyHealth Patient Portal offered by [facility] by registering at the following website: [email or web address]. By joining [facility]’s FollowMyHealth portal, you will also be able to view your health information using other applications (apps) compatible with our system.

## 2020-06-21 NOTE — ED PROVIDER NOTE - OBJECTIVE STATEMENT
Pt is 44 y/o male with PMHx of anxiety, IBS here for eval s/p insect bite to Lt hand. Pt states that while in his backyard yesterday he was "bitten by something" to dorsal aspect of the hand, today am pt woke up with erythema, edema and itching to entire hand, took 25 mg of benadryl as well as applied cortizone cream to the hand w/o relief. There is no fever, chills reported, pt denies ext weakness or numbness, denies erythematous streaking. no additional insect bites; denies hiking hx or tick bites, joint pain;

## 2020-06-22 ENCOUNTER — APPOINTMENT (OUTPATIENT)
Dept: ORTHOPEDIC SURGERY | Facility: CLINIC | Age: 45
End: 2020-06-22
Payer: MEDICAID

## 2020-06-22 VITALS — TEMPERATURE: 97.6 F

## 2020-06-22 DIAGNOSIS — M65.4 RADIAL STYLOID TENOSYNOVITIS [DE QUERVAIN]: ICD-10-CM

## 2020-06-22 DIAGNOSIS — M25.832 OTHER SPECIFIED JOINT DISORDERS, LEFT WRIST: ICD-10-CM

## 2020-06-22 DIAGNOSIS — M25.831 OTHER SPECIFIED JOINT DISORDERS, RIGHT WRIST: ICD-10-CM

## 2020-06-22 PROCEDURE — 99214 OFFICE O/P EST MOD 30 MIN: CPT | Mod: 25

## 2020-06-22 PROCEDURE — 20526 THER INJECTION CARP TUNNEL: CPT | Mod: LT

## 2020-06-23 ENCOUNTER — APPOINTMENT (OUTPATIENT)
Dept: INTERNAL MEDICINE | Facility: CLINIC | Age: 45
End: 2020-06-23
Payer: MEDICAID

## 2020-06-23 VITALS
WEIGHT: 169 LBS | OXYGEN SATURATION: 80 % | TEMPERATURE: 97.5 F | DIASTOLIC BLOOD PRESSURE: 70 MMHG | HEIGHT: 67 IN | HEART RATE: 96 BPM | BODY MASS INDEX: 26.53 KG/M2 | SYSTOLIC BLOOD PRESSURE: 110 MMHG

## 2020-06-23 DIAGNOSIS — L03.119 CELLULITIS OF UNSPECIFIED PART OF LIMB: ICD-10-CM

## 2020-06-23 DIAGNOSIS — Z86.018 PERSONAL HISTORY OF OTHER BENIGN NEOPLASM: ICD-10-CM

## 2020-06-23 PROCEDURE — 99214 OFFICE O/P EST MOD 30 MIN: CPT

## 2020-06-23 RX ORDER — ALPRAZOLAM 1 MG/1
1 TABLET ORAL
Refills: 0 | Status: DISCONTINUED | COMMUNITY
End: 2020-06-23

## 2020-06-23 RX ORDER — SERTRALINE HYDROCHLORIDE 100 MG/1
100 TABLET, FILM COATED ORAL
Refills: 0 | Status: DISCONTINUED | COMMUNITY
End: 2020-06-23

## 2020-06-23 RX ORDER — DIAZEPAM 5 MG/1
5 TABLET ORAL EVERY 8 HOURS
Qty: 21 | Refills: 0 | Status: DISCONTINUED | COMMUNITY
Start: 2019-11-06 | End: 2020-06-23

## 2020-06-23 RX ORDER — CHLORDIAZEPOXIDE HYDROCHLORIDE AND CLIDINIUM BROMIDE 5; 2.5 MG/1; MG/1
CAPSULE ORAL
Refills: 0 | Status: DISCONTINUED | COMMUNITY
End: 2020-06-23

## 2020-06-24 NOTE — HISTORY OF PRESENT ILLNESS
[FreeTextEntry1] : FUV from [place] ER visit 6/21 [de-identified] : MARISELA PARKER is a 44 yo man with history of meningioma resection 10/19 here for a fuv after ER visit for left hand swelling 6/21.  The patient reports that he had a bug bite to the hand.  It became increasingly red, swollen and hot to the touch.  A friend who is a LPN told him to go to the er which he did.  He was seen and discharged on cephalexin for a cellulitis.  His hand and  fingers are much, much improved.  Decreased redness and swelling\par \par The patient saw Dr Mack for carpal tunnel and received steroid injections yesterday\par \par The patient feels well overall.  Denies fever

## 2020-06-24 NOTE — PHYSICAL EXAM
[No Acute Distress] : no acute distress [Well Nourished] : well nourished [No Lymphadenopathy] : no lymphadenopathy [Well Developed] : well developed [Well-Appearing] : well-appearing [No Respiratory Distress] : no respiratory distress  [No Accessory Muscle Use] : no accessory muscle use [Clear to Auscultation] : lungs were clear to auscultation bilaterally [Regular Rhythm] : with a regular rhythm [Normal Rate] : normal rate  [Normal S1, S2] : normal S1 and S2 [de-identified] : left hand with mild swelling.

## 2020-06-24 NOTE — REVIEW OF SYSTEMS
[Itching] : itching [Fever] : no fever [Nasal Discharge] : no nasal discharge [Vision Problems] : no vision problems [Chest Pain] : no chest pain [Shortness Of Breath] : no shortness of breath [Abdominal Pain] : no abdominal pain

## 2020-06-30 ENCOUNTER — APPOINTMENT (OUTPATIENT)
Dept: GASTROENTEROLOGY | Facility: CLINIC | Age: 45
End: 2020-06-30
Payer: MEDICAID

## 2020-06-30 VITALS
HEART RATE: 55 BPM | DIASTOLIC BLOOD PRESSURE: 64 MMHG | BODY MASS INDEX: 25.43 KG/M2 | OXYGEN SATURATION: 98 % | HEIGHT: 67 IN | TEMPERATURE: 97.7 F | WEIGHT: 162 LBS | SYSTOLIC BLOOD PRESSURE: 90 MMHG

## 2020-06-30 DIAGNOSIS — K59.09 OTHER CONSTIPATION: ICD-10-CM

## 2020-06-30 DIAGNOSIS — R68.81 EARLY SATIETY: ICD-10-CM

## 2020-06-30 DIAGNOSIS — K25.3 ACUTE GASTRIC ULCER W/OUT HEMORRHAGE OR PERFORATION: ICD-10-CM

## 2020-06-30 PROCEDURE — 99214 OFFICE O/P EST MOD 30 MIN: CPT

## 2020-06-30 NOTE — ASSESSMENT
[FreeTextEntry1] : Impression,\par \par Irritable bowel syndrome\par \par Gastroesophageal reflux disease\par \par Hemorrhoids and rectal bleeding, hemorrhoids were noted on digital rectal exam today\par \par Suggest,\par \par Renewal of Librax tablets, 1 tablet up to 3 times a day as needed\par \par Pepcid 40 mg in the morning\par \par Antireflux diet\par \par Follow-up visit in 3 to 4 months\par \par Sits baths\par \par Sits baths

## 2020-06-30 NOTE — REASON FOR VISIT
[FreeTextEntry1] : Irritable bowel syndrome with bloating and sometimes constipation and diarrhea, hemorrhoids with occasional blood per rectum

## 2020-06-30 NOTE — PHYSICAL EXAM
[General Appearance - In No Acute Distress] : in no acute distress [General Appearance - Alert] : alert [General Appearance - Well Developed] : well developed [General Appearance - Well Nourished] : well nourished [General Appearance - Well-Appearing] : healthy appearing [Sclera] : the sclera and conjunctiva were normal [Neck Cervical Mass (___cm)] : no neck mass was observed [Neck Appearance] : the appearance of the neck was normal [Auscultation Breath Sounds / Voice Sounds] : lungs were clear to auscultation bilaterally [Jugular Venous Distention Increased] : there was no jugular-venous distention [Heart Rate And Rhythm] : heart rate was normal and rhythm regular [Heart Sounds] : normal S1 and S2 [Heart Sounds Gallop] : no gallops [Murmurs] : no murmurs [Heart Sounds Pericardial Friction Rub] : no pericardial rub [Full Pulse] : the pedal pulses are present [Edema] : there was no peripheral edema [Bowel Sounds] : normal bowel sounds [Abdomen Soft] : soft [Abdomen Tenderness] : non-tender [Abdomen Mass (___ Cm)] : no abdominal mass palpated [Cervical Lymph Nodes Enlarged Posterior Bilaterally] : posterior cervical [Cervical Lymph Nodes Enlarged Anterior Bilaterally] : anterior cervical [Supraclavicular Lymph Nodes Enlarged Bilaterally] : supraclavicular [Axillary Lymph Nodes Enlarged Bilaterally] : axillary [Femoral Lymph Nodes Enlarged Bilaterally] : femoral [Inguinal Lymph Nodes Enlarged Bilaterally] : inguinal [Abnormal Walk] : normal gait [Nail Clubbing] : no clubbing  or cyanosis of the fingernails [Musculoskeletal - Swelling] : no joint swelling seen [Motor Tone] : muscle strength and tone were normal [Skin Color & Pigmentation] : normal skin color and pigmentation [Skin Turgor] : normal skin turgor [] : no rash [Oriented To Time, Place, And Person] : oriented to person, place, and time [Impaired Insight] : insight and judgment were intact [Affect] : the affect was normal [Normal Sphincter Tone] : normal sphincter tone [No Rectal Mass] : no rectal mass [External Hemorrhoid] : external hemorrhoids [Occult Blood Positive] : stool was negative for occult blood [No CVA Tenderness] : no ~M costovertebral angle tenderness [No Spinal Tenderness] : no spinal tenderness [No Focal Deficits] : no focal deficits

## 2020-06-30 NOTE — HISTORY OF PRESENT ILLNESS
[de-identified] : Nehal Montgomery MD takes care of this very pleasant 45-year-old gentleman with a long history of irritable bowel syndrome\par \par He has a variety of symptoms including bloating, sometimes constipation, sometimes loose bowel movements\par \par He has had fairly extensive work-up without any etiology found\par \par He had recent transsphenoidal removal of the benign lesion\par \par He feels his irritable bowel has exacerbated during the coronavirus\par \par He did not have coronavirus\par \par Librax generally works very well\par \par He has run out of medication and symptoms are worse\par \par He takes 1 or 2 tablets a day\par \par Marijuana also helps\par \par He has been having some rectal bleeding\par \par He has hemorrhoids\par \par There is no weight loss\par \par No nausea or vomiting at this time

## 2020-07-10 ENCOUNTER — APPOINTMENT (OUTPATIENT)
Dept: MRI IMAGING | Facility: CLINIC | Age: 45
End: 2020-07-10
Payer: MEDICAID

## 2020-07-10 ENCOUNTER — OUTPATIENT (OUTPATIENT)
Dept: OUTPATIENT SERVICES | Facility: HOSPITAL | Age: 45
LOS: 1 days | End: 2020-07-10
Payer: MEDICAID

## 2020-07-10 ENCOUNTER — RESULT REVIEW (OUTPATIENT)
Age: 45
End: 2020-07-10

## 2020-07-10 DIAGNOSIS — Z00.8 ENCOUNTER FOR OTHER GENERAL EXAMINATION: ICD-10-CM

## 2020-07-10 DIAGNOSIS — Z98.890 OTHER SPECIFIED POSTPROCEDURAL STATES: Chronic | ICD-10-CM

## 2020-07-10 PROCEDURE — 73221 MRI JOINT UPR EXTREM W/O DYE: CPT | Mod: 26,RT

## 2020-07-10 PROCEDURE — 73221 MRI JOINT UPR EXTREM W/O DYE: CPT

## 2020-07-16 ENCOUNTER — APPOINTMENT (OUTPATIENT)
Dept: INTERNAL MEDICINE | Facility: CLINIC | Age: 45
End: 2020-07-16
Payer: MEDICAID

## 2020-07-16 ENCOUNTER — LABORATORY RESULT (OUTPATIENT)
Age: 45
End: 2020-07-16

## 2020-07-16 VITALS
OXYGEN SATURATION: 97 % | BODY MASS INDEX: 25.43 KG/M2 | HEIGHT: 67 IN | SYSTOLIC BLOOD PRESSURE: 95 MMHG | DIASTOLIC BLOOD PRESSURE: 60 MMHG | HEART RATE: 71 BPM | TEMPERATURE: 97.6 F | WEIGHT: 162 LBS

## 2020-07-16 DIAGNOSIS — Z11.59 ENCOUNTER FOR SCREENING FOR OTHER VIRAL DISEASES: ICD-10-CM

## 2020-07-16 PROCEDURE — 99396 PREV VISIT EST AGE 40-64: CPT | Mod: 25

## 2020-07-16 PROCEDURE — G0444 DEPRESSION SCREEN ANNUAL: CPT

## 2020-07-16 PROCEDURE — 36415 COLL VENOUS BLD VENIPUNCTURE: CPT

## 2020-07-16 RX ORDER — DOXYCYCLINE HYCLATE 100 MG/1
100 TABLET ORAL
Qty: 10 | Refills: 0 | Status: DISCONTINUED | COMMUNITY
Start: 2020-06-25 | End: 2020-07-16

## 2020-07-16 NOTE — PHYSICAL EXAM
[No Acute Distress] : no acute distress [Well Nourished] : well nourished [Well Developed] : well developed [Well-Appearing] : well-appearing [Normal Voice/Communication] : normal voice/communication [Normal Sclera/Conjunctiva] : normal sclera/conjunctiva [PERRL] : pupils equal round and reactive to light [Normal Outer Ear/Nose] : the outer ears and nose were normal in appearance [Normal Oropharynx] : the oropharynx was normal [Normal TMs] : both tympanic membranes were normal [No JVD] : no jugular venous distention [No Lymphadenopathy] : no lymphadenopathy [Supple] : supple [Thyroid Normal, No Nodules] : the thyroid was normal and there were no nodules present [No Respiratory Distress] : no respiratory distress  [No Accessory Muscle Use] : no accessory muscle use [Clear to Auscultation] : lungs were clear to auscultation bilaterally [Normal Rate] : normal rate  [Regular Rhythm] : with a regular rhythm [Normal S1, S2] : normal S1 and S2 [No Murmur] : no murmur heard [No Carotid Bruits] : no carotid bruits [No Abdominal Bruit] : a ~M bruit was not heard ~T in the abdomen [No Varicosities] : no varicosities [Pedal Pulses Present] : the pedal pulses are present [No Edema] : there was no peripheral edema [No Palpable Aorta] : no palpable aorta [No Extremity Clubbing/Cyanosis] : no extremity clubbing/cyanosis [Soft] : abdomen soft [Non Tender] : non-tender [Non-distended] : non-distended [No HSM] : no HSM [No Masses] : no abdominal mass palpated [Normal Supraclavicular Nodes] : no supraclavicular lymphadenopathy [Normal Bowel Sounds] : normal bowel sounds [No CVA Tenderness] : no CVA  tenderness [Normal Anterior Cervical Nodes] : no anterior cervical lymphadenopathy [Normal Posterior Cervical Nodes] : no posterior cervical lymphadenopathy [No Joint Swelling] : no joint swelling [No Spinal Tenderness] : no spinal tenderness [Grossly Normal Strength/Tone] : grossly normal strength/tone [No Rash] : no rash [Coordination Grossly Intact] : coordination grossly intact [Normal Gait] : normal gait [No Focal Deficits] : no focal deficits [Speech Grossly Normal] : speech grossly normal [Normal Affect] : the affect was normal [Alert and Oriented x3] : oriented to person, place, and time [Normal Mood] : the mood was normal [Normal Insight/Judgement] : insight and judgment were intact

## 2020-07-16 NOTE — HISTORY OF PRESENT ILLNESS
[de-identified] : 46 y/o man presents for annual exam. feeling well overall currently, no new concerns. no infections during COVID19 pandemic. \par \par chronic issues are stable \par IBS-like symptoms controlled w diet and occasional use of Librax prn, following w Dr Loredo \par chronic anxiety/panic disorder controlled on SSRI and alprazolam, taking as prescribed \par \par s/p cranial surgery for excision of benign meningioma in 10/19 which was successful, no complications

## 2020-07-16 NOTE — ASSESSMENT
[FreeTextEntry1] : discussed w pt \par \par he is doing well clinically, chronic issues are stable \par noted recent endocrine eval for diaphoresis, unremarkable workup, symptoms seem to have improved \par \par cont current rx. . aware of risks of chronic benzodiazepine use but he has significant underlying anxiety disorder which is improve recently. cont SSRI \par \par check routine labs as below\par \par diet, exercise , weight control advised \par \par cont GI f/u w Dr Loredo, Librax prn , likely IBS symptoms , often related to his anxiety \par \par successful cranial surgery for meningioma excision, reviewed pathology. f/u yearly for brain imaging as advised w Dr Amado \par \par RTO yearly for routine exam or earlier prn if any new concerns

## 2020-07-20 ENCOUNTER — APPOINTMENT (OUTPATIENT)
Dept: ENDOCRINOLOGY | Facility: CLINIC | Age: 45
End: 2020-07-20
Payer: MEDICAID

## 2020-07-20 VITALS
WEIGHT: 162 LBS | OXYGEN SATURATION: 98 % | SYSTOLIC BLOOD PRESSURE: 99 MMHG | BODY MASS INDEX: 25.37 KG/M2 | DIASTOLIC BLOOD PRESSURE: 60 MMHG | HEART RATE: 72 BPM

## 2020-07-20 DIAGNOSIS — Z86.018 OTHER SPECIFIED POSTPROCEDURAL STATES: ICD-10-CM

## 2020-07-20 DIAGNOSIS — Z98.890 OTHER SPECIFIED POSTPROCEDURAL STATES: ICD-10-CM

## 2020-07-20 DIAGNOSIS — R61 GENERALIZED HYPERHIDROSIS: ICD-10-CM

## 2020-07-20 DIAGNOSIS — F41.0 PANIC DISORDER [EPISODIC PAROXYSMAL ANXIETY]: ICD-10-CM

## 2020-07-20 LAB
DOPAMINE UR-MCNC: 174 UG/L
DOPAMINE, UR, 24HR: 348 UG/24 HR
EPINEPH UR-MCNC: 6 UG/L
EPINEPHRINE, U, 24HR: 12 UG/24 HR
METAINT: 24 H
METANEPH 24H UR-MRATE: 110 MCG/24 H
METANEPH UR-MCNC: 2000 ML
METANEPHS UR-MCNC: 456 MCG/24 H
NOREPINEPH UR-MCNC: 42 UG/L
NOREPINEPHRINE,U,24H: 84 UG/24 HR
NORMETANEPHRINE 24H UR-MCNC: 346 MCG/24 H

## 2020-07-20 PROCEDURE — 99213 OFFICE O/P EST LOW 20 MIN: CPT

## 2020-07-20 RX ORDER — FLUTICASONE PROPIONATE 0.5 MG/G
0.05 CREAM TOPICAL
Qty: 15 | Refills: 0 | Status: COMPLETED | COMMUNITY
Start: 2020-06-21 | End: 2020-07-20

## 2020-07-21 PROBLEM — F41.0 PANIC ATTACKS: Status: ACTIVE | Noted: 2019-01-30

## 2020-07-21 PROBLEM — R61 DIAPHORESIS: Status: ACTIVE | Noted: 2020-02-27

## 2020-07-21 PROBLEM — Z98.890 S/P TRANSSPHENOIDAL SELECTIVE ADENOMECTOMY: Status: ACTIVE | Noted: 2019-10-25

## 2020-07-31 ENCOUNTER — RX RENEWAL (OUTPATIENT)
Age: 45
End: 2020-07-31

## 2020-08-13 LAB
ALBUMIN SERPL ELPH-MCNC: 4.8 G/DL
ALP BLD-CCNC: 86 U/L
ALT SERPL-CCNC: 15 U/L
ANION GAP SERPL CALC-SCNC: 14 MMOL/L
APPEARANCE: ABNORMAL
AST SERPL-CCNC: 20 U/L
BASOPHILS # BLD AUTO: 0.02 K/UL
BASOPHILS NFR BLD AUTO: 0.4 %
BILIRUB SERPL-MCNC: 0.2 MG/DL
BILIRUBIN URINE: NEGATIVE
BLOOD URINE: NEGATIVE
BUN SERPL-MCNC: 16 MG/DL
CALCIUM SERPL-MCNC: 9.5 MG/DL
CHLORIDE SERPL-SCNC: 102 MMOL/L
CHOLEST SERPL-MCNC: 292 MG/DL
CHOLEST/HDLC SERPL: 6.7 RATIO
CO2 SERPL-SCNC: 23 MMOL/L
COLOR: YELLOW
CREAT SERPL-MCNC: 0.92 MG/DL
EOSINOPHIL # BLD AUTO: 0.17 K/UL
EOSINOPHIL NFR BLD AUTO: 3.1 %
ESTIMATED AVERAGE GLUCOSE: 114 MG/DL
GLUCOSE QUALITATIVE U: NEGATIVE
GLUCOSE SERPL-MCNC: 95 MG/DL
HBA1C MFR BLD HPLC: 5.6 %
HCT VFR BLD CALC: 51 %
HDLC SERPL-MCNC: 43 MG/DL
HGB BLD-MCNC: 16.6 G/DL
IMM GRANULOCYTES NFR BLD AUTO: 0.2 %
KETONES URINE: NEGATIVE
LDLC SERPL CALC-MCNC: 210 MG/DL
LEUKOCYTE ESTERASE URINE: ABNORMAL
LYMPHOCYTES # BLD AUTO: 1.67 K/UL
LYMPHOCYTES NFR BLD AUTO: 30.6 %
MAN DIFF?: NORMAL
MCHC RBC-ENTMCNC: 28.5 PG
MCHC RBC-ENTMCNC: 32.5 GM/DL
MCV RBC AUTO: 87.5 FL
MONOCYTES # BLD AUTO: 0.42 K/UL
MONOCYTES NFR BLD AUTO: 7.7 %
NEUTROPHILS # BLD AUTO: 3.16 K/UL
NEUTROPHILS NFR BLD AUTO: 58 %
NITRITE URINE: NEGATIVE
PH URINE: 6
PLATELET # BLD AUTO: 186 K/UL
POTASSIUM SERPL-SCNC: 4.4 MMOL/L
PROT SERPL-MCNC: 7.2 G/DL
PROTEIN URINE: NORMAL
RBC # BLD: 5.83 M/UL
RBC # FLD: 13 %
SARS-COV-2 IGG SERPL IA-ACNC: 0.01 INDEX
SARS-COV-2 IGG SERPL QL IA: NEGATIVE
SODIUM SERPL-SCNC: 139 MMOL/L
SPECIFIC GRAVITY URINE: 1.03
T4 FREE SERPL-MCNC: 1.2 NG/DL
TRIGL SERPL-MCNC: 193 MG/DL
TSH SERPL-ACNC: 1.21 UIU/ML
UROBILINOGEN URINE: NORMAL
WBC # FLD AUTO: 5.45 K/UL

## 2020-09-02 ENCOUNTER — RX RENEWAL (OUTPATIENT)
Age: 45
End: 2020-09-02

## 2020-09-03 ENCOUNTER — RX RENEWAL (OUTPATIENT)
Age: 45
End: 2020-09-03

## 2020-09-09 ENCOUNTER — APPOINTMENT (OUTPATIENT)
Dept: OTOLARYNGOLOGY | Facility: CLINIC | Age: 45
End: 2020-09-09
Payer: MEDICAID

## 2020-09-09 VITALS
SYSTOLIC BLOOD PRESSURE: 98 MMHG | TEMPERATURE: 97.6 F | WEIGHT: 160 LBS | HEART RATE: 57 BPM | BODY MASS INDEX: 25.11 KG/M2 | HEIGHT: 67 IN | DIASTOLIC BLOOD PRESSURE: 65 MMHG

## 2020-09-09 PROCEDURE — 99214 OFFICE O/P EST MOD 30 MIN: CPT | Mod: 25

## 2020-09-09 PROCEDURE — 31575 DIAGNOSTIC LARYNGOSCOPY: CPT
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
## 2020-09-09 NOTE — PROCEDURE
[de-identified] : reason for laryngoscopy: unable to cooperate with mirror \par \par Fiberoptic laryngoscopy was performed on the patient.  R/b/a was explained to the patient and they agreed to proceed with procedure.  Nasal cavities were treated with lidocaine and afrin prior to laryngoscopy for comfort.  Nasal cavities: clear b/l, Nasopharynx: mild erythema and swelling, Oropharynx/Hypopharynx: + lingual tonsillar hypertrophy no masses or lesions, posterior pharyngeal wall with cobblestoning.  No BOT hypertrophy, vallecula is clear of any masses or cysts, Supraglottis: epiglottis sharp with normal bilateral arytenoids, aryepiglottic folds, ventricles but with + interarytenoid edema consistent with reflux, Glottis: clear, TVCs mobile b/l.  Subglottis clear  No pooling of secretions in the pyriform sinus.  Airway patent\par \par Scope #:29\par \par
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
## 2020-09-16 ENCOUNTER — APPOINTMENT (OUTPATIENT)
Dept: ORTHOPEDIC SURGERY | Facility: CLINIC | Age: 45
End: 2020-09-16
Payer: MEDICAID

## 2020-09-16 VITALS
WEIGHT: 162 LBS | BODY MASS INDEX: 25.43 KG/M2 | HEIGHT: 67 IN | SYSTOLIC BLOOD PRESSURE: 103 MMHG | DIASTOLIC BLOOD PRESSURE: 63 MMHG | HEART RATE: 62 BPM | OXYGEN SATURATION: 97 %

## 2020-09-16 DIAGNOSIS — G56.22 LESION OF ULNAR NERVE, LEFT UPPER LIMB: ICD-10-CM

## 2020-09-16 DIAGNOSIS — G56.21 LESION OF ULNAR NERVE, RIGHT UPPER LIMB: ICD-10-CM

## 2020-09-16 PROCEDURE — 99214 OFFICE O/P EST MOD 30 MIN: CPT | Mod: 25

## 2020-09-16 PROCEDURE — 20526 THER INJECTION CARP TUNNEL: CPT | Mod: 59,RT

## 2020-09-21 ENCOUNTER — RX RENEWAL (OUTPATIENT)
Age: 45
End: 2020-09-21

## 2020-10-02 ENCOUNTER — TRANSCRIPTION ENCOUNTER (OUTPATIENT)
Age: 45
End: 2020-10-02

## 2020-10-05 ENCOUNTER — RESULT REVIEW (OUTPATIENT)
Age: 45
End: 2020-10-05

## 2020-10-05 ENCOUNTER — APPOINTMENT (OUTPATIENT)
Dept: MRI IMAGING | Facility: CLINIC | Age: 45
End: 2020-10-05
Payer: MEDICAID

## 2020-10-05 ENCOUNTER — TRANSCRIPTION ENCOUNTER (OUTPATIENT)
Age: 45
End: 2020-10-05

## 2020-10-05 ENCOUNTER — OUTPATIENT (OUTPATIENT)
Dept: OUTPATIENT SERVICES | Facility: HOSPITAL | Age: 45
LOS: 1 days | End: 2020-10-05
Payer: MEDICAID

## 2020-10-05 DIAGNOSIS — Z00.8 ENCOUNTER FOR OTHER GENERAL EXAMINATION: ICD-10-CM

## 2020-10-05 DIAGNOSIS — Z98.890 OTHER SPECIFIED POSTPROCEDURAL STATES: Chronic | ICD-10-CM

## 2020-10-05 PROCEDURE — 70553 MRI BRAIN STEM W/O & W/DYE: CPT

## 2020-10-05 PROCEDURE — A9585: CPT

## 2020-10-05 PROCEDURE — 70553 MRI BRAIN STEM W/O & W/DYE: CPT | Mod: 26

## 2020-10-07 ENCOUNTER — APPOINTMENT (OUTPATIENT)
Dept: SPINE | Facility: CLINIC | Age: 45
End: 2020-10-07
Payer: MEDICAID

## 2020-10-07 VITALS
HEART RATE: 65 BPM | OXYGEN SATURATION: 97 % | SYSTOLIC BLOOD PRESSURE: 109 MMHG | WEIGHT: 162 LBS | DIASTOLIC BLOOD PRESSURE: 72 MMHG | BODY MASS INDEX: 25.43 KG/M2 | TEMPERATURE: 97.5 F | HEIGHT: 67 IN

## 2020-10-07 PROCEDURE — 99213 OFFICE O/P EST LOW 20 MIN: CPT

## 2020-10-08 NOTE — DATA REVIEWED
[de-identified] : Brain and sella with and without contrast done at [facility] on 10/5/2020 and compared to 1/23/2020 and 10/16/2019.

## 2020-10-08 NOTE — ASSESSMENT
[FreeTextEntry1] : These images and findings were reviewed and discussed with the patient. It was recommended that he return in one year with a new MRI of the brain and cell with her without contrast for surveillance.

## 2020-10-08 NOTE — REASON FOR VISIT
[Follow-Up: _____] : a [unfilled] follow-up visit [FreeTextEntry1] : MARISELA PARKER is a 45 year old gentleman who underwent an endoscopic transnasal transsphenoidal approach to the anterior skull base with opening of the anterior skull base resection of a meningioma on 1017 2019. The patient is here for review of a new MRI of the brain and sella with and without contrast. He stated that he is doing well and offers no complaints.

## 2020-10-20 ENCOUNTER — APPOINTMENT (OUTPATIENT)
Dept: ORTHOPEDIC SURGERY | Facility: CLINIC | Age: 45
End: 2020-10-20
Payer: MEDICAID

## 2020-10-20 DIAGNOSIS — M25.542 PAIN IN JOINTS OF RIGHT HAND: ICD-10-CM

## 2020-10-20 DIAGNOSIS — M25.541 PAIN IN JOINTS OF RIGHT HAND: ICD-10-CM

## 2020-10-20 PROCEDURE — 73110 X-RAY EXAM OF WRIST: CPT | Mod: RT

## 2020-10-20 PROCEDURE — 99214 OFFICE O/P EST MOD 30 MIN: CPT

## 2020-10-20 PROCEDURE — 99072 ADDL SUPL MATRL&STAF TM PHE: CPT

## 2020-11-05 ENCOUNTER — TRANSCRIPTION ENCOUNTER (OUTPATIENT)
Age: 45
End: 2020-11-05

## 2020-11-19 ENCOUNTER — APPOINTMENT (OUTPATIENT)
Dept: INTERNAL MEDICINE | Facility: CLINIC | Age: 45
End: 2020-11-19
Payer: MEDICAID

## 2020-11-19 VITALS
SYSTOLIC BLOOD PRESSURE: 110 MMHG | TEMPERATURE: 98.2 F | HEIGHT: 67 IN | OXYGEN SATURATION: 98 % | DIASTOLIC BLOOD PRESSURE: 70 MMHG | HEART RATE: 78 BPM | BODY MASS INDEX: 25.43 KG/M2 | WEIGHT: 162 LBS

## 2020-11-19 PROCEDURE — 99214 OFFICE O/P EST MOD 30 MIN: CPT

## 2020-11-19 NOTE — PHYSICAL EXAM
[No Acute Distress] : no acute distress [Well-Appearing] : well-appearing [Normal Voice/Communication] : normal voice/communication [No Lymphadenopathy] : no lymphadenopathy [Normal] : normal rate, regular rhythm, normal S1 and S2 and no murmur heard [No Carotid Bruits] : no carotid bruits [No Edema] : there was no peripheral edema [Grossly Normal Strength/Tone] : grossly normal strength/tone [Normal Gait] : normal gait [Speech Grossly Normal] : speech grossly normal [Normal Affect] : the affect was normal [Normal Mood] : the mood was normal [Normal Insight/Judgement] : insight and judgment were intact

## 2020-11-23 ENCOUNTER — OUTPATIENT (OUTPATIENT)
Dept: OUTPATIENT SERVICES | Facility: HOSPITAL | Age: 45
LOS: 1 days | End: 2020-11-23
Payer: MEDICAID

## 2020-11-23 VITALS
RESPIRATION RATE: 16 BRPM | SYSTOLIC BLOOD PRESSURE: 105 MMHG | DIASTOLIC BLOOD PRESSURE: 70 MMHG | HEIGHT: 67 IN | HEART RATE: 80 BPM | OXYGEN SATURATION: 96 % | TEMPERATURE: 97 F

## 2020-11-23 DIAGNOSIS — Z98.890 OTHER SPECIFIED POSTPROCEDURAL STATES: Chronic | ICD-10-CM

## 2020-11-23 DIAGNOSIS — G56.03 CARPAL TUNNEL SYNDROME, BILATERAL UPPER LIMBS: ICD-10-CM

## 2020-11-23 DIAGNOSIS — Z01.818 ENCOUNTER FOR OTHER PREPROCEDURAL EXAMINATION: ICD-10-CM

## 2020-11-23 DIAGNOSIS — G56.01 CARPAL TUNNEL SYNDROME, RIGHT UPPER LIMB: ICD-10-CM

## 2020-11-23 LAB
ALBUMIN SERPL ELPH-MCNC: 4.3 G/DL — SIGNIFICANT CHANGE UP (ref 3.3–5)
ALP SERPL-CCNC: 85 U/L — SIGNIFICANT CHANGE UP (ref 40–120)
ALT FLD-CCNC: 14 U/L — SIGNIFICANT CHANGE UP (ref 10–45)
ANION GAP SERPL CALC-SCNC: 10 MMOL/L — SIGNIFICANT CHANGE UP (ref 5–17)
AST SERPL-CCNC: 19 U/L — SIGNIFICANT CHANGE UP (ref 10–40)
BILIRUB SERPL-MCNC: 0.2 MG/DL — SIGNIFICANT CHANGE UP (ref 0.2–1.2)
BUN SERPL-MCNC: 15 MG/DL — SIGNIFICANT CHANGE UP (ref 7–23)
CALCIUM SERPL-MCNC: 9.5 MG/DL — SIGNIFICANT CHANGE UP (ref 8.4–10.5)
CHLORIDE SERPL-SCNC: 105 MMOL/L — SIGNIFICANT CHANGE UP (ref 96–108)
CO2 SERPL-SCNC: 24 MMOL/L — SIGNIFICANT CHANGE UP (ref 22–31)
CREAT SERPL-MCNC: 0.85 MG/DL — SIGNIFICANT CHANGE UP (ref 0.5–1.3)
GLUCOSE SERPL-MCNC: 90 MG/DL — SIGNIFICANT CHANGE UP (ref 70–99)
HCT VFR BLD CALC: 48.3 % — SIGNIFICANT CHANGE UP (ref 39–50)
HGB BLD-MCNC: 16.4 G/DL — SIGNIFICANT CHANGE UP (ref 13–17)
MCHC RBC-ENTMCNC: 29.3 PG — SIGNIFICANT CHANGE UP (ref 27–34)
MCHC RBC-ENTMCNC: 34 GM/DL — SIGNIFICANT CHANGE UP (ref 32–36)
MCV RBC AUTO: 86.4 FL — SIGNIFICANT CHANGE UP (ref 80–100)
NRBC # BLD: 0 /100 WBCS — SIGNIFICANT CHANGE UP (ref 0–0)
PLATELET # BLD AUTO: 200 K/UL — SIGNIFICANT CHANGE UP (ref 150–400)
POTASSIUM SERPL-MCNC: 4.5 MMOL/L — SIGNIFICANT CHANGE UP (ref 3.5–5.3)
POTASSIUM SERPL-SCNC: 4.5 MMOL/L — SIGNIFICANT CHANGE UP (ref 3.5–5.3)
PROT SERPL-MCNC: 7.1 G/DL — SIGNIFICANT CHANGE UP (ref 6–8.3)
RBC # BLD: 5.59 M/UL — SIGNIFICANT CHANGE UP (ref 4.2–5.8)
RBC # FLD: 12.9 % — SIGNIFICANT CHANGE UP (ref 10.3–14.5)
SODIUM SERPL-SCNC: 139 MMOL/L — SIGNIFICANT CHANGE UP (ref 135–145)
WBC # BLD: 5.98 K/UL — SIGNIFICANT CHANGE UP (ref 3.8–10.5)
WBC # FLD AUTO: 5.98 K/UL — SIGNIFICANT CHANGE UP (ref 3.8–10.5)

## 2020-11-23 PROCEDURE — 80053 COMPREHEN METABOLIC PANEL: CPT

## 2020-11-23 PROCEDURE — 85027 COMPLETE CBC AUTOMATED: CPT

## 2020-11-23 PROCEDURE — G0463: CPT

## 2020-11-23 RX ORDER — LIDOCAINE HCL 20 MG/ML
0.2 VIAL (ML) INJECTION ONCE
Refills: 0 | Status: DISCONTINUED | OUTPATIENT
Start: 2020-11-30 | End: 2020-12-14

## 2020-11-23 RX ORDER — SODIUM CHLORIDE 9 MG/ML
3 INJECTION INTRAMUSCULAR; INTRAVENOUS; SUBCUTANEOUS EVERY 8 HOURS
Refills: 0 | Status: DISCONTINUED | OUTPATIENT
Start: 2020-11-30 | End: 2020-12-14

## 2020-11-23 RX ORDER — CHLORHEXIDINE GLUCONATE 213 G/1000ML
1 SOLUTION TOPICAL ONCE
Refills: 0 | Status: DISCONTINUED | OUTPATIENT
Start: 2020-11-30 | End: 2020-12-14

## 2020-11-23 NOTE — H&P PST ADULT - NEUROLOGICAL DETAILS
normal strength/no spontaneous movement/responds to pain/alert and oriented x 3/responds to verbal commands

## 2020-11-23 NOTE — H&P PST ADULT - NSICDXPROBLEM_GEN_ALL_CORE_FT
PROBLEM DIAGNOSES  Problem: Bilateral carpal tunnel syndrome  Assessment and Plan:  Jorge Carpal Tunnel Release on 11/30/20  Medical Eval: in allscript   Pre-op education provided - all questions answered. Pt verbalized understanding

## 2020-11-23 NOTE — H&P PST ADULT - NSICDXPASTSURGICALHX_GEN_ALL_CORE_FT
PAST SURGICAL HISTORY:  H/O colonoscopy     H/O endoscopy     S/P arthroscopic knee surgery right knee 1993    S/P resection of meningioma transphenoidal removal of a meningioma 10/2019

## 2020-11-23 NOTE — H&P PST ADULT - NSICDXPASTMEDICALHX_GEN_ALL_CORE_FT
PAST MEDICAL HISTORY:  Anxiety     Bilateral carpal tunnel syndrome     Bulging lumbar disc s/p SARITA    Chronic headaches last 3 yrs    History of IBS     Marijuana use, continuous     Meningioma

## 2020-11-23 NOTE — H&P PST ADULT - HISTORY OF PRESENT ILLNESS
44 y/o with h/o Anxiety,  Chronic back pain (smokes marijuana daily), Meningioma s/p resection on 10/2019, c/o bilateral hand pain, numbness/tingling in thumb thru middle finger bilaterally for over past year s/p bilateral cortisone injections in September with mild relief for a few weeks. Today he presents to PST for scheduled Jorge Carpal Tunnel Release on 11/30/20. Denies any palpitations, SOB, N/V, fever or chills.   ***COVID swab scheduled for 11/25/20***

## 2020-11-25 ENCOUNTER — OUTPATIENT (OUTPATIENT)
Dept: OUTPATIENT SERVICES | Facility: HOSPITAL | Age: 45
LOS: 1 days | End: 2020-11-25
Payer: MEDICAID

## 2020-11-25 DIAGNOSIS — Z11.59 ENCOUNTER FOR SCREENING FOR OTHER VIRAL DISEASES: ICD-10-CM

## 2020-11-25 DIAGNOSIS — Z98.890 OTHER SPECIFIED POSTPROCEDURAL STATES: Chronic | ICD-10-CM

## 2020-11-25 PROBLEM — G56.03 CARPAL TUNNEL SYNDROME, BILATERAL UPPER LIMBS: Chronic | Status: ACTIVE | Noted: 2020-11-23

## 2020-11-25 LAB — SARS-COV-2 RNA SPEC QL NAA+PROBE: SIGNIFICANT CHANGE UP

## 2020-11-25 PROCEDURE — U0003: CPT

## 2020-11-25 NOTE — PLAN
[FreeTextEntry1] : discussed w pt \par \par cont current rx\par \par scheduled for routine preop testing in the coming week. will write addendum when results available. \par \par he will be in his optimal condition for the low risk procedure pending routine preop testing \par \par f/u with orthopedic hand surgery as scheduled\par \par please call with any questions \par \par RTO 6 months for routine f/u or earlier prn if any new concerns

## 2020-11-25 NOTE — ADDENDUM
[FreeTextEntry1] : reviewed preop lab testing which is unremarkable. no contraindications to the planned low risk surgery as scheduled.

## 2020-11-25 NOTE — REVIEW OF SYSTEMS
[Joint Pain] : joint pain [Muscle Pain] : muscle pain [Insomnia] : insomnia [Negative] : Heme/Lymph [Muscle Weakness] : no muscle weakness [Joint Swelling] : no joint swelling [Suicidal] : not suicidal [Depression] : no depression

## 2020-11-25 NOTE — ASSESSMENT
[Patient Optimized for Surgery] : Patient optimized for surgery [Continue medications as is] : Continue current medications [As per surgery] : as per surgery [FreeTextEntry7] : avoid OTC NSAIDs 7 days prior to surgery

## 2020-11-25 NOTE — HISTORY OF PRESENT ILLNESS
[No Pertinent Cardiac History] : no history of aortic stenosis, atrial fibrillation, coronary artery disease, recent myocardial infarction, or implantable device/pacemaker [No Pertinent Pulmonary History] : no history of asthma, COPD, sleep apnea, or smoking [No Adverse Anesthesia Reaction] : no adverse anesthesia reaction in self or family member [(Patient denies any chest pain, claudication, dyspnea on exertion, orthopnea, palpitations or syncope)] : Patient denies any chest pain, claudication, dyspnea on exertion, orthopnea, palpitations or syncope [Excellent (>10 METs)] : Excellent (>10 METs) [Chronic Anticoagulation] : no chronic anticoagulation [Diabetes] : no diabetes [FreeTextEntry1] : - bilateral carpal tunnel release [FreeTextEntry2] : 11/30/20 [FreeTextEntry3] : - Dr Toña Mack - [facility]  [FreeTextEntry4] : \par presents for medical evaluation prior to planned bilateral carpal tunnel release surgery. he feels well overall currently, no new concerns. \par \par no prior problems with anesthesia, no hx of bleeding problems, cardiovascular disease or thromboembolism. \par \par hx of transsphenoidal resection of benign meningioma w Dr Amado in 10/19 without residual complication \par chronic anxiety, depression and benzodiazepine dependence has been stable on rx, overall doing well over the past year

## 2020-11-29 ENCOUNTER — TRANSCRIPTION ENCOUNTER (OUTPATIENT)
Age: 45
End: 2020-11-29

## 2020-11-30 ENCOUNTER — OUTPATIENT (OUTPATIENT)
Dept: OUTPATIENT SERVICES | Facility: HOSPITAL | Age: 45
LOS: 1 days | End: 2020-11-30
Payer: MEDICAID

## 2020-11-30 ENCOUNTER — APPOINTMENT (OUTPATIENT)
Dept: ORTHOPEDIC SURGERY | Facility: HOSPITAL | Age: 45
End: 2020-11-30

## 2020-11-30 VITALS
RESPIRATION RATE: 16 BRPM | TEMPERATURE: 97 F | OXYGEN SATURATION: 96 % | HEART RATE: 64 BPM | SYSTOLIC BLOOD PRESSURE: 98 MMHG | DIASTOLIC BLOOD PRESSURE: 66 MMHG | HEIGHT: 67 IN

## 2020-11-30 VITALS
RESPIRATION RATE: 14 BRPM | HEART RATE: 63 BPM | SYSTOLIC BLOOD PRESSURE: 98 MMHG | DIASTOLIC BLOOD PRESSURE: 52 MMHG | TEMPERATURE: 98 F | OXYGEN SATURATION: 98 %

## 2020-11-30 DIAGNOSIS — Z98.890 OTHER SPECIFIED POSTPROCEDURAL STATES: Chronic | ICD-10-CM

## 2020-11-30 DIAGNOSIS — G56.01 CARPAL TUNNEL SYNDROME, RIGHT UPPER LIMB: ICD-10-CM

## 2020-11-30 PROCEDURE — 64721 CARPAL TUNNEL SURGERY: CPT | Mod: 50

## 2020-11-30 PROCEDURE — 64721 CARPAL TUNNEL SURGERY: CPT | Mod: LT

## 2020-11-30 RX ORDER — SODIUM CHLORIDE 9 MG/ML
1000 INJECTION, SOLUTION INTRAVENOUS
Refills: 0 | Status: DISCONTINUED | OUTPATIENT
Start: 2020-11-30 | End: 2020-12-14

## 2020-11-30 RX ORDER — OXYCODONE HYDROCHLORIDE 5 MG/1
10 TABLET ORAL ONCE
Refills: 0 | Status: DISCONTINUED | OUTPATIENT
Start: 2020-11-30 | End: 2020-11-30

## 2020-11-30 RX ORDER — OXYCODONE HYDROCHLORIDE 5 MG/1
5 TABLET ORAL ONCE
Refills: 0 | Status: DISCONTINUED | OUTPATIENT
Start: 2020-11-30 | End: 2020-11-30

## 2020-11-30 RX ORDER — FENTANYL CITRATE 50 UG/ML
25 INJECTION INTRAVENOUS
Refills: 0 | Status: DISCONTINUED | OUTPATIENT
Start: 2020-11-30 | End: 2020-11-30

## 2020-11-30 RX ORDER — ONDANSETRON 8 MG/1
4 TABLET, FILM COATED ORAL ONCE
Refills: 0 | Status: DISCONTINUED | OUTPATIENT
Start: 2020-11-30 | End: 2020-12-14

## 2020-11-30 NOTE — ASU PATIENT PROFILE, ADULT - PSH
H/O colonoscopy    H/O endoscopy    S/P arthroscopic knee surgery  right knee 1993  S/P resection of meningioma  transphenoidal removal of a meningioma 10/2019

## 2020-11-30 NOTE — PRE-ANESTHESIA EVALUATION ADULT - NSANTHTOBACCOSD_GEN_ALL_CORE
Virtual Regular Visit    Problem List Items Addressed This Visit        Respiratory    Upper respiratory infection - Primary      Other Visit Diagnoses     Anxiety              BMI Counseling: There is no height or weight on file to calculate BMI  The BMI is above normal  Nutrition recommendations include decreasing portion sizes, decreasing fast food intake, limiting drinks that contain sugar, moderation in carbohydrate intake and reducing intake of saturated and trans fat  Exercise recommendations include moderate physical activity 150 minutes/week and exercising 3-5 times per week  No pharmacotherapy was ordered  Reason for visit is   Ill ? Isadora Montes  Encounter provider DARY Aquino    Provider located at [facility] [address] [facility]  [address] [place] [id number] [phone]      Recent Visits  No visits were found meeting these conditions  Showing recent visits within past 7 days and meeting all other requirements     Today's Visits  Date Type Provider Dept   03/30/20 Telemedicine DARY Aquino [address] today's visits and meeting all other requirements     Future Appointments  Date Type Provider Dept   03/30/20 Telemedicine DARY Aquino [address] future appointments within next 150 days and meeting all other requirements        The patient was identified by name and date of birth  Sheng Dawkins was informed that this is a telemedicine visit and that the visit is being conducted through [employer] and patient was informed that this is not a secure, HIPAA-complaint platform  he agrees to proceed     My office door was closed  No one else was in the room  He acknowledged consent and understanding of privacy and security of the video platform   The patient has agreed to participate and understands they can discontinue the visit at any time  Patient is aware this is a billable service  Pj Lubin is a 42 y o  male   Concerned about shortness of breath intermittent episodes which started approximately 3 days ago resolved spontaneously  Denies any fever chills, cough is productive at time green sinus in nature, admittedly has some issues with anxiety with all of the current news flow  Generally feels well, has history of bronchitis does have available albuterol inhaler has not used   Denies any recent exposure works at [employer], no known contacts  Negative travel  Denies any GI issues negative difficulty in breathing wheezing negative fever chills  Self treating with nothing as in negative fever, negative wheezing      No past medical history on file  Past Surgical History:   Procedure Laterality Date    APPENDECTOMY         Current Outpatient Medications   Medication Sig Dispense Refill    albuterol (PROVENTIL HFA,VENTOLIN HFA) 90 mcg/act inhaler Inhale 2 puffs every 6 (six) hours as needed for wheezing or shortness of breath 3 Inhaler 3    amLODIPine-benazepril (LOTREL 5-10) 5-10 MG per capsule Take 1 capsule by mouth daily 90 capsule 0    azelastine (ASTELIN) 0 1 % nasal spray 1 spray into each nostril 2 (two) times a day Use in each nostril as directed 1 Bottle 3    benzonatate (TESSALON) 200 MG capsule Take 1 capsule (200 mg total) by mouth 3 (three) times a day as needed for cough 20 capsule 0    colchicine (COLCRYS) 0 6 mg tablet Take 1 tablet (0 6 mg total) by mouth 2 (two) times a day 30 tablet 0    EPINEPHrine (EPIPEN) 0 3 mg/0 3 mL SOAJ Inject 0 3 mL as directed      febuxostat (ULORIC) 40 mg tablet Take 1 tablet (40 mg total) by mouth daily 30 tablet 5     No current facility-administered medications for this visit           Allergies   Allergen Reactions    Bee Venom Anaphylaxis    Nsaids Anaphylaxis       Review of Systems   Constitutional: Negative for appetite change, chills, fever and unexpected weight change  HENT: Negative for congestion, dental problem, ear pain, hearing loss, postnasal drip, rhinorrhea, sinus pressure, sinus pain, sneezing, sore throat, tinnitus and voice change  Eyes: Negative for visual disturbance  Respiratory: Positive for shortness of breath (Intermittent episodes a x3 days resolved spontaneously lasting less than minutes)  Negative for apnea, cough and chest tightness  Cardiovascular: Negative for chest pain, palpitations and leg swelling  Gastrointestinal: Negative for abdominal pain, blood in stool, constipation, diarrhea, nausea and vomiting  Endocrine: Negative for cold intolerance, heat intolerance, polydipsia, polyphagia and polyuria  Genitourinary: Negative for decreased urine volume, difficulty urinating, dysuria, frequency and hematuria  Musculoskeletal: Negative for arthralgias, back pain, gait problem, joint swelling and myalgias  Skin: Negative for color change, rash and wound  Allergic/Immunologic: Negative for environmental allergies and food allergies  Neurological: Negative for dizziness, syncope, weakness, light-headedness, numbness and headaches  Hematological: Negative for adenopathy  Does not bruise/bleed easily  Psychiatric/Behavioral: Negative for sleep disturbance and suicidal ideas  The patient is not nervous/anxious  Physical Exam   Constitutional: He is oriented to person, place, and time  He appears well-developed and well-nourished  No distress  Video white out   Pulmonary/Chest: Effort normal    Able to speak in complete sentences, negative breathlessness, negative cough   Neurological: He is oriented to person, place, and time  Psychiatric: He has a normal mood and affect  His behavior is normal  Judgment and thought content normal     Latrice was seen today for virtual regular visit      Diagnoses and all orders for this visit:    Upper respiratory tract infection, unspecified type    Anxiety URI  Discussed current plan, reviewed signs and symptoms of current corona, not evident, would not seek out additional testing at this time reviewed current CDC recommendations warnings and cautions, encourage continued self isolation n  Increase oral hydration, warm tea with honey, increase nutrition   Adequate rest  Tylenol or Motrin for pain and/or fever  Nasal mist  Humidify room  Use decongestant- Mucinex  Zinc lozenges   Good handwashing  Anxiety, situational  Discussed issues concerns with patient reassured, advised call for any questions or concerns    I spent 20 minutes with the patient during this visit  No

## 2020-11-30 NOTE — ASU DISCHARGE PLAN (ADULT/PEDIATRIC) - CARE PROVIDER_API CALL
Toña Mack  ORTHOPAEDIC SURGERY  [address], [address]  [place] [address]  Phone: [phone]  Fax: [phone]  Follow Up Time:

## 2020-11-30 NOTE — ASU PATIENT PROFILE, ADULT - PMH
Anxiety    Bilateral carpal tunnel syndrome    Bulging lumbar disc  s/p SARITA  Chronic headaches  last 3 yrs  History of IBS    Marijuana use, continuous    Meningioma

## 2020-11-30 NOTE — BRIEF OPERATIVE NOTE - NSICDXBRIEFPOSTOP_GEN_ALL_CORE_FT
POST-OP DIAGNOSIS:  Carpal tunnel syndrome 30-Nov-2020 09:23:25  Rodolfo Azevedo   Prophylactic measure

## 2020-11-30 NOTE — ASU DISCHARGE PLAN (ADULT/PEDIATRIC) - ASU DC SPECIAL INSTRUCTIONSFT
Please follow up with Dr. Mack within 1-2 weeks. You may call [phone] to schedule an appointment.    Please take medications as prescribed. Please do not participate in heavy lifting or strenuous exercise. Do not drive while taking pain medication.    Please go to the emergency department if you experience pain not controlled by pain medication, bleeding that will not stop, fevers or chills.

## 2020-12-04 ENCOUNTER — TRANSCRIPTION ENCOUNTER (OUTPATIENT)
Age: 45
End: 2020-12-04

## 2020-12-15 ENCOUNTER — APPOINTMENT (OUTPATIENT)
Dept: ORTHOPEDIC SURGERY | Facility: CLINIC | Age: 45
End: 2020-12-15
Payer: MEDICAID

## 2020-12-15 DIAGNOSIS — Z98.890 OTHER SPECIFIED POSTPROCEDURAL STATES: ICD-10-CM

## 2020-12-15 PROCEDURE — 99024 POSTOP FOLLOW-UP VISIT: CPT

## 2020-12-20 ENCOUNTER — TRANSCRIPTION ENCOUNTER (OUTPATIENT)
Age: 45
End: 2020-12-20

## 2020-12-21 ENCOUNTER — NON-APPOINTMENT (OUTPATIENT)
Age: 45
End: 2020-12-21

## 2020-12-21 PROBLEM — J06.9 URI WITH COUGH AND CONGESTION: Status: RESOLVED | Noted: 2019-09-20 | Resolved: 2020-12-21

## 2021-01-03 NOTE — ASU PATIENT PROFILE, ADULT - NSSUBSTANCEUSE_GEN_ALL_CORE_SD
01/03/21 0044   NIV Type   $NIV $Daily Charge   Equipment Type v60   Mode CPAP   Mask Type Full face mask   Mask Size Large   Settings/Measurements   CPAP/EPAP 5 cmH2O   Resp 16   FiO2  25 %   Vt Exhaled 524 mL   Mask Leak (lpm) 25 lpm   Comfort Level Good   Using Accessory Muscles No   SpO2 96   Alarm Settings   Alarms On Y   Press Low Alarm 4 cmH2O   High Pressure Alarm 25 cmH2O   Apnea (secs) 20 secs   Resp Rate Low Alarm 6   High Respiratory Rate 40 br/min street drug/inhalant/medication abuse/caffeine

## 2021-01-05 ENCOUNTER — TRANSCRIPTION ENCOUNTER (OUTPATIENT)
Age: 46
End: 2021-01-05

## 2021-01-18 ENCOUNTER — RX RENEWAL (OUTPATIENT)
Age: 46
End: 2021-01-18

## 2021-02-02 ENCOUNTER — APPOINTMENT (OUTPATIENT)
Dept: ORTHOPEDIC SURGERY | Facility: CLINIC | Age: 46
End: 2021-02-02
Payer: MEDICAID

## 2021-02-02 VITALS — HEIGHT: 67 IN | BODY MASS INDEX: 25.43 KG/M2 | WEIGHT: 162 LBS

## 2021-02-02 DIAGNOSIS — G56.02 CARPAL TUNNEL SYNDROME, LEFT UPPER LIMB: ICD-10-CM

## 2021-02-02 DIAGNOSIS — G56.01 CARPAL TUNNEL SYNDROME, RIGHT UPPER LIMB: ICD-10-CM

## 2021-02-02 PROCEDURE — 99024 POSTOP FOLLOW-UP VISIT: CPT

## 2021-02-04 ENCOUNTER — TRANSCRIPTION ENCOUNTER (OUTPATIENT)
Age: 46
End: 2021-02-04

## 2021-03-03 ENCOUNTER — TRANSCRIPTION ENCOUNTER (OUTPATIENT)
Age: 46
End: 2021-03-03

## 2021-04-02 ENCOUNTER — TRANSCRIPTION ENCOUNTER (OUTPATIENT)
Age: 46
End: 2021-04-02

## 2021-04-07 ENCOUNTER — APPOINTMENT (OUTPATIENT)
Dept: DISASTER EMERGENCY | Facility: OTHER | Age: 46
End: 2021-04-07
Payer: MEDICAID

## 2021-04-07 PROCEDURE — 0001A: CPT

## 2021-04-28 ENCOUNTER — APPOINTMENT (OUTPATIENT)
Dept: DISASTER EMERGENCY | Facility: OTHER | Age: 46
End: 2021-04-28
Payer: MEDICAID

## 2021-04-28 PROCEDURE — 0002A: CPT

## 2021-05-03 ENCOUNTER — TRANSCRIPTION ENCOUNTER (OUTPATIENT)
Age: 46
End: 2021-05-03

## 2021-05-05 ENCOUNTER — APPOINTMENT (OUTPATIENT)
Dept: INTERNAL MEDICINE | Facility: CLINIC | Age: 46
End: 2021-05-05
Payer: MEDICAID

## 2021-05-05 VITALS
BODY MASS INDEX: 26.53 KG/M2 | SYSTOLIC BLOOD PRESSURE: 115 MMHG | DIASTOLIC BLOOD PRESSURE: 75 MMHG | TEMPERATURE: 98.2 F | OXYGEN SATURATION: 98 % | HEIGHT: 67 IN | HEART RATE: 85 BPM | WEIGHT: 169 LBS

## 2021-05-05 DIAGNOSIS — R53.83 OTHER FATIGUE: ICD-10-CM

## 2021-05-05 PROCEDURE — 99214 OFFICE O/P EST MOD 30 MIN: CPT | Mod: 25

## 2021-05-05 PROCEDURE — 99072 ADDL SUPL MATRL&STAF TM PHE: CPT

## 2021-05-07 RX ORDER — MELOXICAM 15 MG/1
15 TABLET ORAL DAILY
Qty: 30 | Refills: 1 | Status: DISCONTINUED | COMMUNITY
Start: 2021-02-02 | End: 2021-05-07

## 2021-05-09 NOTE — HISTORY OF PRESENT ILLNESS
[de-identified] : presents for f/u visit to review his recent concerns re fatigue, anxiety. he had COVID vaccines x 2 doses few weeks ago. \mundo started a new job at [employer] , but during first couple of shifts he felt anxious, sweaty, and 'had to leave'. he is not sure if this was related to panic/anxiety. \mundo feels more depressed in recent weeks, has less appetite. no weight loss. chronic IBS on diet control, \par \mundo continues on alprazolam , sertraline for chronic anxiety and depression which had been overall improved last year. s/p surgery for meningioma excision in 2020

## 2021-05-09 NOTE — ASSESSMENT
[FreeTextEntry1] : discussed w pt\par \par reviewed his somewhat worsening clinical depression, anxiety. adversely affecting his attempts to start employment, recently started working for [employer] but he had to stop after first shift. counseled extensively on need for quality counseling and possible adjustment of rx. he has not noted any weight loss as he had in the past. \par will increase dose of sertraline to 150 mg qd for now . advised psychiatry evaluation. he has not been consistent w regular f/u in the past. advised on importance of this issue. \par cont other rx. \par \par advised on diet, exercise, consider alternative employment that may be less stressful for him \par \par will check lab testing as below to r/o other causes of current symptoms \par \par RTO 1-2 months for f/u or earlier prn

## 2021-05-09 NOTE — PHYSICAL EXAM
[No Acute Distress] : no acute distress [Normal Voice/Communication] : normal voice/communication [No JVD] : no jugular venous distention [No Lymphadenopathy] : no lymphadenopathy [Normal] : normal rate, regular rhythm, normal S1 and S2 and no murmur heard [Coordination Grossly Intact] : coordination grossly intact [Normal Gait] : normal gait [Speech Grossly Normal] : speech grossly normal [Memory Grossly Normal] : memory grossly normal [Alert and Oriented x3] : oriented to person, place, and time [Normal Insight/Judgement] : insight and judgment were intact [de-identified] : anxious  [de-identified] : depressed affect

## 2021-05-09 NOTE — REVIEW OF SYSTEMS
[Fatigue] : fatigue [Anxiety] : anxiety [Depression] : depression [Negative] : Heme/Lymph [Fever] : no fever [Chills] : no chills [Night Sweats] : no night sweats [Recent Change In Weight] : ~T no recent weight change [Headache] : no headache [Fainting] : no fainting [Confusion] : no confusion [Unsteady Walk] : no ataxia [Memory Loss] : no memory loss [Suicidal] : not suicidal

## 2021-05-10 LAB
ALBUMIN SERPL ELPH-MCNC: 4.7 G/DL
ALP BLD-CCNC: 102 U/L
ALT SERPL-CCNC: 14 U/L
ANION GAP SERPL CALC-SCNC: 11 MMOL/L
AST SERPL-CCNC: 21 U/L
BASOPHILS # BLD AUTO: 0.04 K/UL
BASOPHILS NFR BLD AUTO: 0.4 %
BILIRUB SERPL-MCNC: 0.2 MG/DL
BUN SERPL-MCNC: 13 MG/DL
CALCIUM SERPL-MCNC: 9.5 MG/DL
CHLORIDE SERPL-SCNC: 104 MMOL/L
CO2 SERPL-SCNC: 23 MMOL/L
CREAT SERPL-MCNC: 0.98 MG/DL
EOSINOPHIL # BLD AUTO: 0.19 K/UL
EOSINOPHIL NFR BLD AUTO: 2.1 %
ESTIMATED AVERAGE GLUCOSE: 117 MG/DL
GLUCOSE SERPL-MCNC: 102 MG/DL
HBA1C MFR BLD HPLC: 5.7 %
HCT VFR BLD CALC: 49.8 %
HGB BLD-MCNC: 16.3 G/DL
IMM GRANULOCYTES NFR BLD AUTO: 0.2 %
LYMPHOCYTES # BLD AUTO: 1.84 K/UL
LYMPHOCYTES NFR BLD AUTO: 20.4 %
MAN DIFF?: NORMAL
MCHC RBC-ENTMCNC: 28.3 PG
MCHC RBC-ENTMCNC: 32.7 GM/DL
MCV RBC AUTO: 86.5 FL
MONOCYTES # BLD AUTO: 0.63 K/UL
MONOCYTES NFR BLD AUTO: 7 %
NEUTROPHILS # BLD AUTO: 6.31 K/UL
NEUTROPHILS NFR BLD AUTO: 69.9 %
PLATELET # BLD AUTO: 234 K/UL
POTASSIUM SERPL-SCNC: 4.7 MMOL/L
PROT SERPL-MCNC: 7.5 G/DL
RBC # BLD: 5.76 M/UL
RBC # FLD: 13 %
SODIUM SERPL-SCNC: 139 MMOL/L
T4 FREE SERPL-MCNC: 1 NG/DL
TSH SERPL-ACNC: 0.85 UIU/ML
VIT B12 SERPL-MCNC: 620 PG/ML
WBC # FLD AUTO: 9.03 K/UL

## 2021-05-19 ENCOUNTER — RX RENEWAL (OUTPATIENT)
Age: 46
End: 2021-05-19

## 2021-06-03 ENCOUNTER — TRANSCRIPTION ENCOUNTER (OUTPATIENT)
Age: 46
End: 2021-06-03

## 2021-06-28 ENCOUNTER — TRANSCRIPTION ENCOUNTER (OUTPATIENT)
Age: 46
End: 2021-06-28

## 2021-07-30 ENCOUNTER — TRANSCRIPTION ENCOUNTER (OUTPATIENT)
Age: 46
End: 2021-07-30

## 2021-08-03 ENCOUNTER — NON-APPOINTMENT (OUTPATIENT)
Age: 46
End: 2021-08-03

## 2021-08-03 ENCOUNTER — APPOINTMENT (OUTPATIENT)
Dept: INTERNAL MEDICINE | Facility: CLINIC | Age: 46
End: 2021-08-03
Payer: MEDICAID

## 2021-08-03 ENCOUNTER — LABORATORY RESULT (OUTPATIENT)
Age: 46
End: 2021-08-03

## 2021-08-03 VITALS
WEIGHT: 170 LBS | SYSTOLIC BLOOD PRESSURE: 102 MMHG | HEIGHT: 67.5 IN | BODY MASS INDEX: 26.37 KG/M2 | HEART RATE: 67 BPM | OXYGEN SATURATION: 98 % | TEMPERATURE: 96.98 F | DIASTOLIC BLOOD PRESSURE: 64 MMHG

## 2021-08-03 DIAGNOSIS — Z23 ENCOUNTER FOR IMMUNIZATION: ICD-10-CM

## 2021-08-03 PROCEDURE — 99396 PREV VISIT EST AGE 40-64: CPT | Mod: 25

## 2021-08-03 PROCEDURE — G0444 DEPRESSION SCREEN ANNUAL: CPT | Mod: 59

## 2021-08-03 PROCEDURE — 93000 ELECTROCARDIOGRAM COMPLETE: CPT | Mod: 59

## 2021-08-15 NOTE — ASSESSMENT
[FreeTextEntry1] : discussed w pt \par \par reviewed his recently worsening anxiety, depression. had consultation w new psychiatrist. new rx was added but pt has stopped rx on his own after short period of time. he feels he is much more stable recently. he prefers to cont SSRI and benzodiazepine prn\par \par cont current rx\par \par check routine labs as below\par \par diet, exercise , weight control advised \par \par cont GI f/u w Dr Loredo, Librax prn , likely IBS symptoms, diet control advised \par \par he had COVID vaccines x 2 doses \par \par RTO 6 months for routine f/u or earlier prn if any new concerns

## 2021-08-15 NOTE — PHYSICAL EXAM
[Well Nourished] : well nourished [Well Developed] : well developed [Well-Appearing] : well-appearing [Normal Voice/Communication] : normal voice/communication [Normal Sclera/Conjunctiva] : normal sclera/conjunctiva [PERRL] : pupils equal round and reactive to light [Normal Outer Ear/Nose] : the outer ears and nose were normal in appearance [Normal Oropharynx] : the oropharynx was normal [Normal TMs] : both tympanic membranes were normal [No JVD] : no jugular venous distention [No Lymphadenopathy] : no lymphadenopathy [Supple] : supple [Thyroid Normal, No Nodules] : the thyroid was normal and there were no nodules present [No Respiratory Distress] : no respiratory distress  [No Accessory Muscle Use] : no accessory muscle use [Clear to Auscultation] : lungs were clear to auscultation bilaterally [Normal Rate] : normal rate  [Regular Rhythm] : with a regular rhythm [Normal S1, S2] : normal S1 and S2 [No Murmur] : no murmur heard [No Carotid Bruits] : no carotid bruits [No Abdominal Bruit] : a ~M bruit was not heard ~T in the abdomen [No Varicosities] : no varicosities [Pedal Pulses Present] : the pedal pulses are present [No Edema] : there was no peripheral edema [No Palpable Aorta] : no palpable aorta [No Extremity Clubbing/Cyanosis] : no extremity clubbing/cyanosis [Soft] : abdomen soft [Non Tender] : non-tender [Non-distended] : non-distended [No Masses] : no abdominal mass palpated [No HSM] : no HSM [Normal Bowel Sounds] : normal bowel sounds [Normal Supraclavicular Nodes] : no supraclavicular lymphadenopathy [Normal Posterior Cervical Nodes] : no posterior cervical lymphadenopathy [Normal Anterior Cervical Nodes] : no anterior cervical lymphadenopathy [No CVA Tenderness] : no CVA  tenderness [No Spinal Tenderness] : no spinal tenderness [No Joint Swelling] : no joint swelling [Grossly Normal Strength/Tone] : grossly normal strength/tone [No Rash] : no rash [Coordination Grossly Intact] : coordination grossly intact [No Focal Deficits] : no focal deficits [Normal Gait] : normal gait [Speech Grossly Normal] : speech grossly normal [Normal Affect] : the affect was normal [Alert and Oriented x3] : oriented to person, place, and time [Normal Mood] : the mood was normal [Normal Insight/Judgement] : insight and judgment were intact [No Acute Distress] : no acute distress

## 2021-08-15 NOTE — HISTORY OF PRESENT ILLNESS
[de-identified] : 47 y/o man presents for annual exam. he feels well overall. \par now following w new psychiatrist who has added new rx - lamotrigine 25 mg, Depakote 250 mg. however pt stopped taking rx after few weeks as he feels it was not helping with chronic anxiety disorder. \par he has had trouble maintaining employment. \par \par he had COVID vaccines x 2 doses \par \par IBS-like symptoms controlled w diet and occasional use of Librax prn, following w Dr Loredo \par chronic anxiety/panic disorder on SSRI and alprazolam, taking as prescribed \par \par s/p cranial surgery for excision of benign meningioma in 10/19 which was successful, no complications

## 2021-08-31 ENCOUNTER — TRANSCRIPTION ENCOUNTER (OUTPATIENT)
Age: 46
End: 2021-08-31

## 2021-09-13 ENCOUNTER — NON-APPOINTMENT (OUTPATIENT)
Age: 46
End: 2021-09-13

## 2021-09-17 ENCOUNTER — APPOINTMENT (OUTPATIENT)
Dept: GASTROENTEROLOGY | Facility: CLINIC | Age: 46
End: 2021-09-17
Payer: MEDICAID

## 2021-09-17 VITALS
OXYGEN SATURATION: 98 % | WEIGHT: 167 LBS | DIASTOLIC BLOOD PRESSURE: 70 MMHG | TEMPERATURE: 206.78 F | HEART RATE: 63 BPM | BODY MASS INDEX: 25.91 KG/M2 | HEIGHT: 67.5 IN | SYSTOLIC BLOOD PRESSURE: 100 MMHG

## 2021-09-17 DIAGNOSIS — R10.9 UNSPECIFIED ABDOMINAL PAIN: ICD-10-CM

## 2021-09-17 DIAGNOSIS — R19.8 OTHER SPECIFIED SYMPTOMS AND SIGNS INVOLVING THE DIGESTIVE SYSTEM AND ABDOMEN: ICD-10-CM

## 2021-09-17 DIAGNOSIS — R63.0 ANOREXIA: ICD-10-CM

## 2021-09-17 DIAGNOSIS — R07.89 OTHER CHEST PAIN: ICD-10-CM

## 2021-09-17 PROCEDURE — 99214 OFFICE O/P EST MOD 30 MIN: CPT

## 2021-09-17 RX ORDER — CHLORDIAZEPOXIDE HYDROCHLORIDE AND CLIDINIUM BROMIDE 5; 2.5 MG/1; MG/1
5-2.5 CAPSULE, GELATIN COATED ORAL
Qty: 90 | Refills: 0 | Status: DISCONTINUED | COMMUNITY
Start: 2019-05-09 | End: 2021-09-17

## 2021-09-17 NOTE — HISTORY OF PRESENT ILLNESS
[de-identified] : Dr. Castro takes care of this very pleasant 46-year-old gentleman\par \par Long history of irritable bowel syndrome\par \par At one point he seemed to have some benefit with Librax\par \par Is not finding much benefit at all with it now and is since discontinued it\par \par He has epigastric discomfort and bloating and discomfort sometimes going into the chest\par \par He frequently wakes up with it and it can last hours\par \par He has very severe nausea\par \par He feels like he cannot vomit\par \par There is no dysphagia or odynophagia\par \par He has heartburn, Pepcid does not seem to be working very well\par \par He is on Zoloft 100 mg a day under the care of a psychiatrist\par \par His symptoms are significant enough that he is finding it very hard to seek employment, maintain employment\par \par He has had a very extensive work-up in the past with upper endoscopy and biopsies negative, colonoscopy negative, capsule endoscopy negative, CAT scan and MRI negative, ultrasound negative but this was 2 or 3 years ago

## 2021-09-17 NOTE — ASSESSMENT
[FreeTextEntry1] : Impression\par \par Significant severe apparently disabling irritable bowel syndrome\par \par With a host of symptoms including abdominal discomfort and bloating, atypical chest discomfort and inability to vomit, severe nausea, frequent and sometimes unpredictable loose bowel movements\par \par Extensive work-up in the past but this was 2 to 3 years ago basically normal\par \par Initially did well with Librax, no longer working\par \par GERD\par \par Pepcid no longer working\par \par On Zoloft 100 mg a day\par \par Suggest\par \par Nutrition consult\par \par Lab work\par \par Stool studies\par \par Ultrasound\par \par Repeat upper endoscopy\par \par Zofran 4 mg up to 2 times a day for nausea\par \par Trial of pantoprazole 40 mg a day\par \par Small bowel bacterial overgrowth breath testing\par \par Call or follow-up sooner as needed\par \par I told the patient that I had be happy to help him with disability paperwork if he wants to go that way or that I can write a letter if he can find a job asking the employer to provide accommodations for the patient

## 2021-09-17 NOTE — REASON FOR VISIT
[FreeTextEntry1] : Irritable bowel, seems to be doing worse lately with abdominal discomfort, atypical chest discomfort very severe nausea and continued frequent loose bowel movements, making it very difficult for him to work, seek employment to maintain employment

## 2021-09-17 NOTE — PHYSICAL EXAM
[General Appearance - In No Acute Distress] : in no acute distress [General Appearance - Alert] : alert [General Appearance - Well Nourished] : well nourished [General Appearance - Well-Appearing] : healthy appearing [General Appearance - Well Developed] : well developed [Sclera] : the sclera and conjunctiva were normal [Neck Appearance] : the appearance of the neck was normal [Neck Cervical Mass (___cm)] : no neck mass was observed [Jugular Venous Distention Increased] : there was no jugular-venous distention [Auscultation Breath Sounds / Voice Sounds] : lungs were clear to auscultation bilaterally [Heart Rate And Rhythm] : heart rate was normal and rhythm regular [Heart Sounds] : normal S1 and S2 [Heart Sounds Gallop] : no gallops [Murmurs] : no murmurs [Heart Sounds Pericardial Friction Rub] : no pericardial rub [Full Pulse] : the pedal pulses are present [Edema] : there was no peripheral edema [Bowel Sounds] : normal bowel sounds [Abdomen Soft] : soft [Abdomen Tenderness] : non-tender [Abdomen Mass (___ Cm)] : no abdominal mass palpated [Normal Sphincter Tone] : normal sphincter tone [No Rectal Mass] : no rectal mass [External Hemorrhoid] : external hemorrhoids [Occult Blood Positive] : stool was negative for occult blood [Cervical Lymph Nodes Enlarged Posterior Bilaterally] : posterior cervical [Cervical Lymph Nodes Enlarged Anterior Bilaterally] : anterior cervical [Supraclavicular Lymph Nodes Enlarged Bilaterally] : supraclavicular [Axillary Lymph Nodes Enlarged Bilaterally] : axillary [Femoral Lymph Nodes Enlarged Bilaterally] : femoral [Inguinal Lymph Nodes Enlarged Bilaterally] : inguinal [No CVA Tenderness] : no ~M costovertebral angle tenderness [No Spinal Tenderness] : no spinal tenderness [Abnormal Walk] : normal gait [Nail Clubbing] : no clubbing  or cyanosis of the fingernails [Musculoskeletal - Swelling] : no joint swelling seen [Motor Tone] : muscle strength and tone were normal [Skin Turgor] : normal skin turgor [Skin Color & Pigmentation] : normal skin color and pigmentation [] : no rash [No Focal Deficits] : no focal deficits [Oriented To Time, Place, And Person] : oriented to person, place, and time [Impaired Insight] : insight and judgment were intact [Affect] : the affect was normal

## 2021-09-19 LAB
ALBUMIN SERPL ELPH-MCNC: 4.7 G/DL
ALP BLD-CCNC: 99 U/L
ALT SERPL-CCNC: 15 U/L
ANION GAP SERPL CALC-SCNC: 11 MMOL/L
AST SERPL-CCNC: 17 U/L
BASOPHILS # BLD AUTO: 0.03 K/UL
BASOPHILS NFR BLD AUTO: 0.3 %
BILIRUB SERPL-MCNC: 0.2 MG/DL
BUN SERPL-MCNC: 15 MG/DL
CALCIUM SERPL-MCNC: 9.9 MG/DL
CHLORIDE SERPL-SCNC: 106 MMOL/L
CO2 SERPL-SCNC: 24 MMOL/L
CREAT SERPL-MCNC: 0.94 MG/DL
CRP SERPL-MCNC: <3 MG/L
EOSINOPHIL # BLD AUTO: 0.1 K/UL
EOSINOPHIL NFR BLD AUTO: 1.2 %
FERRITIN SERPL-MCNC: 266 NG/ML
FOLATE SERPL-MCNC: 6.7 NG/ML
GLIADIN IGA SER QL: <5 UNITS
GLIADIN IGG SER QL: <5 UNITS
GLIADIN PEPTIDE IGA SER-ACNC: NEGATIVE
GLIADIN PEPTIDE IGG SER-ACNC: NEGATIVE
GLUCOSE SERPL-MCNC: 97 MG/DL
HCT VFR BLD CALC: 49.6 %
HGB BLD-MCNC: 16.4 G/DL
IGA SER QL IEP: 195 MG/DL
IMM GRANULOCYTES NFR BLD AUTO: 0.3 %
IRON SATN MFR SERPL: 21 %
IRON SERPL-MCNC: 71 UG/DL
LYMPHOCYTES # BLD AUTO: 1.66 K/UL
LYMPHOCYTES NFR BLD AUTO: 19.3 %
MAN DIFF?: NORMAL
MCHC RBC-ENTMCNC: 29.1 PG
MCHC RBC-ENTMCNC: 33.1 GM/DL
MCV RBC AUTO: 87.9 FL
MONOCYTES # BLD AUTO: 0.58 K/UL
MONOCYTES NFR BLD AUTO: 6.8 %
NEUTROPHILS # BLD AUTO: 6.19 K/UL
NEUTROPHILS NFR BLD AUTO: 72.1 %
PLATELET # BLD AUTO: 199 K/UL
POTASSIUM SERPL-SCNC: 4.5 MMOL/L
PROT SERPL-MCNC: 7.3 G/DL
RBC # BLD: 5.64 M/UL
RBC # FLD: 13.2 %
SODIUM SERPL-SCNC: 141 MMOL/L
TIBC SERPL-MCNC: 334 UG/DL
TTG IGA SER IA-ACNC: <1.2 U/ML
TTG IGA SER-ACNC: NEGATIVE
TTG IGG SER IA-ACNC: 6.2 U/ML
TTG IGG SER IA-ACNC: ABNORMAL
UIBC SERPL-MCNC: 264 UG/DL
VIT B12 SERPL-MCNC: 501 PG/ML
WBC # FLD AUTO: 8.59 K/UL

## 2021-09-20 ENCOUNTER — NON-APPOINTMENT (OUTPATIENT)
Age: 46
End: 2021-09-20

## 2021-09-21 LAB
ENDOMYSIUM IGA SER QL: NEGATIVE
ENDOMYSIUM IGA TITR SER: NORMAL

## 2021-09-22 LAB — BETA CAROTENE: 9 UG/DL

## 2021-09-23 LAB
ALBUMIN SERPL ELPH-MCNC: 4.8 G/DL
ALP BLD-CCNC: 87 U/L
ALT SERPL-CCNC: 11 U/L
ANION GAP SERPL CALC-SCNC: 11 MMOL/L
APPEARANCE: CLEAR
AST SERPL-CCNC: 18 U/L
BASOPHILS # BLD AUTO: 0.04 K/UL
BASOPHILS NFR BLD AUTO: 0.5 %
BILIRUB SERPL-MCNC: 0.2 MG/DL
BILIRUBIN URINE: NEGATIVE
BLOOD URINE: NEGATIVE
BUN SERPL-MCNC: 12 MG/DL
CALCIUM SERPL-MCNC: 9.7 MG/DL
CHLORIDE SERPL-SCNC: 103 MMOL/L
CHOLEST SERPL-MCNC: 266 MG/DL
CK SERPL-CCNC: 132 U/L
CO2 SERPL-SCNC: 25 MMOL/L
COLOR: YELLOW
CREAT SERPL-MCNC: 0.95 MG/DL
EOSINOPHIL # BLD AUTO: 0.3 K/UL
EOSINOPHIL NFR BLD AUTO: 4 %
ESTIMATED AVERAGE GLUCOSE: 114 MG/DL
GLUCOSE QUALITATIVE U: NEGATIVE
GLUCOSE SERPL-MCNC: 99 MG/DL
HBA1C MFR BLD HPLC: 5.6 %
HCT VFR BLD CALC: 48 %
HDLC SERPL-MCNC: 32 MG/DL
HGB BLD-MCNC: 16.4 G/DL
IMM GRANULOCYTES NFR BLD AUTO: 0.3 %
KETONES URINE: NEGATIVE
LDLC SERPL CALC-MCNC: 178 MG/DL
LEUKOCYTE ESTERASE URINE: ABNORMAL
LYMPHOCYTES # BLD AUTO: 2.14 K/UL
LYMPHOCYTES NFR BLD AUTO: 28.6 %
MAN DIFF?: NORMAL
MCHC RBC-ENTMCNC: 29.2 PG
MCHC RBC-ENTMCNC: 34.2 GM/DL
MCV RBC AUTO: 85.4 FL
MONOCYTES # BLD AUTO: 0.63 K/UL
MONOCYTES NFR BLD AUTO: 8.4 %
NEUTROPHILS # BLD AUTO: 4.35 K/UL
NEUTROPHILS NFR BLD AUTO: 58.2 %
NITRITE URINE: NEGATIVE
NONHDLC SERPL-MCNC: 234 MG/DL
PH URINE: 6
PLATELET # BLD AUTO: 187 K/UL
POTASSIUM SERPL-SCNC: 4.7 MMOL/L
PROT SERPL-MCNC: 7.4 G/DL
PROTEIN URINE: NORMAL
RBC # BLD: 5.62 M/UL
RBC # FLD: 13.5 %
SODIUM SERPL-SCNC: 139 MMOL/L
SPECIFIC GRAVITY URINE: 1.02
TRIGL SERPL-MCNC: 281 MG/DL
TSH SERPL-ACNC: 1.62 UIU/ML
UROBILINOGEN URINE: NORMAL
WBC # FLD AUTO: 7.48 K/UL

## 2021-09-24 ENCOUNTER — APPOINTMENT (OUTPATIENT)
Dept: ULTRASOUND IMAGING | Facility: CLINIC | Age: 46
End: 2021-09-24
Payer: MEDICAID

## 2021-09-24 ENCOUNTER — NON-APPOINTMENT (OUTPATIENT)
Age: 46
End: 2021-09-24

## 2021-09-24 ENCOUNTER — OUTPATIENT (OUTPATIENT)
Dept: OUTPATIENT SERVICES | Facility: HOSPITAL | Age: 46
LOS: 1 days | End: 2021-09-24
Payer: MEDICAID

## 2021-09-24 DIAGNOSIS — Z98.890 OTHER SPECIFIED POSTPROCEDURAL STATES: Chronic | ICD-10-CM

## 2021-09-24 DIAGNOSIS — R11.0 NAUSEA: ICD-10-CM

## 2021-09-24 DIAGNOSIS — Z00.8 ENCOUNTER FOR OTHER GENERAL EXAMINATION: ICD-10-CM

## 2021-09-24 LAB
C DIFF TOX GENS STL QL NAA+PROBE: NORMAL
CDIFF BY PCR: NOT DETECTED
G LAMBLIA AG STL QL: NORMAL
GI PCR PANEL, STOOL: ABNORMAL

## 2021-09-24 PROCEDURE — 76700 US EXAM ABDOM COMPLETE: CPT | Mod: 26

## 2021-09-24 PROCEDURE — 76700 US EXAM ABDOM COMPLETE: CPT

## 2021-09-26 LAB — BACTERIA STL CULT: NORMAL

## 2021-09-27 ENCOUNTER — APPOINTMENT (OUTPATIENT)
Dept: MRI IMAGING | Facility: CLINIC | Age: 46
End: 2021-09-27

## 2021-09-27 ENCOUNTER — APPOINTMENT (OUTPATIENT)
Dept: GASTROENTEROLOGY | Facility: CLINIC | Age: 46
End: 2021-09-27
Payer: SELF-PAY

## 2021-09-27 LAB — DEPRECATED O AND P PREP STL: NORMAL

## 2021-09-27 PROCEDURE — 97802 MEDICAL NUTRITION INDIV IN: CPT

## 2021-09-28 LAB
C DIFF TOX B STL QL CT TISS CULT: NORMAL
Lab: NORMAL

## 2021-09-29 LAB
FAT STL QN: NORMAL
FAT STL QN: NORMAL

## 2021-09-30 ENCOUNTER — TRANSCRIPTION ENCOUNTER (OUTPATIENT)
Age: 46
End: 2021-09-30

## 2021-09-30 LAB — CALPROTECTIN FECAL: 55 UG/G

## 2021-10-04 ENCOUNTER — OUTPATIENT (OUTPATIENT)
Dept: OUTPATIENT SERVICES | Facility: HOSPITAL | Age: 46
LOS: 1 days | End: 2021-10-04
Payer: MEDICAID

## 2021-10-04 ENCOUNTER — APPOINTMENT (OUTPATIENT)
Dept: MRI IMAGING | Facility: CLINIC | Age: 46
End: 2021-10-04
Payer: MEDICAID

## 2021-10-04 DIAGNOSIS — Z00.8 ENCOUNTER FOR OTHER GENERAL EXAMINATION: ICD-10-CM

## 2021-10-04 DIAGNOSIS — Z98.890 OTHER SPECIFIED POSTPROCEDURAL STATES: Chronic | ICD-10-CM

## 2021-10-04 PROCEDURE — 70553 MRI BRAIN STEM W/O & W/DYE: CPT

## 2021-10-04 PROCEDURE — A9585: CPT

## 2021-10-04 PROCEDURE — 70553 MRI BRAIN STEM W/O & W/DYE: CPT | Mod: 26

## 2021-10-08 ENCOUNTER — NON-APPOINTMENT (OUTPATIENT)
Age: 46
End: 2021-10-08

## 2021-10-12 ENCOUNTER — NON-APPOINTMENT (OUTPATIENT)
Age: 46
End: 2021-10-12

## 2021-10-13 ENCOUNTER — APPOINTMENT (OUTPATIENT)
Dept: SPINE | Facility: CLINIC | Age: 46
End: 2021-10-13
Payer: MEDICAID

## 2021-10-13 VITALS
DIASTOLIC BLOOD PRESSURE: 68 MMHG | WEIGHT: 167 LBS | OXYGEN SATURATION: 94 % | BODY MASS INDEX: 25.91 KG/M2 | SYSTOLIC BLOOD PRESSURE: 103 MMHG | HEART RATE: 56 BPM | HEIGHT: 67.5 IN

## 2021-10-13 PROCEDURE — 99213 OFFICE O/P EST LOW 20 MIN: CPT

## 2021-10-28 ENCOUNTER — APPOINTMENT (OUTPATIENT)
Dept: GASTROENTEROLOGY | Facility: CLINIC | Age: 46
End: 2021-10-28

## 2021-10-29 ENCOUNTER — TRANSCRIPTION ENCOUNTER (OUTPATIENT)
Age: 46
End: 2021-10-29

## 2021-10-30 LAB — SARS-COV-2 N GENE NPH QL NAA+PROBE: NOT DETECTED

## 2021-11-01 ENCOUNTER — APPOINTMENT (OUTPATIENT)
Dept: GASTROENTEROLOGY | Facility: AMBULATORY MEDICAL SERVICES | Age: 46
End: 2021-11-01
Payer: MEDICAID

## 2021-11-01 PROCEDURE — 43239 EGD BIOPSY SINGLE/MULTIPLE: CPT

## 2021-11-08 ENCOUNTER — APPOINTMENT (OUTPATIENT)
Dept: GASTROENTEROLOGY | Facility: CLINIC | Age: 46
End: 2021-11-08

## 2021-11-09 ENCOUNTER — NON-APPOINTMENT (OUTPATIENT)
Age: 46
End: 2021-11-09

## 2021-11-10 ENCOUNTER — APPOINTMENT (OUTPATIENT)
Dept: GASTROENTEROLOGY | Facility: CLINIC | Age: 46
End: 2021-11-10

## 2021-11-16 NOTE — ED PROVIDER NOTE - CONTEXT
16-Nov-2021 19:39 **ATTENDING ADDENDUM (Dr. Rai Finch): procedure and evaluation at primary care physician/provider's office (see notation above).

## 2021-11-30 ENCOUNTER — TRANSCRIPTION ENCOUNTER (OUTPATIENT)
Age: 46
End: 2021-11-30

## 2021-12-27 ENCOUNTER — TRANSCRIPTION ENCOUNTER (OUTPATIENT)
Age: 46
End: 2021-12-27

## 2022-01-27 ENCOUNTER — TRANSCRIPTION ENCOUNTER (OUTPATIENT)
Age: 47
End: 2022-01-27

## 2022-02-25 ENCOUNTER — TRANSCRIPTION ENCOUNTER (OUTPATIENT)
Age: 47
End: 2022-02-25

## 2022-03-12 ENCOUNTER — NON-APPOINTMENT (OUTPATIENT)
Age: 47
End: 2022-03-12

## 2022-03-12 ENCOUNTER — RX RENEWAL (OUTPATIENT)
Age: 47
End: 2022-03-12

## 2022-03-25 ENCOUNTER — TRANSCRIPTION ENCOUNTER (OUTPATIENT)
Age: 47
End: 2022-03-25

## 2022-04-11 PROBLEM — Z11.59 SCREENING FOR VIRAL DISEASE: Status: ACTIVE | Noted: 2020-07-16

## 2022-04-25 ENCOUNTER — RX RENEWAL (OUTPATIENT)
Age: 47
End: 2022-04-25

## 2022-04-26 ENCOUNTER — TRANSCRIPTION ENCOUNTER (OUTPATIENT)
Age: 47
End: 2022-04-26

## 2022-05-05 ENCOUNTER — APPOINTMENT (OUTPATIENT)
Dept: INTERNAL MEDICINE | Facility: CLINIC | Age: 47
End: 2022-05-05
Payer: MEDICAID

## 2022-05-05 VITALS
DIASTOLIC BLOOD PRESSURE: 64 MMHG | WEIGHT: 170 LBS | HEART RATE: 87 BPM | SYSTOLIC BLOOD PRESSURE: 110 MMHG | BODY MASS INDEX: 26.68 KG/M2 | TEMPERATURE: 98.7 F | HEIGHT: 67 IN | OXYGEN SATURATION: 97 %

## 2022-05-05 DIAGNOSIS — K52.9 NONINFECTIVE GASTROENTERITIS AND COLITIS, UNSPECIFIED: ICD-10-CM

## 2022-05-05 PROCEDURE — 99214 OFFICE O/P EST MOD 30 MIN: CPT

## 2022-05-05 NOTE — ASSESSMENT
[FreeTextEntry1] : discussed  w pt \par \par reviewed his recent progress. he is trying to maintain employment, a significant step for him. working as a . increase in his stress and anxiety. he is using alprazolam more frequently. he is requesting increase in dosage, but will prefer to give him more supply for now to take tid prn for anxiety. advised on all risks. avoid alcohol. \par again advised psychiatry consult to review rx options . cont f/u w therapist regularly. \par counseled. \par will suggest increase in dose of sertraline to 150 mg qd which was advised in the past but he did not implement this. \par \par f/u w Dr Loredo re chronic loose stools, reviewed prior GI work up . he will make appt for f/u visit \par \par RTO 3 months for full physical exam w labs or earlier prn if any new concerns

## 2022-05-05 NOTE — REVIEW OF SYSTEMS
[Fatigue] : fatigue [Anxiety] : anxiety [Negative] : Heme/Lymph [Fever] : no fever [Chills] : no chills [Suicidal] : not suicidal [Depression] : no depression

## 2022-05-05 NOTE — HISTORY OF PRESENT ILLNESS
[de-identified] : presents for f/u visit for update on his chronic anxiety, stress , current medications. he has some significant family dynamic problems w his brother and mother all living in the same home. they are trying to work this out. \par chronic anxiety condition , treated w alprazolam 0.5 mg bid taken regularly. taking sertraline as prescribed. sees his therapist weekly, mildly helpful. he has still not set up with a psychiatrist to review rx. \par he continues to use marijuana regularly. \par he is requesting increase in alprazolam dosing as he is using it more frequently - he recently started a new job as a  for a [email or web address] company working few times for week. this is very stressful for him. \par \par ongoing issues w loose and frequent bowel movements, likely related to IBS. workup with Dr Loredo GI extensively, and he is due for f/u visit.

## 2022-05-05 NOTE — PHYSICAL EXAM
[No Acute Distress] : no acute distress [Normal Voice/Communication] : normal voice/communication [No Lymphadenopathy] : no lymphadenopathy [Normal] : normal rate, regular rhythm, normal S1 and S2 and no murmur heard [Normal Gait] : normal gait [Normal Affect] : the affect was normal [Alert and Oriented x3] : oriented to person, place, and time [Normal Mood] : the mood was normal [Normal Insight/Judgement] : insight and judgment were intact

## 2022-05-17 ENCOUNTER — TRANSCRIPTION ENCOUNTER (OUTPATIENT)
Age: 47
End: 2022-05-17

## 2022-05-24 ENCOUNTER — TRANSCRIPTION ENCOUNTER (OUTPATIENT)
Age: 47
End: 2022-05-24

## 2022-05-24 ENCOUNTER — APPOINTMENT (OUTPATIENT)
Dept: GASTROENTEROLOGY | Facility: CLINIC | Age: 47
End: 2022-05-24
Payer: MEDICAID

## 2022-05-24 VITALS
SYSTOLIC BLOOD PRESSURE: 100 MMHG | OXYGEN SATURATION: 98 % | HEART RATE: 61 BPM | DIASTOLIC BLOOD PRESSURE: 70 MMHG | WEIGHT: 166 LBS | TEMPERATURE: 96.9 F | BODY MASS INDEX: 26.06 KG/M2 | HEIGHT: 67 IN

## 2022-05-24 DIAGNOSIS — Z01.818 ENCOUNTER FOR OTHER PREPROCEDURAL EXAMINATION: ICD-10-CM

## 2022-05-24 DIAGNOSIS — Z11.52 ENCOUNTER FOR SCREENING FOR COVID-19: ICD-10-CM

## 2022-05-24 DIAGNOSIS — K62.5 HEMORRHAGE OF ANUS AND RECTUM: ICD-10-CM

## 2022-05-24 PROCEDURE — 99214 OFFICE O/P EST MOD 30 MIN: CPT

## 2022-05-24 RX ORDER — ONDANSETRON 4 MG/1
4 TABLET ORAL TWICE DAILY
Qty: 60 | Refills: 1 | Status: DISCONTINUED | COMMUNITY
Start: 2021-09-17 | End: 2022-05-24

## 2022-05-24 RX ORDER — FAMOTIDINE 40 MG/1
40 TABLET, FILM COATED ORAL DAILY
Qty: 30 | Refills: 5 | Status: DISCONTINUED | COMMUNITY
Start: 2020-07-06 | End: 2022-05-24

## 2022-05-24 NOTE — PHYSICAL EXAM
[General Appearance - Alert] : alert [General Appearance - In No Acute Distress] : in no acute distress [General Appearance - Well Nourished] : well nourished [General Appearance - Well Developed] : well developed [General Appearance - Well-Appearing] : healthy appearing [Sclera] : the sclera and conjunctiva were normal [Neck Appearance] : the appearance of the neck was normal [Neck Cervical Mass (___cm)] : no neck mass was observed [Jugular Venous Distention Increased] : there was no jugular-venous distention [Auscultation Breath Sounds / Voice Sounds] : lungs were clear to auscultation bilaterally [Heart Rate And Rhythm] : heart rate was normal and rhythm regular [Heart Sounds] : normal S1 and S2 [Heart Sounds Gallop] : no gallops [Murmurs] : no murmurs [Heart Sounds Pericardial Friction Rub] : no pericardial rub [Full Pulse] : the pedal pulses are present [Edema] : there was no peripheral edema [Bowel Sounds] : normal bowel sounds [Abdomen Soft] : soft [Abdomen Tenderness] : non-tender [Abdomen Mass (___ Cm)] : no abdominal mass palpated [Normal Sphincter Tone] : normal sphincter tone [No Rectal Mass] : no rectal mass [External Hemorrhoid] : external hemorrhoids [Occult Blood Positive] : stool was negative for occult blood [FreeTextEntry1] : External hemorrhoids, mildly tender on the left, possible small anal fissure on the left, no mass, normal anal sphincter tone, no blood, brown stool [Cervical Lymph Nodes Enlarged Posterior Bilaterally] : posterior cervical [Cervical Lymph Nodes Enlarged Anterior Bilaterally] : anterior cervical [Supraclavicular Lymph Nodes Enlarged Bilaterally] : supraclavicular [Axillary Lymph Nodes Enlarged Bilaterally] : axillary [Femoral Lymph Nodes Enlarged Bilaterally] : femoral [Inguinal Lymph Nodes Enlarged Bilaterally] : inguinal [No CVA Tenderness] : no ~M costovertebral angle tenderness [No Spinal Tenderness] : no spinal tenderness [Abnormal Walk] : normal gait [Nail Clubbing] : no clubbing  or cyanosis of the fingernails [Musculoskeletal - Swelling] : no joint swelling seen [Motor Tone] : muscle strength and tone were normal [Skin Color & Pigmentation] : normal skin color and pigmentation [Skin Turgor] : normal skin turgor [] : no rash [No Focal Deficits] : no focal deficits [Oriented To Time, Place, And Person] : oriented to person, place, and time [Impaired Insight] : insight and judgment were intact [Affect] : the affect was normal

## 2022-05-24 NOTE — ASSESSMENT
[FreeTextEntry1] : Impression\par \par Anal discomfort\par \par Hemorrhoids\par \par Possible anal fissure\par \par Rectal bleeding\par \par Request for colon cancer screening\par \par Suggest\par \par Sitz bath\par \par Topical lidocaine\par \par Preparation H\par \par See colorectal surgeon\par \par Colonoscopy with me\par \par Agree with colonoscopy\par \par Suprep\par \par The laxative, or its risks benefits and alternatives have been thoroughly reviewed with the patient in great detail. The laxative instructions have been reviewed in great detail with the patient.\par \par Risks/benefits:\par The procedure, the risks and benefits and alternatives have been reviewed in great detail with the patient.  Risks including, but not limited to sedation such as cardiac and pulmonary compromise, the procedure itself such as bleeding requiring hospitalization, transfusion, surgery, temporary or permanent colostomy.  Perforation or puncture of the requiring hospitalization, surgery, temporary colostomy.\par It has been explained to the patient that though colonoscopy is thought to be the best screening exam for colon cancer and polyps, no screening exam can find all colon polyps or cancers.  \par The patient expresses understanding of the procedure and consents to undergoing the procedure.\par

## 2022-05-24 NOTE — HISTORY OF PRESENT ILLNESS
[de-identified] : Dr. Castro takes care of this pleasant 46-year-old gentleman\par \par 2 weeks of fairly significant anal rectal discomfort, blood per rectum\par \par Worse at the end of the day after standing on his feet all day\par \par Worse with bowel movement\par \par Stools are brown\par \par No fever or chills\par \par Last colonoscopy more than 4 years ago\par \par Heartburn is doing well on pantoprazole alone\par \par Pepcid cause constipation

## 2022-05-26 ENCOUNTER — TRANSCRIPTION ENCOUNTER (OUTPATIENT)
Age: 47
End: 2022-05-26

## 2022-05-31 ENCOUNTER — TRANSCRIPTION ENCOUNTER (OUTPATIENT)
Age: 47
End: 2022-05-31

## 2022-06-14 ENCOUNTER — APPOINTMENT (OUTPATIENT)
Dept: COLORECTAL SURGERY | Facility: CLINIC | Age: 47
End: 2022-06-14

## 2022-06-14 ENCOUNTER — NON-APPOINTMENT (OUTPATIENT)
Age: 47
End: 2022-06-14

## 2022-06-14 VITALS
BODY MASS INDEX: 25.16 KG/M2 | TEMPERATURE: 96 F | DIASTOLIC BLOOD PRESSURE: 73 MMHG | RESPIRATION RATE: 14 BRPM | OXYGEN SATURATION: 99 % | SYSTOLIC BLOOD PRESSURE: 108 MMHG | HEART RATE: 62 BPM | HEIGHT: 68 IN | WEIGHT: 166 LBS

## 2022-06-14 DIAGNOSIS — K62.89 OTHER SPECIFIED DISEASES OF ANUS AND RECTUM: ICD-10-CM

## 2022-06-14 PROCEDURE — 99204 OFFICE O/P NEW MOD 45 MIN: CPT | Mod: 25

## 2022-06-14 PROCEDURE — 46600 DIAGNOSTIC ANOSCOPY SPX: CPT

## 2022-06-14 NOTE — HISTORY OF PRESENT ILLNESS
[FreeTextEntry1] : Consultation requested by Dr. Castro for prolapsing bleeding hemorrhoids. 47-year-old male With long-standing history of prolapsing bleeding internal and external hemorrhoids. Symptoms have been present for many years and worsening. Bleeding as bright red with bowel movements. Has occasional pain.

## 2022-06-14 NOTE — REVIEW OF SYSTEMS
[As Noted in HPI] : as noted in HPI [Constipation] : constipation [Negative] : Heme/Lymph [FreeTextEntry7] : Bleeding hemorrhoids

## 2022-06-14 NOTE — CONSULT LETTER
[Dear  ___] : Dear  [unfilled], [Consult Letter:] : I had the pleasure of evaluating your patient, [unfilled]. [Please see my note below.] : Please see my note below. [Consult Closing:] : Thank you very much for allowing me to participate in the care of this patient.  If you have any questions, please do not hesitate to contact me. [Sincerely,] : Sincerely, [FreeTextEntry3] : Rinku Lynch M.D. FACS, FASCRS

## 2022-06-14 NOTE — PHYSICAL EXAM
[Abdomen Masses] : No abdominal masses [Abdomen Tenderness] : ~T No ~M abdominal tenderness [Tender] : nontender [Normal rectal exam] : exam was normal [Normal] : was normal [None] : there was no rectal mass  [JVD] : no jugular venous distention  [Normal Breath Sounds] : Normal breath sounds [Normal Heart Sounds] : normal heart sounds [Normal Rate and Rhythm] : normal rate and rhythm [No Rash or Lesion] : No rash or lesion [Alert] : alert [Oriented to Person] : oriented to person [Oriented to Place] : oriented to place [Oriented to Time] : oriented to time [Calm] : calm [de-identified] : Looks well in no distress, of stated age. [de-identified] : pupils equal reactive to light normocephalic atraumatic. [de-identified] : moves all 4 extremities appropriately with 5 over 5 strength

## 2022-06-14 NOTE — PROCEDURE
[FreeTextEntry1] : Anoscopy was performed, demonstrating Extensive grade 3 internal/external hemorrhoids.

## 2022-06-14 NOTE — ASSESSMENT
[FreeTextEntry1] : 47-year-old male with extensive internal and external hemorrhoids with bleeding. Recommend hemorrhoidectomy. Risk and benefits of surgery have been discussed including bleeding infection recurrent incontinence fissures fistulas need for reoperations

## 2022-06-16 ENCOUNTER — TRANSCRIPTION ENCOUNTER (OUTPATIENT)
Age: 47
End: 2022-06-16

## 2022-06-16 ENCOUNTER — RX RENEWAL (OUTPATIENT)
Age: 47
End: 2022-06-16

## 2022-06-28 ENCOUNTER — TRANSCRIPTION ENCOUNTER (OUTPATIENT)
Age: 47
End: 2022-06-28

## 2022-07-06 ENCOUNTER — TRANSCRIPTION ENCOUNTER (OUTPATIENT)
Age: 47
End: 2022-07-06

## 2022-07-19 ENCOUNTER — TRANSCRIPTION ENCOUNTER (OUTPATIENT)
Age: 47
End: 2022-07-19

## 2022-07-21 ENCOUNTER — RESULT REVIEW (OUTPATIENT)
Age: 47
End: 2022-07-21

## 2022-07-21 ENCOUNTER — OUTPATIENT (OUTPATIENT)
Dept: OUTPATIENT SERVICES | Facility: HOSPITAL | Age: 47
LOS: 1 days | End: 2022-07-21
Payer: MEDICAID

## 2022-07-21 VITALS
OXYGEN SATURATION: 99 % | DIASTOLIC BLOOD PRESSURE: 57 MMHG | HEART RATE: 66 BPM | WEIGHT: 160.94 LBS | HEIGHT: 68 IN | RESPIRATION RATE: 17 BRPM | SYSTOLIC BLOOD PRESSURE: 103 MMHG | TEMPERATURE: 98 F

## 2022-07-21 DIAGNOSIS — Z98.890 OTHER SPECIFIED POSTPROCEDURAL STATES: Chronic | ICD-10-CM

## 2022-07-21 DIAGNOSIS — K64.8 OTHER HEMORRHOIDS: ICD-10-CM

## 2022-07-21 DIAGNOSIS — Z01.818 ENCOUNTER FOR OTHER PREPROCEDURAL EXAMINATION: ICD-10-CM

## 2022-07-21 LAB
ANION GAP SERPL CALC-SCNC: 1 MMOL/L — LOW (ref 5–17)
APTT BLD: 36.6 SEC — HIGH (ref 27.5–35.5)
BASOPHILS # BLD AUTO: 0.03 K/UL — SIGNIFICANT CHANGE UP (ref 0–0.2)
BASOPHILS NFR BLD AUTO: 0.4 % — SIGNIFICANT CHANGE UP (ref 0–2)
BUN SERPL-MCNC: 19 MG/DL — SIGNIFICANT CHANGE UP (ref 7–23)
CALCIUM SERPL-MCNC: 9.6 MG/DL — SIGNIFICANT CHANGE UP (ref 8.5–10.1)
CHLORIDE SERPL-SCNC: 107 MMOL/L — SIGNIFICANT CHANGE UP (ref 96–108)
CO2 SERPL-SCNC: 30 MMOL/L — SIGNIFICANT CHANGE UP (ref 22–31)
CREAT SERPL-MCNC: 0.99 MG/DL — SIGNIFICANT CHANGE UP (ref 0.5–1.3)
EGFR: 96 ML/MIN/1.73M2 — SIGNIFICANT CHANGE UP
EOSINOPHIL # BLD AUTO: 0.15 K/UL — SIGNIFICANT CHANGE UP (ref 0–0.5)
EOSINOPHIL NFR BLD AUTO: 2 % — SIGNIFICANT CHANGE UP (ref 0–6)
GLUCOSE SERPL-MCNC: 106 MG/DL — HIGH (ref 70–99)
HCT VFR BLD CALC: 47.2 % — SIGNIFICANT CHANGE UP (ref 39–50)
HGB BLD-MCNC: 16.1 G/DL — SIGNIFICANT CHANGE UP (ref 13–17)
IMM GRANULOCYTES NFR BLD AUTO: 0.1 % — SIGNIFICANT CHANGE UP (ref 0–1.5)
INR BLD: 0.99 RATIO — SIGNIFICANT CHANGE UP (ref 0.88–1.16)
LYMPHOCYTES # BLD AUTO: 1.47 K/UL — SIGNIFICANT CHANGE UP (ref 1–3.3)
LYMPHOCYTES # BLD AUTO: 19.1 % — SIGNIFICANT CHANGE UP (ref 13–44)
MCHC RBC-ENTMCNC: 28.8 PG — SIGNIFICANT CHANGE UP (ref 27–34)
MCHC RBC-ENTMCNC: 34.1 GM/DL — SIGNIFICANT CHANGE UP (ref 32–36)
MCV RBC AUTO: 84.3 FL — SIGNIFICANT CHANGE UP (ref 80–100)
MONOCYTES # BLD AUTO: 0.55 K/UL — SIGNIFICANT CHANGE UP (ref 0–0.9)
MONOCYTES NFR BLD AUTO: 7.2 % — SIGNIFICANT CHANGE UP (ref 2–14)
NEUTROPHILS # BLD AUTO: 5.47 K/UL — SIGNIFICANT CHANGE UP (ref 1.8–7.4)
NEUTROPHILS NFR BLD AUTO: 71.2 % — SIGNIFICANT CHANGE UP (ref 43–77)
PLATELET # BLD AUTO: 208 K/UL — SIGNIFICANT CHANGE UP (ref 150–400)
POTASSIUM SERPL-MCNC: 4.6 MMOL/L — SIGNIFICANT CHANGE UP (ref 3.5–5.3)
POTASSIUM SERPL-SCNC: 4.6 MMOL/L — SIGNIFICANT CHANGE UP (ref 3.5–5.3)
PROTHROM AB SERPL-ACNC: 11.5 SEC — SIGNIFICANT CHANGE UP (ref 10.5–13.4)
RBC # BLD: 5.6 M/UL — SIGNIFICANT CHANGE UP (ref 4.2–5.8)
RBC # FLD: 13.3 % — SIGNIFICANT CHANGE UP (ref 10.3–14.5)
SODIUM SERPL-SCNC: 138 MMOL/L — SIGNIFICANT CHANGE UP (ref 135–145)
WBC # BLD: 7.68 K/UL — SIGNIFICANT CHANGE UP (ref 3.8–10.5)
WBC # FLD AUTO: 7.68 K/UL — SIGNIFICANT CHANGE UP (ref 3.8–10.5)

## 2022-07-21 PROCEDURE — 85025 COMPLETE CBC W/AUTO DIFF WBC: CPT

## 2022-07-21 PROCEDURE — 93010 ELECTROCARDIOGRAM REPORT: CPT

## 2022-07-21 PROCEDURE — 86850 RBC ANTIBODY SCREEN: CPT

## 2022-07-21 PROCEDURE — 86901 BLOOD TYPING SEROLOGIC RH(D): CPT

## 2022-07-21 PROCEDURE — 85730 THROMBOPLASTIN TIME PARTIAL: CPT

## 2022-07-21 PROCEDURE — 93005 ELECTROCARDIOGRAM TRACING: CPT

## 2022-07-21 PROCEDURE — 36415 COLL VENOUS BLD VENIPUNCTURE: CPT

## 2022-07-21 PROCEDURE — 80048 BASIC METABOLIC PNL TOTAL CA: CPT

## 2022-07-21 PROCEDURE — 71046 X-RAY EXAM CHEST 2 VIEWS: CPT | Mod: 26

## 2022-07-21 PROCEDURE — 99214 OFFICE O/P EST MOD 30 MIN: CPT | Mod: 25

## 2022-07-21 PROCEDURE — 85610 PROTHROMBIN TIME: CPT

## 2022-07-21 PROCEDURE — 71046 X-RAY EXAM CHEST 2 VIEWS: CPT

## 2022-07-21 PROCEDURE — 86900 BLOOD TYPING SEROLOGIC ABO: CPT

## 2022-07-21 RX ORDER — ALPRAZOLAM 0.25 MG
1 TABLET ORAL
Qty: 0 | Refills: 0 | DISCHARGE

## 2022-07-21 RX ORDER — FAMOTIDINE 10 MG/ML
1 INJECTION INTRAVENOUS
Qty: 0 | Refills: 0 | DISCHARGE

## 2022-07-21 RX ORDER — SERTRALINE 25 MG/1
1 TABLET, FILM COATED ORAL
Qty: 0 | Refills: 0 | DISCHARGE

## 2022-07-21 RX ORDER — CETIRIZINE HYDROCHLORIDE 10 MG/1
1 TABLET ORAL
Qty: 0 | Refills: 0 | DISCHARGE

## 2022-07-21 NOTE — H&P PST ADULT - NSICDXPASTMEDICALHX_GEN_ALL_CORE_FT
PAST MEDICAL HISTORY:  2019 novel coronavirus disease (COVID-19) 6/ 2022    Anxiety and depression     Bilateral carpal tunnel syndrome     Bilateral dry eyes     Bulging lumbar disc s/p SARITA    Chronic headaches last 3 yrs    Environmental and seasonal allergies     Hemorrhoid internal and external bleeding    History of IBS     Increased pressure in the eye, left both eyes    Marijuana use, continuous     Meningioma     Reactive airway disease mild

## 2022-07-21 NOTE — H&P PST ADULT - HISTORY OF PRESENT ILLNESS
47 years old male with Hemorrhoids. "I have had them since I was 15." Reports increase pain and rectal bleed. Lidocaine ointment for pain. Planned hemorrhoidectomy.       46 y/o with h/o Anxiety,  Chronic back pain (smokes marijuana daily), Meningioma s/p resection on 10/2019, c/o bilateral hand pain, numbness/tingling in thumb thru middle finger bilaterally for over past year s/p bilateral cortisone injections in September with mild relief for a few weeks. Today he presents to PST for scheduled Jorge Carpal Tunnel Release on 11/30/20. Denies any palpitations, SOB, N/V, fever or chills.   ***COVID swab scheduled for 11/25/20***

## 2022-07-21 NOTE — H&P PST ADULT - ASSESSMENT
47 years old male present to PST prior to hemorrhoidectomy, partial proctectomy, proctoplasty with Dr. Lynch.    Plan   1. NPO as per ASU  2. Take the following medications with sips of water on the day of procedure: Xanax, Zoloft  3. Use E-Z sponge as directed  4. Covid swab scheduled  5. Drink a quart of extra  fluids the day before your surgery.  6. Medical optimization for surgery with Dr. Castro  7. CBC, BMP, PT/ INR and PTT, Type and Screen sent to lab  8. EKG and Chest x- ray done

## 2022-07-21 NOTE — H&P PST ADULT - NSICDXFAMILYHX_GEN_ALL_CORE_FT
FAMILY HISTORY:  Father  Still living? No  Paternal family history of dementia, Age at diagnosis: Age Unknown    Mother  Still living? Yes, Estimated age: 71-80  Family history of valvular heart disease, Age at diagnosis: Age Unknown

## 2022-07-21 NOTE — H&P PST ADULT - NSICDXPASTSURGICALHX_GEN_ALL_CORE_FT
PAST SURGICAL HISTORY:  H/O colonoscopy     H/O endoscopy     History of carpal tunnel release bilateral 11/ 13/2020    S/P arthroscopic knee surgery right knee 1993    S/P resection of meningioma transphenoidal removal of a meningioma 10/2019

## 2022-07-22 ENCOUNTER — APPOINTMENT (OUTPATIENT)
Dept: INTERNAL MEDICINE | Facility: CLINIC | Age: 47
End: 2022-07-22

## 2022-07-22 VITALS
HEART RATE: 67 BPM | TEMPERATURE: 97.2 F | SYSTOLIC BLOOD PRESSURE: 100 MMHG | WEIGHT: 161 LBS | HEIGHT: 68 IN | OXYGEN SATURATION: 98 % | BODY MASS INDEX: 24.4 KG/M2 | DIASTOLIC BLOOD PRESSURE: 64 MMHG

## 2022-07-22 DIAGNOSIS — U07.1 COVID-19: ICD-10-CM

## 2022-07-22 DIAGNOSIS — Z01.818 ENCOUNTER FOR OTHER PREPROCEDURAL EXAMINATION: ICD-10-CM

## 2022-07-22 DIAGNOSIS — K64.4 RESIDUAL HEMORRHOIDAL SKIN TAGS: ICD-10-CM

## 2022-07-22 DIAGNOSIS — K64.8 OTHER HEMORRHOIDS: ICD-10-CM

## 2022-07-22 PROCEDURE — 99214 OFFICE O/P EST MOD 30 MIN: CPT | Mod: 25

## 2022-07-23 ENCOUNTER — NON-APPOINTMENT (OUTPATIENT)
Age: 47
End: 2022-07-23

## 2022-07-24 NOTE — PLAN
[FreeTextEntry1] : discussed w pt \par \par reviewed preop testing including labs and EKG, unremarkable\par \par no contraindications to the planned surgery as scheduled \par \par hold OTC NSAIDs one week prior to surgery \par \par may take sertraline on morning of surgery with a sip of water\par \par please call with any questions \par \par RTO 4 months for routine physical exam or earlier prn if any new concerns

## 2022-07-24 NOTE — HISTORY OF PRESENT ILLNESS
[No Pertinent Cardiac History] : no history of aortic stenosis, atrial fibrillation, coronary artery disease, recent myocardial infarction, or implantable device/pacemaker [No Pertinent Pulmonary History] : no history of asthma, COPD, sleep apnea, or smoking [No Adverse Anesthesia Reaction] : no adverse anesthesia reaction in self or family member [(Patient denies any chest pain, claudication, dyspnea on exertion, orthopnea, palpitations or syncope)] : Patient denies any chest pain, claudication, dyspnea on exertion, orthopnea, palpitations or syncope [Excellent (>10 METs)] : Excellent (>10 METs) [Chronic Anticoagulation] : no chronic anticoagulation [Diabetes] : no diabetes [FreeTextEntry1] : - hemorrhoidectomy  [FreeTextEntry2] : 7/27/22 [FreeTextEntry3] : - Dr Rinku Lynch - [facility]  [FreeTextEntry4] : \par presents for medical evaluation prior to planned hemorrhoid surgery. he feels well overall currently. he recovered from COVID illness about one month ago. no residual concerns. he has been fully vaccinated. \par \par chronic anxiety controlled on rx \par IBS symptoms controlled with diet \par s/p cranial surgery for excision of benign meningioma 2019 \par \par no prior problems with anesthesia, no hx of bleeding problems or thromboembolism

## 2022-07-25 ENCOUNTER — NON-APPOINTMENT (OUTPATIENT)
Age: 47
End: 2022-07-25

## 2022-07-26 ENCOUNTER — TRANSCRIPTION ENCOUNTER (OUTPATIENT)
Age: 47
End: 2022-07-26

## 2022-07-26 RX ORDER — OXYCODONE HYDROCHLORIDE 5 MG/1
5 TABLET ORAL ONCE
Refills: 0 | Status: DISCONTINUED | OUTPATIENT
Start: 2022-07-27 | End: 2022-07-27

## 2022-07-26 RX ORDER — FENTANYL CITRATE 50 UG/ML
50 INJECTION INTRAVENOUS
Refills: 0 | Status: DISCONTINUED | OUTPATIENT
Start: 2022-07-27 | End: 2022-07-27

## 2022-07-26 RX ORDER — ONDANSETRON 8 MG/1
4 TABLET, FILM COATED ORAL ONCE
Refills: 0 | Status: DISCONTINUED | OUTPATIENT
Start: 2022-07-27 | End: 2022-07-27

## 2022-07-26 RX ORDER — FENTANYL CITRATE 50 UG/ML
25 INJECTION INTRAVENOUS
Refills: 0 | Status: DISCONTINUED | OUTPATIENT
Start: 2022-07-27 | End: 2022-07-27

## 2022-07-26 RX ORDER — SODIUM CHLORIDE 9 MG/ML
1000 INJECTION, SOLUTION INTRAVENOUS
Refills: 0 | Status: DISCONTINUED | OUTPATIENT
Start: 2022-07-27 | End: 2022-07-27

## 2022-07-26 RX ORDER — SODIUM CHLORIDE 9 MG/ML
3 INJECTION INTRAMUSCULAR; INTRAVENOUS; SUBCUTANEOUS EVERY 8 HOURS
Refills: 0 | Status: DISCONTINUED | OUTPATIENT
Start: 2022-07-27 | End: 2022-07-27

## 2022-07-27 ENCOUNTER — RESULT REVIEW (OUTPATIENT)
Age: 47
End: 2022-07-27

## 2022-07-27 ENCOUNTER — TRANSCRIPTION ENCOUNTER (OUTPATIENT)
Age: 47
End: 2022-07-27

## 2022-07-27 ENCOUNTER — OUTPATIENT (OUTPATIENT)
Dept: INPATIENT UNIT | Facility: HOSPITAL | Age: 47
LOS: 1 days | Discharge: HOME | End: 2022-07-27
Payer: MEDICAID

## 2022-07-27 ENCOUNTER — APPOINTMENT (OUTPATIENT)
Dept: COLORECTAL SURGERY | Facility: HOSPITAL | Age: 47
End: 2022-07-27

## 2022-07-27 VITALS
RESPIRATION RATE: 16 BRPM | WEIGHT: 160.94 LBS | HEART RATE: 55 BPM | SYSTOLIC BLOOD PRESSURE: 112 MMHG | OXYGEN SATURATION: 96 % | TEMPERATURE: 98 F | DIASTOLIC BLOOD PRESSURE: 66 MMHG | HEIGHT: 68 IN

## 2022-07-27 VITALS
OXYGEN SATURATION: 97 % | RESPIRATION RATE: 16 BRPM | SYSTOLIC BLOOD PRESSURE: 118 MMHG | TEMPERATURE: 97 F | HEART RATE: 50 BPM | DIASTOLIC BLOOD PRESSURE: 80 MMHG

## 2022-07-27 DIAGNOSIS — Z98.890 OTHER SPECIFIED POSTPROCEDURAL STATES: Chronic | ICD-10-CM

## 2022-07-27 DIAGNOSIS — K64.8 OTHER HEMORRHOIDS: ICD-10-CM

## 2022-07-27 DIAGNOSIS — K64.4 RESIDUAL HEMORRHOIDAL SKIN TAGS: ICD-10-CM

## 2022-07-27 PROCEDURE — 45130 EXCISION OF RECTAL PROLAPSE: CPT

## 2022-07-27 PROCEDURE — 45505 REPAIR OF RECTUM: CPT | Mod: 59

## 2022-07-27 PROCEDURE — 46260 REMOVE IN/EX HEM GROUPS 2+: CPT

## 2022-07-27 PROCEDURE — 88304 TISSUE EXAM BY PATHOLOGIST: CPT | Mod: 26

## 2022-07-27 PROCEDURE — 88304 TISSUE EXAM BY PATHOLOGIST: CPT

## 2022-07-27 RX ORDER — FAMOTIDINE 10 MG/ML
20 INJECTION INTRAVENOUS ONCE
Refills: 0 | Status: COMPLETED | OUTPATIENT
Start: 2022-07-27 | End: 2022-07-27

## 2022-07-27 RX ORDER — SERTRALINE 25 MG/1
1 TABLET, FILM COATED ORAL
Qty: 0 | Refills: 0 | DISCHARGE

## 2022-07-27 RX ORDER — ALPRAZOLAM 0.25 MG
1 TABLET ORAL
Qty: 0 | Refills: 0 | DISCHARGE

## 2022-07-27 RX ORDER — ZOLPIDEM TARTRATE 10 MG/1
1 TABLET ORAL
Qty: 0 | Refills: 0 | DISCHARGE

## 2022-07-27 RX ORDER — OXYCODONE AND ACETAMINOPHEN 5; 325 MG/1; MG/1
1 TABLET ORAL EVERY 4 HOURS
Refills: 0 | Status: DISCONTINUED | OUTPATIENT
Start: 2022-07-27 | End: 2022-07-27

## 2022-07-27 RX ORDER — PANTOPRAZOLE SODIUM 20 MG/1
1 TABLET, DELAYED RELEASE ORAL
Qty: 0 | Refills: 0 | DISCHARGE

## 2022-07-27 RX ORDER — ONDANSETRON 8 MG/1
4 TABLET, FILM COATED ORAL EVERY 6 HOURS
Refills: 0 | Status: DISCONTINUED | OUTPATIENT
Start: 2022-07-27 | End: 2022-07-27

## 2022-07-27 RX ADMIN — FAMOTIDINE 20 MILLIGRAM(S): 10 INJECTION INTRAVENOUS at 06:32

## 2022-07-27 NOTE — BRIEF OPERATIVE NOTE - NSICDXBRIEFPROCEDURE_GEN_ALL_CORE_FT
PROCEDURES:  Hemorrhoidectomy, internal and external, involving 2 or more anal columns 27-Jul-2022 08:57:10  Gene Dumont

## 2022-07-27 NOTE — ASU DISCHARGE PLAN (ADULT/PEDIATRIC) - CARE PROVIDER_API CALL
Rinku Lynch)  ColonRectal Surgery; Surgery  [address], [address]  [place] [address]  Phone: [phone]  Fax: [phone]  Follow Up Time: 1 month

## 2022-07-27 NOTE — ASU PATIENT PROFILE, ADULT - AS SC BRADEN NUTRITION
----- Message from Nasima Collins P.A.-C. sent at 12/7/2020  8:09 AM PST -----  Regarding: FW: Procedure Question  Contact: [phone]  Please schedule patient for a virtual visit.  ----- Message -----  From: Олег Ramirez  Sent: 12/3/2020   9:52 AM PST  To: Nasima Collins P.A.-C.  Subject: RE: Procedure Question                           Ok    ----- Message -----  From: Nasima Collins P.A.-C.  Sent: 12/2/20 2:28 PM  To: Олег Ramirez  Subject: RE: Procedure Question    I recommend that we set up an appointment to discuss the paperwork.  We can do this virtually.  I will send this message to my front staff to schedule you.      ----- Message -----       From:Олег Ramirez       Sent:12/2/2020  1:29 PM PST         To:Nasima Collins P.A.-C.    Subject:Procedure Question    There's was things missed in paperwork you faxed back and it won't be accepted unless filled or i was told a letter 4 those three days will also work.        (3) adequate

## 2022-07-27 NOTE — ASU DISCHARGE PLAN (ADULT/PEDIATRIC) - NS MD DC FALL RISK RISK
For information on Fall & Injury Prevention, visit: [email or web address] OR  [email or web address] OR  https://www.cdc.gov/steadi/patient.html

## 2022-07-27 NOTE — BRIEF OPERATIVE NOTE - NSICDXBRIEFPOSTOP_GEN_ALL_CORE_FT
POST-OP DIAGNOSIS:  Internal and external prolapsed hemorrhoids 27-Jul-2022 08:59:43  Gene Dumont  Internal and external bleeding hemorrhoids 27-Jul-2022 09:00:15  Gene Dumont

## 2022-07-27 NOTE — BRIEF OPERATIVE NOTE - NSICDXBRIEFPREOP_GEN_ALL_CORE_FT
PRE-OP DIAGNOSIS:  Internal and external prolapsed hemorrhoids 27-Jul-2022 08:58:17  Gene Dumont  Internal and external bleeding hemorrhoids 27-Jul-2022 09:00:56  Gene Dumont

## 2022-07-28 PROBLEM — J30.89 OTHER ALLERGIC RHINITIS: Chronic | Status: ACTIVE | Noted: 2022-07-21

## 2022-07-28 PROBLEM — H40.052 OCULAR HYPERTENSION, LEFT EYE: Chronic | Status: ACTIVE | Noted: 2022-07-21

## 2022-07-28 PROBLEM — H04.123 DRY EYE SYNDROME OF BILATERAL LACRIMAL GLANDS: Chronic | Status: ACTIVE | Noted: 2022-07-21

## 2022-07-28 PROBLEM — F41.9 ANXIETY DISORDER, UNSPECIFIED: Chronic | Status: ACTIVE | Noted: 2022-07-21

## 2022-07-28 PROBLEM — U07.1 COVID-19: Chronic | Status: ACTIVE | Noted: 2022-07-21

## 2022-07-28 PROBLEM — J45.909 UNSPECIFIED ASTHMA, UNCOMPLICATED: Chronic | Status: ACTIVE | Noted: 2022-07-21

## 2022-07-28 PROBLEM — K64.9 UNSPECIFIED HEMORRHOIDS: Chronic | Status: ACTIVE | Noted: 2022-07-21

## 2022-07-29 ENCOUNTER — TRANSCRIPTION ENCOUNTER (OUTPATIENT)
Age: 47
End: 2022-07-29

## 2022-07-29 ENCOUNTER — RX RENEWAL (OUTPATIENT)
Age: 47
End: 2022-07-29

## 2022-07-30 ENCOUNTER — TRANSCRIPTION ENCOUNTER (OUTPATIENT)
Age: 47
End: 2022-07-30

## 2022-08-02 ENCOUNTER — APPOINTMENT (OUTPATIENT)
Dept: GASTROENTEROLOGY | Facility: AMBULATORY MEDICAL SERVICES | Age: 47
End: 2022-08-02

## 2022-08-02 DIAGNOSIS — J45.909 UNSPECIFIED ASTHMA, UNCOMPLICATED: ICD-10-CM

## 2022-08-02 DIAGNOSIS — M51.26 OTHER INTERVERTEBRAL DISC DISPLACEMENT, LUMBAR REGION: ICD-10-CM

## 2022-08-02 DIAGNOSIS — K58.9 IRRITABLE BOWEL SYNDROME WITHOUT DIARRHEA: ICD-10-CM

## 2022-08-02 DIAGNOSIS — K21.9 GASTRO-ESOPHAGEAL REFLUX DISEASE WITHOUT ESOPHAGITIS: ICD-10-CM

## 2022-08-02 DIAGNOSIS — K64.8 OTHER HEMORRHOIDS: ICD-10-CM

## 2022-08-02 DIAGNOSIS — K64.4 RESIDUAL HEMORRHOIDAL SKIN TAGS: ICD-10-CM

## 2022-08-02 DIAGNOSIS — Z87.891 PERSONAL HISTORY OF NICOTINE DEPENDENCE: ICD-10-CM

## 2022-08-02 DIAGNOSIS — H04.123 DRY EYE SYNDROME OF BILATERAL LACRIMAL GLANDS: ICD-10-CM

## 2022-08-02 DIAGNOSIS — Z79.899 OTHER LONG TERM (CURRENT) DRUG THERAPY: ICD-10-CM

## 2022-08-02 DIAGNOSIS — F41.9 ANXIETY DISORDER, UNSPECIFIED: ICD-10-CM

## 2022-08-02 DIAGNOSIS — K62.3 RECTAL PROLAPSE: ICD-10-CM

## 2022-08-02 DIAGNOSIS — Z86.16 PERSONAL HISTORY OF COVID-19: ICD-10-CM

## 2022-08-04 DIAGNOSIS — K59.4 ANAL SPASM: ICD-10-CM

## 2022-08-08 ENCOUNTER — APPOINTMENT (OUTPATIENT)
Dept: INTERNAL MEDICINE | Facility: CLINIC | Age: 47
End: 2022-08-08

## 2022-08-16 ENCOUNTER — APPOINTMENT (OUTPATIENT)
Dept: ORTHOPEDIC SURGERY | Facility: CLINIC | Age: 47
End: 2022-08-16

## 2022-08-16 VITALS — WEIGHT: 161 LBS | BODY MASS INDEX: 25.27 KG/M2 | HEIGHT: 67 IN

## 2022-08-16 DIAGNOSIS — M70.21 OLECRANON BURSITIS, RIGHT ELBOW: ICD-10-CM

## 2022-08-16 PROCEDURE — 73080 X-RAY EXAM OF ELBOW: CPT | Mod: RT

## 2022-08-16 PROCEDURE — 99203 OFFICE O/P NEW LOW 30 MIN: CPT | Mod: 25

## 2022-08-16 PROCEDURE — 20605 DRAIN/INJ JOINT/BURSA W/O US: CPT | Mod: RT

## 2022-08-18 ENCOUNTER — TRANSCRIPTION ENCOUNTER (OUTPATIENT)
Age: 47
End: 2022-08-18

## 2022-08-18 ENCOUNTER — NON-APPOINTMENT (OUTPATIENT)
Age: 47
End: 2022-08-18

## 2022-08-18 ENCOUNTER — RX RENEWAL (OUTPATIENT)
Age: 47
End: 2022-08-18

## 2022-08-18 RX ORDER — SODIUM SULFATE, POTASSIUM SULFATE, MAGNESIUM SULFATE 17.5; 3.13; 1.6 G/ML; G/ML; G/ML
17.5-3.13-1.6 SOLUTION, CONCENTRATE ORAL
Qty: 1 | Refills: 0 | Status: DISCONTINUED | COMMUNITY
Start: 2022-05-24 | End: 2022-08-18

## 2022-08-24 ENCOUNTER — APPOINTMENT (OUTPATIENT)
Dept: COLORECTAL SURGERY | Facility: CLINIC | Age: 47
End: 2022-08-24

## 2022-08-24 VITALS
DIASTOLIC BLOOD PRESSURE: 79 MMHG | SYSTOLIC BLOOD PRESSURE: 114 MMHG | HEIGHT: 67 IN | HEART RATE: 86 BPM | BODY MASS INDEX: 25.11 KG/M2 | WEIGHT: 160 LBS

## 2022-08-24 DIAGNOSIS — Z09 ENCOUNTER FOR FOLLOW-UP EXAMINATION AFTER COMPLETED TREATMENT FOR CONDITIONS OTHER THAN MALIGNANT NEOPLASM: ICD-10-CM

## 2022-08-24 DIAGNOSIS — G89.18 OTHER ACUTE POSTPROCEDURAL PAIN: ICD-10-CM

## 2022-08-24 PROCEDURE — 99024 POSTOP FOLLOW-UP VISIT: CPT

## 2022-08-24 NOTE — DATA REVIEWED
[FreeTextEntry1] : surgical pathology reviewed - benign \par \par  Pathology             Final\par \par No Documents Attached\par \par \par \par \par   [facility] Accession Number : [id number]\par Patient:   MARISELA PARKER\par \par \par Accession:                             [id number]\par \par Collected Date/Time:                   7/27/2022 07:20 EDT\par Received Date/Time:                    7/27/2022 12:28 EDT\par \par Surgical Pathology Report - Auth (Verified)\par \par Specimen(s) Submitted\par 1  Left lateral internal/external hemorrhoids with rectal mucosal prolapse\par 2  Right lateral internal/external hemorrhoids with rectal mucosal prolapse\par \par Final Diagnosis\par 1. Tissue designated "left lateral internal and external hemorrhoids with\par rectal mucosal prolapse":\par - Squamoglandular (colonic-type) mucosa with acute and chronic\par \par inflammation and dilated and congested vessels consistent with\par hemorrhoids.\par \par 2. Tissue designated "right lateral internal/external hemorrhoids with\par rectal mucosal prolapse":\par - Squamous mucosa with patchy chronic inflammation and dilated and\par congested vessels consistent with hemorrhoids.\par Verified by: MARELY TURK M.D.\par (Electronic Signature)\par Reported on: 08/01/22 09:11 EDT, [address] [place] [address]\par Phone: [phone]   Fax: [phone]\par _________________________________________________________________\par \par \par Clinical Information\par Internal/external hemorrhoids with rectal mucosal prolapse; Z01.818; K64.8\par \par Gross Description\par 1.  Received: In formalin labeled  "left lateral internal and external\par hemorrhoids with rectal mucosal prolapse"\par Size:  One, 2.1 x 2 x 1 cm\par Description:  Grey-violet, wrinkled with a cauterized margin\par Cut Surface:  Pink-tan and soft with blood-filled tortuous vessels\par Submitted:  Representative sections in one cassette: 1A\par \par 2.  Received: In formalin labeled  "right lateral internal/external\par hemorrhoids with rectal mucosal prolapse"\par Size:  One, 2.9 x 1.8 x 1.1 cm\par Description:  Grey-violet, wrinkled with a cauterized margin\par Cut Surface:  Pink-tan and soft with blood-filled tortuous vessels\par Submitted:  Representative sections in one cassette: 2A\par \par Freya Ly 07/27/2022 01:41 PM\par \par Disclaimer\par \par In addition to other data that may appear on the specimen containers, all\par labels have been inspected to confirm the presence of the patient's name\par and date of birth.\par \par  \par \par  Ordered by: ANGELA HANNA       Collected/Examined: [id number] 07:20AM       \par Verified by: JOHAN SHUKLA [id number] 09:34AM       \par  Result Communication: No patient communication needed at this time;\par Stage: Final       \par  Performed at: [facility]       Resulted: [id number] 09:11AM       Last Updated: 01Aug2022 09:34AM       Accession: [id number]

## 2022-08-24 NOTE — ASSESSMENT
[FreeTextEntry1] : 47 year old male s/p hemorrhoidectomy. Doing as expected. Surgical site was w/o s/s of infection. Surgical pathology reviewed with patient. All questions/concerns addressed to his satisfaction. \par \par s/p hemorrhoidectomy\par - regular diet\par - fiber, stool softeners\par - sitz baths\par - restart dicyclomine for IBS/chronic constipation/abdominal spasms.\par \par RTO in 6-8 weeks for f/u with Dr. Lynch

## 2022-08-24 NOTE — PHYSICAL EXAM
[Respiratory Effort] : normal respiratory effort [No Rash or Lesion] : No rash or lesion [Alert] : alert [Oriented to Person] : oriented to person [Oriented to Place] : oriented to place [Oriented to Time] : oriented to time [Calm] : calm [de-identified] : surgical site clean, no erythema, drainage or odor noted.  [de-identified] : well appearing, no acute distress [de-identified] : SURI x4 [de-identified] : warm and dry

## 2022-08-24 NOTE — HISTORY OF PRESENT ILLNESS
[FreeTextEntry1] : 47 year old male who presents to the office s/p hemorrhoidectomy.  Overall, doing as expected. Still having appropriate pain especially after BMs but much improved since surgery.  He reports some occasional constipating stools. Taking fiber.  Denies fever/chills, n/v.

## 2022-08-29 ENCOUNTER — RX RENEWAL (OUTPATIENT)
Age: 47
End: 2022-08-29

## 2022-09-09 PROBLEM — M70.21 OLECRANON BURSITIS OF RIGHT ELBOW: Status: ACTIVE | Noted: 2022-09-09

## 2022-09-09 NOTE — ADDENDUM
[FreeTextEntry1] : This note was written by Anaya Mcleod on 08/16/2022 acting solely as a scribe for Dr. Rhett Mays.\par \par All medical record entries made by the Scribe were at my, Dr. Rhett Mays, direction and personally dictated by me on 08/16/2022. I have personally reviewed the chart and agree that the record accurately reflects my personal performance of the history, physical exam, assessment and plan.

## 2022-09-09 NOTE — DISCUSSION/SUMMARY
[de-identified] : 48 y/o male with right olecranon bursitis. \par \par I discussed with the patient the treatment of olecranon bursitis. Discussed conservative management with aspiration of the olecranon bursa in an attempt to improve swelling acutely. Discussed risk of failure as well as possible risk of infection. Patient wished to proceed, and therapeutic aspiration performed today. Recommendations are for conservative management as below.\par \par Recommendation: Conservative care & observation, this includes rest/activity avoidance until less symptomatic, avoidance of direct pressure. Elbow pads. Patient may also use OTC NSAID's or acetaminophen as tolerated, with application of ice to the area 2-3x daily for 20 minutes after periods of activity.\par \par We will see the patient back as needed.

## 2022-09-09 NOTE — PHYSICAL EXAM
[de-identified] : Oriented to time, place, person\par Mood: Normal\par Affect: Normal\par Appearance: Healthy, well appearing, no acute distress.\par Gait: Normal\par Assistive Devices: None\par \par Right elbow exam\par \par Skin: Clean, dry, intact. No ecchymosis. No swelling. + palpable joint effusion.\par ROM: Full flexion/extension, full supination/pronation.\par Painful ROM: None\par Tenderness: No medial epicondyle pain. No lateral epicondyle pain. No olecranon pain. No pain at radial head.\par Strength: 5/5 elbow flexion, 5/5 elbow extension, 5/5 supination, 5/5 pronation\par Stability: Stable to vaus/valgus stress\par Vasc: 2+ radial pulse, <2s cap refill\par Sensation: In tact to light touch throughout\par Neuro: Negative tinels at ulnar canal, AIN/PIN/Ulnar nerve in tact to motor/sensation  [de-identified] : The following radiographs were ordered and read by me during this patients visit. I reviewed each radiograph in detail with the patient and discussed the findings as highlighted below. \par \par 3 views of the right elbow were obtained today, 08/16/2022, that show no acute fracture or dislocation. There is no degenerative change seen. There is no gross malalignment. Small enthesophyte within the olecranon. Evidence of olecranon bursitis.

## 2022-09-09 NOTE — PROCEDURE
[de-identified] : Aspiration:Right olecranon bursa\par Indication: Olecranon bursitis. \par \par A discussion was had with the patient regarding this procedure and all questions were answered. All risks, benefits and alternatives were discussed. These included but were not limited to bleeding, infection, and reaccumulation of fluid. Alcohol was used to clean the skin, and betadine was used to sterilize and prep the area in the posterior aspect of the right elbow. Ethyl chloride spray was then used as a topical anesthetic. An 18-gauge needle was used to aspirate the olecranon bursa and approximately 11 cc of inflammatory fluid was aspirated from the bursa without complication. A sterile compression bandage was then applied. The patient tolerated the procedure well.

## 2022-09-09 NOTE — HISTORY OF PRESENT ILLNESS
[de-identified] : 48 y/o male presents with right elbow swelling and pain x 3 weeks. The patient reports that he usually bangs his elbow on the wall accidently. The pain is brought on with leaning on the elbow. Otherwise, he has full function of the elbow. Denies numbness or tingling. He reports hx of bilateral carpal tunnel. \par \par The patient's past medical history, past surgical history, medications and allergies were reviewed by me today with the patient and documented accordingly. In addition, the patient's family and social history, which were noncontributory to this visit, were reviewed also. \par \par

## 2022-09-16 ENCOUNTER — TRANSCRIPTION ENCOUNTER (OUTPATIENT)
Age: 47
End: 2022-09-16

## 2022-09-28 ENCOUNTER — NON-APPOINTMENT (OUTPATIENT)
Age: 47
End: 2022-09-28

## 2022-09-28 ENCOUNTER — APPOINTMENT (OUTPATIENT)
Dept: OTOLARYNGOLOGY | Facility: CLINIC | Age: 47
End: 2022-09-28

## 2022-09-28 VITALS
BODY MASS INDEX: 25.43 KG/M2 | WEIGHT: 162 LBS | HEART RATE: 67 BPM | DIASTOLIC BLOOD PRESSURE: 68 MMHG | HEIGHT: 67 IN | SYSTOLIC BLOOD PRESSURE: 108 MMHG | TEMPERATURE: 97.7 F

## 2022-09-28 DIAGNOSIS — R13.10 DYSPHAGIA, UNSPECIFIED: ICD-10-CM

## 2022-09-28 DIAGNOSIS — R07.0 PAIN IN THROAT: ICD-10-CM

## 2022-09-28 PROCEDURE — 31575 DIAGNOSTIC LARYNGOSCOPY: CPT

## 2022-09-28 PROCEDURE — 99213 OFFICE O/P EST LOW 20 MIN: CPT | Mod: 25

## 2022-09-28 NOTE — ASSESSMENT
[FreeTextEntry1] : Right odynophagia and throat pain with small ulceration to left tongue base:\par - Soft diet\par - salt water gargles\par - F/U 2 weeks to ensure resolved. \par \par \par \par I personally saw and examined Mr. MARISELA PARKER in detail this visit today. I personally reviewed the HPI, PMH, FH, SH, ROS and medications/allergies. I have spoken to NAVEEN Dean regarding the history and have personally determined the assessment and plan of care, and documented this myself. I was present and participated in all key portions of the encounter and all procedures noted above. I have made changes in the body of the note where appropriate.\par \par Attesting Faculty: See Attending Signature Below

## 2022-09-28 NOTE — HISTORY OF PRESENT ILLNESS
[de-identified] : 48 y/o M, notes 4 days ago when taking him morning medications felt that one got stuck on the left side.  He notes that over time he began to have burning to the left throat and base of tongue.  Pos odynophagia after that.  POS raspy voice.  \par Feels symptoms have been improving mildly over time, however, symptoms worsen by the end of the day.

## 2022-09-28 NOTE — PROCEDURE
[FreeTextEntry6] : . [de-identified] : Flexible scope #36 used. Passed through nasal passage and nasopharynx/oropharynx/hypopharynx clear. Supraglottis normal. Glottis with fully mobile vocal cords without lesions or masses. Visualized subglottis clear. Postcricoid area without erythema or edema. No pooling of secretions.  Small ulcer to left base of tongue with white exudate.\par \par

## 2022-09-29 ENCOUNTER — TRANSCRIPTION ENCOUNTER (OUTPATIENT)
Age: 47
End: 2022-09-29

## 2022-10-04 ENCOUNTER — APPOINTMENT (OUTPATIENT)
Dept: COLORECTAL SURGERY | Facility: CLINIC | Age: 47
End: 2022-10-04

## 2022-10-04 DIAGNOSIS — K64.4 RESIDUAL HEMORRHOIDAL SKIN TAGS: ICD-10-CM

## 2022-10-04 DIAGNOSIS — K64.8 RESIDUAL HEMORRHOIDAL SKIN TAGS: ICD-10-CM

## 2022-10-04 PROCEDURE — 99024 POSTOP FOLLOW-UP VISIT: CPT

## 2022-10-07 PROBLEM — K64.4 INTERNAL AND EXTERNAL BLEEDING HEMORRHOIDS: Status: ACTIVE | Noted: 2022-06-14

## 2022-10-12 ENCOUNTER — APPOINTMENT (OUTPATIENT)
Dept: OTOLARYNGOLOGY | Facility: CLINIC | Age: 47
End: 2022-10-12

## 2022-10-12 VITALS
BODY MASS INDEX: 25.43 KG/M2 | DIASTOLIC BLOOD PRESSURE: 65 MMHG | SYSTOLIC BLOOD PRESSURE: 105 MMHG | WEIGHT: 162 LBS | TEMPERATURE: 98 F | HEART RATE: 69 BPM | HEIGHT: 67 IN

## 2022-10-12 DIAGNOSIS — K14.0 GLOSSITIS: ICD-10-CM

## 2022-10-12 DIAGNOSIS — R09.82 POSTNASAL DRIP: ICD-10-CM

## 2022-10-12 DIAGNOSIS — R49.0 DYSPHONIA: ICD-10-CM

## 2022-10-12 DIAGNOSIS — J34.89 OTHER SPECIFIED DISORDERS OF NOSE AND NASAL SINUSES: ICD-10-CM

## 2022-10-12 PROCEDURE — 99214 OFFICE O/P EST MOD 30 MIN: CPT | Mod: 25

## 2022-10-12 PROCEDURE — 31575 DIAGNOSTIC LARYNGOSCOPY: CPT

## 2022-10-12 NOTE — HISTORY OF PRESENT ILLNESS
[de-identified] : 48 y/o M, At last visit was noted to have small ulceration to left BOT.  He now notes symptoms are much improved.  Does still get some dryness and scratchiness.  No trouble eating or drinking.

## 2022-10-12 NOTE — END OF VISIT
[FreeTextEntry3] : I personally saw and examined Mr. MARISELA PARKER in detail this visit today. I personally reviewed the HPI, PMH, FH, SH, ROS and medications/allergies. I have spoken to NAVEEN Dean regarding the history and have personally determined the assessment and plan of care, and documented this myself. I was present and participated in all key portions of the encounter and all procedures noted above. I have made changes in the body of the note where appropriate.\par \par Attesting Faculty: See Attending Signature Below

## 2022-10-12 NOTE — PROCEDURE
[de-identified] : Flexible scope #28 used. Passed through nasal passage and nasopharynx/oropharynx/hypopharynx clear. Supraglottis normal. Glottis with fully mobile vocal cords without lesions or masses. Visualized subglottis clear. Postcricoid area without erythema or edema. No pooling of secretions.  No lesion noted to tongue base. \par \par

## 2022-10-12 NOTE — ASSESSMENT
[FreeTextEntry1] : Previously with Left BOT ulcer:\par - Lesion now resolved.  \par \par Nasal crusting and congestion:\par - Sinus wash\par - Flonase\par - Azelastine\par \par - F/U PRN

## 2022-10-20 ENCOUNTER — TRANSCRIPTION ENCOUNTER (OUTPATIENT)
Age: 47
End: 2022-10-20

## 2022-11-23 ENCOUNTER — TRANSCRIPTION ENCOUNTER (OUTPATIENT)
Age: 47
End: 2022-11-23

## 2022-12-04 ENCOUNTER — RX RENEWAL (OUTPATIENT)
Age: 47
End: 2022-12-04

## 2022-12-04 ENCOUNTER — NON-APPOINTMENT (OUTPATIENT)
Age: 47
End: 2022-12-04

## 2022-12-07 ENCOUNTER — RESULT REVIEW (OUTPATIENT)
Age: 47
End: 2022-12-07

## 2022-12-07 ENCOUNTER — OUTPATIENT (OUTPATIENT)
Dept: OUTPATIENT SERVICES | Facility: HOSPITAL | Age: 47
LOS: 1 days | End: 2022-12-07
Payer: MEDICAID

## 2022-12-07 ENCOUNTER — APPOINTMENT (OUTPATIENT)
Dept: MRI IMAGING | Facility: CLINIC | Age: 47
End: 2022-12-07

## 2022-12-07 DIAGNOSIS — Z98.890 OTHER SPECIFIED POSTPROCEDURAL STATES: Chronic | ICD-10-CM

## 2022-12-07 DIAGNOSIS — Z00.8 ENCOUNTER FOR OTHER GENERAL EXAMINATION: ICD-10-CM

## 2022-12-07 PROCEDURE — A9585: CPT

## 2022-12-07 PROCEDURE — 70553 MRI BRAIN STEM W/O & W/DYE: CPT

## 2022-12-07 PROCEDURE — 70553 MRI BRAIN STEM W/O & W/DYE: CPT | Mod: 26

## 2022-12-15 ENCOUNTER — TRANSCRIPTION ENCOUNTER (OUTPATIENT)
Age: 47
End: 2022-12-15

## 2022-12-20 ENCOUNTER — TRANSCRIPTION ENCOUNTER (OUTPATIENT)
Age: 47
End: 2022-12-20

## 2022-12-21 ENCOUNTER — APPOINTMENT (OUTPATIENT)
Dept: SPINE | Facility: CLINIC | Age: 47
End: 2022-12-21

## 2022-12-21 VITALS
BODY MASS INDEX: 25.43 KG/M2 | WEIGHT: 162 LBS | HEIGHT: 67 IN | HEART RATE: 69 BPM | SYSTOLIC BLOOD PRESSURE: 110 MMHG | DIASTOLIC BLOOD PRESSURE: 76 MMHG | OXYGEN SATURATION: 97 %

## 2022-12-21 PROCEDURE — 99213 OFFICE O/P EST LOW 20 MIN: CPT

## 2022-12-27 ENCOUNTER — TRANSCRIPTION ENCOUNTER (OUTPATIENT)
Age: 47
End: 2022-12-27

## 2022-12-28 NOTE — PHYSICAL THERAPY INITIAL EVALUATION ADULT - GAIT DEVIATIONS NOTED, PT EVAL
The skin at the access site was anesthetized. A left chest cut down was performed and surgical access was obtained. increased time in double stance/decreased stride length/decreased step length/decreased trace/decreased weight-shifting ability

## 2022-12-29 DIAGNOSIS — Z20.822 ENCOUNTER FOR PREPROCEDURAL LABORATORY EXAMINATION: ICD-10-CM

## 2022-12-29 DIAGNOSIS — Z01.812 ENCOUNTER FOR PREPROCEDURAL LABORATORY EXAMINATION: ICD-10-CM

## 2023-01-27 ENCOUNTER — TRANSCRIPTION ENCOUNTER (OUTPATIENT)
Age: 48
End: 2023-01-27

## 2023-01-30 ENCOUNTER — TRANSCRIPTION ENCOUNTER (OUTPATIENT)
Age: 48
End: 2023-01-30

## 2023-02-01 ENCOUNTER — NON-APPOINTMENT (OUTPATIENT)
Age: 48
End: 2023-02-01

## 2023-02-16 ENCOUNTER — APPOINTMENT (OUTPATIENT)
Dept: GASTROENTEROLOGY | Facility: AMBULATORY MEDICAL SERVICES | Age: 48
End: 2023-02-16

## 2023-02-21 ENCOUNTER — TRANSCRIPTION ENCOUNTER (OUTPATIENT)
Age: 48
End: 2023-02-21

## 2023-02-21 LAB — SARS-COV-2 N GENE NPH QL NAA+PROBE: NOT DETECTED

## 2023-02-23 ENCOUNTER — APPOINTMENT (OUTPATIENT)
Dept: GASTROENTEROLOGY | Facility: AMBULATORY MEDICAL SERVICES | Age: 48
End: 2023-02-23
Payer: MEDICAID

## 2023-02-23 PROCEDURE — 45378 DIAGNOSTIC COLONOSCOPY: CPT | Mod: 33

## 2023-02-24 ENCOUNTER — TRANSCRIPTION ENCOUNTER (OUTPATIENT)
Age: 48
End: 2023-02-24

## 2023-02-27 ENCOUNTER — RX RENEWAL (OUTPATIENT)
Age: 48
End: 2023-02-27

## 2023-02-27 ENCOUNTER — NON-APPOINTMENT (OUTPATIENT)
Age: 48
End: 2023-02-27

## 2023-03-03 ENCOUNTER — RX RENEWAL (OUTPATIENT)
Age: 48
End: 2023-03-03

## 2023-03-08 ENCOUNTER — NON-APPOINTMENT (OUTPATIENT)
Age: 48
End: 2023-03-08

## 2023-03-10 ENCOUNTER — NON-APPOINTMENT (OUTPATIENT)
Age: 48
End: 2023-03-10

## 2023-03-22 ENCOUNTER — APPOINTMENT (OUTPATIENT)
Dept: PULMONOLOGY | Facility: CLINIC | Age: 48
End: 2023-03-22
Payer: MEDICAID

## 2023-03-22 VITALS
DIASTOLIC BLOOD PRESSURE: 64 MMHG | HEIGHT: 67 IN | OXYGEN SATURATION: 97 % | SYSTOLIC BLOOD PRESSURE: 110 MMHG | HEART RATE: 89 BPM | BODY MASS INDEX: 27 KG/M2 | WEIGHT: 172 LBS | TEMPERATURE: 97.6 F

## 2023-03-22 PROCEDURE — 99204 OFFICE O/P NEW MOD 45 MIN: CPT

## 2023-03-22 NOTE — REASON FOR VISIT
[Initial] : an initial visit [Cough] : cough [Shortness of Breath] : shortness of breath [Nicotine Dependence] : nicotine dependence [TextBox_44] : Marijuana dependence

## 2023-03-22 NOTE — HISTORY OF PRESENT ILLNESS
[Former] : former [>= 20 pack years] : >= 20 pack years [Never] : never [Current] : current [TextBox_4] : Patient is a 47 Year old male former cigarette smoker, current everyday marijuana smoker presents for evaluation shortness of breath cough.\par Patient reports he had bronchitis x 2 this winter.  He reports having COVID as well.  Palak reports a long smoking history however quit nicotine 6 years ago and now smokes marijuana daily.  Patient reports he is short of breath at times.  He denies hemoptysis, chest pain, or other systemic complaints.  He is here for these symptoms.  [TextBox_11] : 1 [TextBox_13] : 25 [YearQuit] : 2017

## 2023-03-22 NOTE — ASSESSMENT
[FreeTextEntry1] : 47 year old male former cigarette smoker, current everyday marijuana smoker presents for evaluation cough, shortness of breath\par \par Obtain PFT\par \par Follow up pending PFT

## 2023-03-22 NOTE — REVIEW OF SYSTEMS
[Postnasal Drip] : postnasal drip [Cough] : cough [Dyspnea] : dyspnea [Wheezing] : wheezing [SOB on Exertion] : sob on exertion [Negative] : Endocrine

## 2023-03-22 NOTE — COUNSELING
[Inadequate social support] : Inadequate social support [Needs reinforcement, provided] : Patient needs reinforcement on understanding of lifestyle changes and steps needed to achieve self management goal; reinforcement was provided

## 2023-03-23 ENCOUNTER — RX RENEWAL (OUTPATIENT)
Age: 48
End: 2023-03-23

## 2023-04-03 ENCOUNTER — TRANSCRIPTION ENCOUNTER (OUTPATIENT)
Age: 48
End: 2023-04-03

## 2023-04-19 ENCOUNTER — RX RENEWAL (OUTPATIENT)
Age: 48
End: 2023-04-19

## 2023-04-27 ENCOUNTER — TRANSCRIPTION ENCOUNTER (OUTPATIENT)
Age: 48
End: 2023-04-27

## 2023-05-04 ENCOUNTER — TRANSCRIPTION ENCOUNTER (OUTPATIENT)
Age: 48
End: 2023-05-04

## 2023-05-22 ENCOUNTER — RX RENEWAL (OUTPATIENT)
Age: 48
End: 2023-05-22

## 2023-06-01 ENCOUNTER — APPOINTMENT (OUTPATIENT)
Dept: INTERNAL MEDICINE | Facility: CLINIC | Age: 48
End: 2023-06-01
Payer: MEDICAID

## 2023-06-01 ENCOUNTER — APPOINTMENT (OUTPATIENT)
Dept: PULMONOLOGY | Facility: CLINIC | Age: 48
End: 2023-06-01
Payer: MEDICAID

## 2023-06-01 VITALS
OXYGEN SATURATION: 97 % | DIASTOLIC BLOOD PRESSURE: 68 MMHG | BODY MASS INDEX: 27 KG/M2 | SYSTOLIC BLOOD PRESSURE: 112 MMHG | HEIGHT: 67 IN | HEART RATE: 74 BPM | WEIGHT: 172 LBS | TEMPERATURE: 97.2 F

## 2023-06-01 DIAGNOSIS — R05.3 CHRONIC COUGH: ICD-10-CM

## 2023-06-01 DIAGNOSIS — F12.10 CANNABIS ABUSE, UNCOMPLICATED: ICD-10-CM

## 2023-06-01 PROCEDURE — 94726 PLETHYSMOGRAPHY LUNG VOLUMES: CPT

## 2023-06-01 PROCEDURE — 94729 DIFFUSING CAPACITY: CPT

## 2023-06-01 PROCEDURE — 99214 OFFICE O/P EST MOD 30 MIN: CPT | Mod: 25

## 2023-06-01 PROCEDURE — 94060 EVALUATION OF WHEEZING: CPT

## 2023-06-01 NOTE — REASON FOR VISIT
[Cough] : cough [Shortness of Breath] : shortness of breath [Nicotine Dependence] : nicotine dependence [Follow-Up] : a follow-up visit [Asthma] : asthma [TextBox_44] : Marijuana dependence

## 2023-06-01 NOTE — COUNSELING
[Inadequate social support] : Inadequate social support [Needs reinforcement, provided] : Patient needs reinforcement on understanding of lifestyle changes and steps needed to achieve self management goal; reinforcement was provided [No] : Risk of tobacco use and health benefits of smoking cessation discussed: No [Cessation strategies including cessation program discussed] : Cessation strategies including cessation program discussed [FreeTextEntry2] : Marijuana  [FreeTextEntry1] : 5

## 2023-06-01 NOTE — HISTORY OF PRESENT ILLNESS
[Former] : former [>= 20 pack years] : >= 20 pack years [Never] : never [Current] : current [TextBox_4] : Patient is a 47 Year old male former cigarette smoker, current everyday marijuana smoker presents for follow up cough, recurrent bronchitis.  Patient has been in his usual state of health.  He continues to cough.  He smokes marijuana daily.  He is here for follow up   [TextBox_11] : 1 [TextBox_13] : 25 [YearQuit] : 2017

## 2023-06-01 NOTE — ASSESSMENT
[FreeTextEntry1] : 47 year old male former cigarette smoker, current everyday marijuana smoker presents for follow up cough, PFT suggestive mild asthma \par \par Initiate trail of Symbicort 160/4.5 2 puffs BID\par Ipratropium nasal spray as directed \par

## 2023-06-01 NOTE — PROCEDURE
[FreeTextEntry1] : PFT 6/1/23 personally reviewed Mild obstructive ventilatory defect with mild gas exchange abnormality and corrects for alveolar volume

## 2023-06-06 ENCOUNTER — TRANSCRIPTION ENCOUNTER (OUTPATIENT)
Age: 48
End: 2023-06-06

## 2023-06-28 ENCOUNTER — TRANSCRIPTION ENCOUNTER (OUTPATIENT)
Age: 48
End: 2023-06-28

## 2023-07-10 ENCOUNTER — TRANSCRIPTION ENCOUNTER (OUTPATIENT)
Age: 48
End: 2023-07-10

## 2023-07-18 ENCOUNTER — TRANSCRIPTION ENCOUNTER (OUTPATIENT)
Age: 48
End: 2023-07-18

## 2023-07-28 ENCOUNTER — RX RENEWAL (OUTPATIENT)
Age: 48
End: 2023-07-28

## 2023-08-11 ENCOUNTER — NON-APPOINTMENT (OUTPATIENT)
Age: 48
End: 2023-08-11

## 2023-08-12 ENCOUNTER — TRANSCRIPTION ENCOUNTER (OUTPATIENT)
Age: 48
End: 2023-08-12

## 2023-08-14 ENCOUNTER — TRANSCRIPTION ENCOUNTER (OUTPATIENT)
Age: 48
End: 2023-08-14

## 2023-08-16 ENCOUNTER — TRANSCRIPTION ENCOUNTER (OUTPATIENT)
Age: 48
End: 2023-08-16

## 2023-08-17 ENCOUNTER — TRANSCRIPTION ENCOUNTER (OUTPATIENT)
Age: 48
End: 2023-08-17

## 2023-08-29 ENCOUNTER — TRANSCRIPTION ENCOUNTER (OUTPATIENT)
Age: 48
End: 2023-08-29

## 2023-09-06 ENCOUNTER — RX RENEWAL (OUTPATIENT)
Age: 48
End: 2023-09-06

## 2023-09-07 ENCOUNTER — APPOINTMENT (OUTPATIENT)
Dept: INTERNAL MEDICINE | Facility: CLINIC | Age: 48
End: 2023-09-07
Payer: MEDICAID

## 2023-09-07 ENCOUNTER — NON-APPOINTMENT (OUTPATIENT)
Age: 48
End: 2023-09-07

## 2023-09-07 VITALS
OXYGEN SATURATION: 97 % | DIASTOLIC BLOOD PRESSURE: 80 MMHG | TEMPERATURE: 97.3 F | HEART RATE: 68 BPM | SYSTOLIC BLOOD PRESSURE: 110 MMHG | BODY MASS INDEX: 26.53 KG/M2 | WEIGHT: 169 LBS | HEIGHT: 67 IN

## 2023-09-07 DIAGNOSIS — Z13.220 ENCOUNTER FOR SCREENING FOR LIPOID DISORDERS: ICD-10-CM

## 2023-09-07 DIAGNOSIS — Z12.5 ENCOUNTER FOR SCREENING FOR MALIGNANT NEOPLASM OF PROSTATE: ICD-10-CM

## 2023-09-07 DIAGNOSIS — Z00.00 ENCOUNTER FOR GENERAL ADULT MEDICAL EXAMINATION W/OUT ABNORMAL FINDINGS: ICD-10-CM

## 2023-09-07 PROCEDURE — 93000 ELECTROCARDIOGRAM COMPLETE: CPT

## 2023-09-07 PROCEDURE — 99396 PREV VISIT EST AGE 40-64: CPT | Mod: 25

## 2023-09-07 RX ORDER — OXYCODONE AND ACETAMINOPHEN 5; 325 MG/1; MG/1
5-325 TABLET ORAL
Qty: 10 | Refills: 0 | Status: DISCONTINUED | COMMUNITY
Start: 2022-08-24 | End: 2023-09-07

## 2023-09-07 RX ORDER — LIDOCAINE AND PRILOCAINE 25; 25 MG/G; MG/G
2.5-2.5 CREAM TOPICAL
Qty: 1 | Refills: 2 | Status: DISCONTINUED | COMMUNITY
Start: 2022-05-26 | End: 2023-09-07

## 2023-09-07 RX ORDER — DICYCLOMINE HYDROCHLORIDE 10 MG/1
10 CAPSULE ORAL 3 TIMES DAILY
Qty: 60 | Refills: 2 | Status: DISCONTINUED | COMMUNITY
Start: 2018-07-24 | End: 2023-09-07

## 2023-09-07 RX ORDER — AZELASTINE HYDROCHLORIDE 137 UG/1
0.1 SPRAY, METERED NASAL TWICE DAILY
Qty: 3 | Refills: 3 | Status: DISCONTINUED | COMMUNITY
Start: 2022-10-12 | End: 2023-09-07

## 2023-09-07 RX ORDER — SODIUM SULFATE, POTASSIUM SULFATE AND MAGNESIUM SULFATE 1.6; 3.13; 17.5 G/177ML; G/177ML; G/177ML
17.5-3.13-1.6 SOLUTION ORAL
Qty: 1 | Refills: 0 | Status: DISCONTINUED | COMMUNITY
Start: 2022-12-31 | End: 2023-09-07

## 2023-09-07 NOTE — ASSESSMENT
[FreeTextEntry1] : discussed w pt   reviewed updated hx , current rx  overall doing well   recent dx of asthma, following w pulm, on Symbicort. encouraged reduction and cessation of regular marijuana smoking which clearly can exacerbate  chronic anxiety , depression controlled on rx , stable, maintaining his job as a    cont current rx  check routine labs as below  diet, exercise , weight control advised   cont GI f/u w Dr Loredo,  IBS symptoms, diet control advised, colonoscopy updated 2023, repeat 10 years advised    he had COVID vaccines x 2 doses . declines any additional vaccines   RTO 6 months for routine f/u or earlier prn if any new concerns

## 2023-09-07 NOTE — PHYSICAL EXAM
[No Acute Distress] : no acute distress [Well Nourished] : well nourished [Well Developed] : well developed [Well-Appearing] : well-appearing [Normal Voice/Communication] : normal voice/communication [Normal Sclera/Conjunctiva] : normal sclera/conjunctiva [PERRL] : pupils equal round and reactive to light [Normal Outer Ear/Nose] : the outer ears and nose were normal in appearance [Normal Oropharynx] : the oropharynx was normal [Normal TMs] : both tympanic membranes were normal [No JVD] : no jugular venous distention [No Lymphadenopathy] : no lymphadenopathy [Supple] : supple [Thyroid Normal, No Nodules] : the thyroid was normal and there were no nodules present [No Respiratory Distress] : no respiratory distress  [No Accessory Muscle Use] : no accessory muscle use [Clear to Auscultation] : lungs were clear to auscultation bilaterally [Normal Rate] : normal rate  [Regular Rhythm] : with a regular rhythm [Normal S1, S2] : normal S1 and S2 [No Murmur] : no murmur heard [No Carotid Bruits] : no carotid bruits [No Abdominal Bruit] : a ~M bruit was not heard ~T in the abdomen [No Varicosities] : no varicosities [Pedal Pulses Present] : the pedal pulses are present [No Edema] : there was no peripheral edema [No Palpable Aorta] : no palpable aorta [No Extremity Clubbing/Cyanosis] : no extremity clubbing/cyanosis [Soft] : abdomen soft [Non Tender] : non-tender [Non-distended] : non-distended [No Masses] : no abdominal mass palpated [No HSM] : no HSM [Normal Bowel Sounds] : normal bowel sounds [No Spinal Tenderness] : no spinal tenderness [No Joint Swelling] : no joint swelling [Grossly Normal Strength/Tone] : grossly normal strength/tone [No Rash] : no rash [Coordination Grossly Intact] : coordination grossly intact [No Focal Deficits] : no focal deficits [Normal Gait] : normal gait [Speech Grossly Normal] : speech grossly normal [Normal Affect] : the affect was normal [Alert and Oriented x3] : oriented to person, place, and time [Normal Mood] : the mood was normal [Normal Insight/Judgement] : insight and judgment were intact [Normal Supraclavicular Nodes] : no supraclavicular lymphadenopathy [Normal Posterior Cervical Nodes] : no posterior cervical lymphadenopathy [Normal Anterior Cervical Nodes] : no anterior cervical lymphadenopathy [Memory Grossly Normal] : memory grossly normal

## 2023-09-07 NOTE — HEALTH RISK ASSESSMENT
[No] : In the past 12 months have you used drugs other than those required for medical reasons? No [None] : None [Single] : single [Former] : Former

## 2023-09-07 NOTE — HISTORY OF PRESENT ILLNESS
[de-identified] : 47 y/o man presents for annual exam. he feels well overall. no recent illnesses. reviewed updated hx.  recent dx of asthma, following w Dr Mg, now on Symbicort inhaler. continues to smoke marijuana regularly   chronic anxiety disorder unchanged on benzodiazepines, stable usage, monitoring use  IBS-like symptoms controlled w diet and occasional use of Librax prn, following w Dr Loredo , colonoscopy updated 2023  chronic anxiety/panic disorder on SSRI and alprazolam, taking as prescribed  s/p cranial surgery for excision of benign meningioma in 10/19 which was successful, no complications  s/p hemorrhoidectomy  chronic GERD on high dose PPI

## 2023-09-18 ENCOUNTER — TRANSCRIPTION ENCOUNTER (OUTPATIENT)
Age: 48
End: 2023-09-18

## 2023-09-25 ENCOUNTER — RX RENEWAL (OUTPATIENT)
Age: 48
End: 2023-09-25

## 2023-10-19 ENCOUNTER — TRANSCRIPTION ENCOUNTER (OUTPATIENT)
Age: 48
End: 2023-10-19

## 2023-10-30 ENCOUNTER — TRANSCRIPTION ENCOUNTER (OUTPATIENT)
Age: 48
End: 2023-10-30

## 2023-11-20 ENCOUNTER — OUTPATIENT (OUTPATIENT)
Dept: OUTPATIENT SERVICES | Facility: HOSPITAL | Age: 48
LOS: 1 days | End: 2023-11-20
Payer: MEDICAID

## 2023-11-20 ENCOUNTER — APPOINTMENT (OUTPATIENT)
Dept: MRI IMAGING | Facility: CLINIC | Age: 48
End: 2023-11-20
Payer: MEDICAID

## 2023-11-20 DIAGNOSIS — Z98.890 OTHER SPECIFIED POSTPROCEDURAL STATES: Chronic | ICD-10-CM

## 2023-11-20 DIAGNOSIS — D32.9 BENIGN NEOPLASM OF MENINGES, UNSPECIFIED: ICD-10-CM

## 2023-11-20 PROCEDURE — A9585: CPT

## 2023-11-20 PROCEDURE — 70553 MRI BRAIN STEM W/O & W/DYE: CPT

## 2023-11-20 PROCEDURE — 70553 MRI BRAIN STEM W/O & W/DYE: CPT | Mod: 26

## 2023-11-24 ENCOUNTER — RX RENEWAL (OUTPATIENT)
Age: 48
End: 2023-11-24

## 2023-11-25 NOTE — H&P PST ADULT - LYMPH NODES
THE PATIENT WAS SEEN AND EXAMINED BY ME WITH THE HOUSESTAFF AND STROKE TEAM DURING MORNING ROUNDS.     HPI:  62-year old woman with history of hypertension, rheumatoid arthritis, brought into ED at  initially for concerned for altered mental status.Prior to hospital presentation: neighbor overhead patient was looking for her mom, was confused/wandering around apartment complex. At [facility]: MR brain showed bilateral subacute to remote embolic strokes. MRI shows right posterior corona radiata acute infarct and multiple petechial hemorrhages in b/l thalami and basal ganglia. Noted to have KRUNAL. Also noted to have hypertension, on amlodipine, aldactone, and losartan. Started on B12 supplements. Seen by rheumatology. Transferred to [facility] for cerebral angiogram. On admission: NIHSS: 2  LKW: prior to 11/15 ([facility]) States has been smoking 1 ppd for several years.    SUBJECTIVE: Overnight pt with episode of lethargy, not following commands, not opening eyes. Repeat CT Head/CTA stable from previous. Exam subsequently improved without intervention.    amLODIPine   Tablet 10 milliGRAM(s) Oral daily  aspirin  chewable 81 milliGRAM(s) Oral daily  atorvastatin 80 milliGRAM(s) Oral at bedtime  enoxaparin Injectable 40 milliGRAM(s) SubCutaneous every 24 hours  insulin lispro (ADMELOG) corrective regimen sliding scale   SubCutaneous three times a day before meals  insulin lispro (ADMELOG) corrective regimen sliding scale   SubCutaneous at bedtime  losartan 25 milliGRAM(s) Oral daily  methylPREDNISolone sodium succinate IVPB 1000 milliGRAM(s) IV Intermittent daily  pantoprazole    Tablet 40 milliGRAM(s) Oral daily    PHYSICAL EXAM:   Vital Signs Last 24 Hrs  T(C): 36.7 (25 Nov 2023 05:02), Max: 37.2 (24 Nov 2023 08:19)  T(F): 98.1 (25 Nov 2023 05:02), Max: 98.9 (24 Nov 2023 08:19)  HR: 85 (25 Nov 2023 05:02) (77 - 107)  BP: 145/84 (25 Nov 2023 05:02) (137/81 - 176/81)  BP(mean): --  RR: 18 (25 Nov 2023 05:02) (18 - 18)  SpO2: 96% (25 Nov 2023 05:02) (93% - 98%)    Parameters below as of 25 Nov 2023 05:02  Patient On (Oxygen Delivery Method): nasal cannula  O2 Flow (L/min): 2    General: No acute distress  HEENT: EOM intact, visual fields full  Abdomen: Soft, nontender, nondistended   Extremities: No edema    NEUROLOGICAL EXAM:  Mental status: Awake, alert, oriented to name and 2023, speaks 1-2 words not month/year, follows some simple commands.  Cranial Nerves: trace L facial droop, no nystagmus, no dysarthria,  tongue midline  Motor exam: left UE drift, Lower extremities antigravity.   Sensation: Intact to light touch   Coordination/ Gait: No dysmetria, gait not tested    IMAGING: Reviewed by me.     11/21/23 CT HEAD: No hydrocephalus, acute intracranial hemorrhage, mass effect, or brain edema.    (11.18.2023)    Head CT: No acute intracranial hemorrhage, mass effect, or shift of the   midline structures.  Similar-appearing extensive chronic white matter microvascular type   changes and chronic lacunar infarcts, as discussed.    CTA neck and head: No large vessel occlusion or major stenosis.    MR Head w/ IV Cont (11.17.2023)  Right posterior corona radiata subacute infarction  Multiple remote infarctions within the cerebral hemispheric white   matter basal ganglia and thalami  Pervasive cerebral hemispheric white matter abnormality, possibly   accelerated form of ischemic white matter disease versus and/or   superimposed other inflammatory metabolic toxic or infectious process  Numerous remote petechial hemorrhages most prominently in the basal   ganglia and thalami  Diffuse brain volume loss upper range typical for age THE PATIENT WAS SEEN AND EXAMINED BY ME WITH THE HOUSESTAFF AND STROKE TEAM DURING MORNING ROUNDS.     HPI:  62-year old woman with history of hypertension, rheumatoid arthritis, brought into ED at  initially for concerned for altered mental status.Prior to hospital presentation: neighbor overhead patient was looking for her mom, was confused/wandering around apartment complex. At [facility]: MR brain showed bilateral subacute to remote embolic strokes. MRI shows right posterior corona radiata acute infarct and multiple petechial hemorrhages in b/l thalami and basal ganglia. Noted to have KRUNAL. Also noted to have hypertension, on amlodipine, aldactone, and losartan. Started on B12 supplements. Seen by rheumatology. Transferred to [facility] for cerebral angiogram. On admission: NIHSS: 2  LKW: prior to 11/15 ([facility]) States has been smoking 1 ppd for several years.    SUBJECTIVE: Overnight pt with episode of lethargy, not following commands, not opening eyes. Repeat CT Head/CTA stable from previous. Exam subsequently improved without intervention.    amLODIPine   Tablet 10 milliGRAM(s) Oral daily  aspirin  chewable 81 milliGRAM(s) Oral daily  atorvastatin 80 milliGRAM(s) Oral at bedtime  enoxaparin Injectable 40 milliGRAM(s) SubCutaneous every 24 hours  insulin lispro (ADMELOG) corrective regimen sliding scale   SubCutaneous three times a day before meals  insulin lispro (ADMELOG) corrective regimen sliding scale   SubCutaneous at bedtime  losartan 25 milliGRAM(s) Oral daily  methylPREDNISolone sodium succinate IVPB 1000 milliGRAM(s) IV Intermittent daily  pantoprazole    Tablet 40 milliGRAM(s) Oral daily    PHYSICAL EXAM:   Vital Signs Last 24 Hrs  T(C): 36.7 (25 Nov 2023 05:02), Max: 37.2 (24 Nov 2023 08:19)  T(F): 98.1 (25 Nov 2023 05:02), Max: 98.9 (24 Nov 2023 08:19)  HR: 85 (25 Nov 2023 05:02) (77 - 107)  BP: 145/84 (25 Nov 2023 05:02) (137/81 - 176/81)  BP(mean): --  RR: 18 (25 Nov 2023 05:02) (18 - 18)  SpO2: 96% (25 Nov 2023 05:02) (93% - 98%)    Parameters below as of 25 Nov 2023 05:02  Patient On (Oxygen Delivery Method): nasal cannula  O2 Flow (L/min): 2    General: No acute distress  HEENT: EOM intact, visual fields full  Abdomen: Soft, nontender, nondistended   Extremities: No edema    NEUROLOGICAL EXAM:  Mental status: Eyes closed, does not open to noxious stimuli. Resists eye opening by examiner. Answers yes/no questions with eyes closed.  Follows some simple commands.  Cranial Nerves: trace L facial droop, no nystagmus, no dysarthria,  tongue midline  Motor exam: Antigravity and symmetric throughout  Sensation: Intact to light touch   Coordination/ Gait: Deferred    IMAGING: Reviewed by me.     11/21/23 CT HEAD: No hydrocephalus, acute intracranial hemorrhage, mass effect, or brain edema.    (11.18.2023)    Head CT: No acute intracranial hemorrhage, mass effect, or shift of the   midline structures.  Similar-appearing extensive chronic white matter microvascular type   changes and chronic lacunar infarcts, as discussed.    CTA neck and head: No large vessel occlusion or major stenosis.    MR Head w/ IV Cont (11.17.2023)  Right posterior corona radiata subacute infarction  Multiple remote infarctions within the cerebral hemispheric white   matter basal ganglia and thalami  Pervasive cerebral hemispheric white matter abnormality, possibly   accelerated form of ischemic white matter disease versus and/or   superimposed other inflammatory metabolic toxic or infectious process  Numerous remote petechial hemorrhages most prominently in the basal   ganglia and thalami  Diffuse brain volume loss upper range typical for age detailed exam

## 2023-12-01 ENCOUNTER — APPOINTMENT (OUTPATIENT)
Dept: PULMONOLOGY | Facility: CLINIC | Age: 48
End: 2023-12-01
Payer: MEDICAID

## 2023-12-01 VITALS
DIASTOLIC BLOOD PRESSURE: 70 MMHG | RESPIRATION RATE: 15 BRPM | HEART RATE: 72 BPM | BODY MASS INDEX: 27.62 KG/M2 | OXYGEN SATURATION: 98 % | SYSTOLIC BLOOD PRESSURE: 110 MMHG | WEIGHT: 176 LBS | HEIGHT: 67 IN

## 2023-12-01 DIAGNOSIS — F33.1 MAJOR DEPRESSIVE DISORDER, RECURRENT, MODERATE: ICD-10-CM

## 2023-12-01 DIAGNOSIS — J45.30 MILD PERSISTENT ASTHMA, UNCOMPLICATED: ICD-10-CM

## 2023-12-01 DIAGNOSIS — F12.20 CANNABIS DEPENDENCE, UNCOMPLICATED: ICD-10-CM

## 2023-12-01 DIAGNOSIS — F12.90 CANNABIS USE, UNSPECIFIED, UNCOMPLICATED: ICD-10-CM

## 2023-12-01 DIAGNOSIS — J45.909 UNSPECIFIED ASTHMA, UNCOMPLICATED: ICD-10-CM

## 2023-12-01 PROCEDURE — 99214 OFFICE O/P EST MOD 30 MIN: CPT | Mod: 25

## 2023-12-01 PROCEDURE — 99406 BEHAV CHNG SMOKING 3-10 MIN: CPT

## 2023-12-11 ENCOUNTER — APPOINTMENT (OUTPATIENT)
Dept: SPINE | Facility: CLINIC | Age: 48
End: 2023-12-11

## 2023-12-13 ENCOUNTER — APPOINTMENT (OUTPATIENT)
Dept: SPINE | Facility: CLINIC | Age: 48
End: 2023-12-13
Payer: MEDICAID

## 2023-12-13 VITALS
SYSTOLIC BLOOD PRESSURE: 112 MMHG | DIASTOLIC BLOOD PRESSURE: 78 MMHG | OXYGEN SATURATION: 95 % | WEIGHT: 175 LBS | HEIGHT: 67 IN | BODY MASS INDEX: 27.47 KG/M2 | HEART RATE: 72 BPM

## 2023-12-13 DIAGNOSIS — D32.9 BENIGN NEOPLASM OF MENINGES, UNSPECIFIED: ICD-10-CM

## 2023-12-13 PROCEDURE — 99213 OFFICE O/P EST LOW 20 MIN: CPT

## 2023-12-13 RX ORDER — FLUTICASONE PROPIONATE 50 UG/1
50 SPRAY, METERED NASAL TWICE DAILY
Qty: 3 | Refills: 3 | Status: DISCONTINUED | COMMUNITY
Start: 2022-10-12 | End: 2023-12-13

## 2023-12-13 RX ORDER — TIMOLOL MALEATE 5 MG/ML
SOLUTION/ DROPS OPHTHALMIC
Refills: 0 | Status: ACTIVE | COMMUNITY

## 2023-12-13 RX ORDER — DIAZEPAM 2 MG/1
2 TABLET ORAL EVERY 8 HOURS
Qty: 12 | Refills: 0 | Status: DISCONTINUED | COMMUNITY
Start: 2022-08-04 | End: 2023-12-13

## 2023-12-13 RX ORDER — LIDOCAINE 4% 4 G/100G
4 CREAM TOPICAL
Qty: 1 | Refills: 2 | Status: DISCONTINUED | COMMUNITY
Start: 2022-05-24 | End: 2023-12-13

## 2023-12-13 RX ORDER — IPRATROPIUM BROMIDE 21 UG/1
0.03 SPRAY NASAL TWICE DAILY
Qty: 1 | Refills: 3 | Status: DISCONTINUED | COMMUNITY
Start: 2023-06-02 | End: 2023-12-13

## 2023-12-13 RX ORDER — PE/SHARK LIVER OIL/GLYC/WH.PET
0.25-3-12-18 CREAM (GRAM) RECTAL
Refills: 0 | Status: DISCONTINUED | COMMUNITY
End: 2023-12-13

## 2023-12-19 ENCOUNTER — TRANSCRIPTION ENCOUNTER (OUTPATIENT)
Age: 48
End: 2023-12-19

## 2024-01-02 ENCOUNTER — TRANSCRIPTION ENCOUNTER (OUTPATIENT)
Age: 49
End: 2024-01-02

## 2024-01-17 ENCOUNTER — NON-APPOINTMENT (OUTPATIENT)
Age: 49
End: 2024-01-17

## 2024-01-26 ENCOUNTER — RX RENEWAL (OUTPATIENT)
Age: 49
End: 2024-01-26

## 2024-01-26 ENCOUNTER — TRANSCRIPTION ENCOUNTER (OUTPATIENT)
Age: 49
End: 2024-01-26

## 2024-02-22 RX ORDER — PANTOPRAZOLE 40 MG/1
40 TABLET, DELAYED RELEASE ORAL
Qty: 90 | Refills: 0 | Status: ACTIVE | COMMUNITY
Start: 2021-09-17 | End: 1900-01-01

## 2024-02-27 ENCOUNTER — TRANSCRIPTION ENCOUNTER (OUTPATIENT)
Age: 49
End: 2024-02-27

## 2024-03-05 ENCOUNTER — TRANSCRIPTION ENCOUNTER (OUTPATIENT)
Age: 49
End: 2024-03-05

## 2024-03-13 RX ORDER — BUDESONIDE AND FORMOTEROL FUMARATE DIHYDRATE 160; 4.5 UG/1; UG/1
160-4.5 AEROSOL RESPIRATORY (INHALATION) TWICE DAILY
Qty: 3 | Refills: 3 | Status: ACTIVE | COMMUNITY
Start: 2023-06-02 | End: 1900-01-01

## 2024-03-14 ENCOUNTER — TRANSCRIPTION ENCOUNTER (OUTPATIENT)
Age: 49
End: 2024-03-14

## 2024-03-28 ENCOUNTER — TRANSCRIPTION ENCOUNTER (OUTPATIENT)
Age: 49
End: 2024-03-28

## 2024-04-08 ENCOUNTER — RX RENEWAL (OUTPATIENT)
Age: 49
End: 2024-04-08

## 2024-04-08 RX ORDER — SERTRALINE HYDROCHLORIDE 50 MG/1
50 TABLET, FILM COATED ORAL
Qty: 30 | Refills: 3 | Status: ACTIVE | COMMUNITY
Start: 2021-05-07 | End: 1900-01-01

## 2024-04-08 NOTE — PATIENT PROFILE ADULT - OVER THE PAST TWO WEEKS HAVE YOU FELT DOWN, DEPRESSED OR HOPELESS?
[no

## 2024-04-11 ENCOUNTER — RX RENEWAL (OUTPATIENT)
Age: 49
End: 2024-04-11

## 2024-04-11 RX ORDER — SERTRALINE HYDROCHLORIDE 100 MG/1
100 TABLET, FILM COATED ORAL
Qty: 30 | Refills: 3 | Status: ACTIVE | COMMUNITY
Start: 2019-02-07 | End: 1900-01-01

## 2024-04-25 ENCOUNTER — APPOINTMENT (OUTPATIENT)
Dept: GASTROENTEROLOGY | Facility: CLINIC | Age: 49
End: 2024-04-25
Payer: MEDICAID

## 2024-04-25 VITALS
WEIGHT: 175 LBS | OXYGEN SATURATION: 97 % | SYSTOLIC BLOOD PRESSURE: 120 MMHG | TEMPERATURE: 97.1 F | HEART RATE: 72 BPM | DIASTOLIC BLOOD PRESSURE: 76 MMHG | BODY MASS INDEX: 27.47 KG/M2 | HEIGHT: 67 IN

## 2024-04-25 DIAGNOSIS — R11.0 NAUSEA: ICD-10-CM

## 2024-04-25 DIAGNOSIS — Z80.42 FAMILY HISTORY OF MALIGNANT NEOPLASM OF PROSTATE: ICD-10-CM

## 2024-04-25 DIAGNOSIS — R13.10 DYSPHAGIA, UNSPECIFIED: ICD-10-CM

## 2024-04-25 DIAGNOSIS — R10.9 UNSPECIFIED ABDOMINAL PAIN: ICD-10-CM

## 2024-04-25 DIAGNOSIS — R19.5 OTHER FECAL ABNORMALITIES: ICD-10-CM

## 2024-04-25 DIAGNOSIS — R14.0 ABDOMINAL DISTENSION (GASEOUS): ICD-10-CM

## 2024-04-25 DIAGNOSIS — K21.9 GASTRO-ESOPHAGEAL REFLUX DISEASE W/OUT ESOPHAGITIS: ICD-10-CM

## 2024-04-25 PROCEDURE — 99215 OFFICE O/P EST HI 40 MIN: CPT

## 2024-04-25 RX ORDER — BUDESONIDE AND FORMOTEROL FUMARATE DIHYDRATE 160; 4.5 UG/1; UG/1
160-4.5 AEROSOL RESPIRATORY (INHALATION) TWICE DAILY
Qty: 1 | Refills: 0 | Status: DISCONTINUED | COMMUNITY
Start: 2023-12-01 | End: 2024-04-25

## 2024-04-25 NOTE — CONSULT LETTER
[FreeTextEntry1] : Dear Dr. Bebeto Castro,   I had the pleasure of seeing your patient MARISELA PARKER in the office today.  My office note is attached. PLEASE READ THE "ASSESSMENT" SECTION OF THE NOTE TO SEE MY IMPRESSION AND PLAN.   Thank you very much for allowing me to participate in the care of your patient.   Sincerely,   Ray Lubin M.D., FAC, FACP Director, Celiac Program at [facility]  of Medicine, [employer] at [facility] Adjunct  of Medicine, [facility] Practice Director, [facility] - Gastroenterology at [address] - [address] [place] [address] Tel: [phone] Email: [email or web address]     The attached note has been created using a voice recognition system (Dragon).  There may be some misspellings and typos.  Please call my office if you have any issues or questions.

## 2024-04-25 NOTE — HISTORY OF PRESENT ILLNESS
[FreeTextEntry1] : The patient was a patient of Dr. Loredo, who has passed away.  The patient is now seeing me to transfer care. We spent some time reviewing the patient's medical history.  The patient is a 48-year-old man who was last seen in the office by Dr. Loredo on May 24, 2022.  She last had an EGD on November 1, 2021 which was negative although he has a history of peptic ulcers.  He last had a colonoscopy on February 23, 2023 which was negative other than internal hemorrhoids.  Previous colonoscopy included random biopsies throughout the colon which were negative.  The patient is on pantoprazole 40 mg a day for chronic reflux and also carries a diagnosis of IBS.  He states that he gets occasional heartburn.  He chokes when he eats and drinks with dysphagia.  He denies regurgitation.  He also notes constant nausea.  He denies vomiting.  He smokes a significant amount of marijuana which she states resolves the nausea.  He also notes left-sided abdominal pain and bloating.  He reports 2-3 bowel movements a day which are usually loose stools although sometimes solid.  He denies melena or bright red blood per rectum.  The patient's weight is stable.  The patient has not been admitted to the hospital in the past year and denies any cardiac issues.

## 2024-04-25 NOTE — ASSESSMENT
[FreeTextEntry1] : Patient with reflux who is on pantoprazole 40 mg a day.  He also carries a diagnosis of IBS.  He has dysphagia to both solids and liquids and constant nausea as well as bloating and abdominal pain.  He notes loose stools.  Patient was sent for an esophagram.  An EGD has been scheduled. The risks, benefits, alternatives, and limitations of the procedure were explained.  Stool studies will be sent for a GI infectious PCR panel, C. diff by PCR, calprotectin, and fecal elastase.  Patient will continue pantoprazole 40 mg a day.

## 2024-04-29 ENCOUNTER — TRANSCRIPTION ENCOUNTER (OUTPATIENT)
Age: 49
End: 2024-04-29

## 2024-05-03 LAB
ALBUMIN SERPL ELPH-MCNC: 4.7 G/DL
ALP BLD-CCNC: 105 U/L
ALT SERPL-CCNC: 16 U/L
ANION GAP SERPL CALC-SCNC: 13 MMOL/L
APPEARANCE: CLEAR
AST SERPL-CCNC: 18 U/L
BACTERIA: NEGATIVE /HPF
BASOPHILS # BLD AUTO: 0.04 K/UL
BASOPHILS NFR BLD AUTO: 0.5 %
BILIRUB SERPL-MCNC: 0.2 MG/DL
BILIRUBIN URINE: NEGATIVE
BLOOD URINE: NEGATIVE
BUN SERPL-MCNC: 17 MG/DL
CALCIUM SERPL-MCNC: 9.9 MG/DL
CAST: 0 /LPF
CHLORIDE SERPL-SCNC: 104 MMOL/L
CHOLEST SERPL-MCNC: 314 MG/DL
CO2 SERPL-SCNC: 26 MMOL/L
COLOR: YELLOW
CREAT SERPL-MCNC: 1.05 MG/DL
EGFR: 89 ML/MIN/1.73M2
EOSINOPHIL # BLD AUTO: 0.11 K/UL
EOSINOPHIL NFR BLD AUTO: 1.5 %
EPITHELIAL CELLS: 0 /HPF
ESTIMATED AVERAGE GLUCOSE: 126 MG/DL
GLUCOSE QUALITATIVE U: NEGATIVE MG/DL
GLUCOSE SERPL-MCNC: 100 MG/DL
HBA1C MFR BLD HPLC: 6 %
HCT VFR BLD CALC: 49.8 %
HDLC SERPL-MCNC: 41 MG/DL
HGB BLD-MCNC: 16 G/DL
IMM GRANULOCYTES NFR BLD AUTO: 0.4 %
KETONES URINE: NEGATIVE MG/DL
LDLC SERPL CALC-MCNC: 237 MG/DL
LEUKOCYTE ESTERASE URINE: NEGATIVE
LYMPHOCYTES # BLD AUTO: 1.89 K/UL
LYMPHOCYTES NFR BLD AUTO: 25.1 %
MAN DIFF?: NORMAL
MCHC RBC-ENTMCNC: 28.2 PG
MCHC RBC-ENTMCNC: 32.1 GM/DL
MCV RBC AUTO: 87.7 FL
MICROSCOPIC-UA: NORMAL
MONOCYTES # BLD AUTO: 0.43 K/UL
MONOCYTES NFR BLD AUTO: 5.7 %
NEUTROPHILS # BLD AUTO: 5.03 K/UL
NEUTROPHILS NFR BLD AUTO: 66.8 %
NITRITE URINE: NEGATIVE
NONHDLC SERPL-MCNC: 272 MG/DL
PH URINE: 5.5
PLATELET # BLD AUTO: 192 K/UL
POTASSIUM SERPL-SCNC: 5.1 MMOL/L
PROT SERPL-MCNC: 7.4 G/DL
PROTEIN URINE: NEGATIVE MG/DL
PSA SERPL-MCNC: 2.39 NG/ML
RBC # BLD: 5.68 M/UL
RBC # FLD: 14.9 %
RED BLOOD CELLS URINE: 0 /HPF
SODIUM SERPL-SCNC: 143 MMOL/L
SPECIFIC GRAVITY URINE: 1.02
T4 FREE SERPL-MCNC: 1.1 NG/DL
TRIGL SERPL-MCNC: 178 MG/DL
TSH SERPL-ACNC: 0.88 UIU/ML
UROBILINOGEN URINE: 0.2 MG/DL
WBC # FLD AUTO: 7.53 K/UL
WHITE BLOOD CELLS URINE: 0 /HPF

## 2024-05-16 ENCOUNTER — NON-APPOINTMENT (OUTPATIENT)
Age: 49
End: 2024-05-16

## 2024-05-23 ENCOUNTER — APPOINTMENT (OUTPATIENT)
Dept: INTERNAL MEDICINE | Facility: CLINIC | Age: 49
End: 2024-05-23

## 2024-06-04 ENCOUNTER — APPOINTMENT (OUTPATIENT)
Dept: PULMONOLOGY | Facility: CLINIC | Age: 49
End: 2024-06-04

## 2024-06-05 ENCOUNTER — TRANSCRIPTION ENCOUNTER (OUTPATIENT)
Age: 49
End: 2024-06-05

## 2024-06-05 RX ORDER — ALPRAZOLAM 0.5 MG/1
0.5 TABLET ORAL
Qty: 90 | Refills: 0 | Status: ACTIVE | COMMUNITY
Start: 2019-03-14 | End: 1900-01-01

## 2024-06-10 ENCOUNTER — APPOINTMENT (OUTPATIENT)
Dept: INTERNAL MEDICINE | Facility: CLINIC | Age: 49
End: 2024-06-10
Payer: MEDICAID

## 2024-06-10 VITALS
WEIGHT: 176 LBS | TEMPERATURE: 98.7 F | BODY MASS INDEX: 27.62 KG/M2 | OXYGEN SATURATION: 97 % | HEART RATE: 63 BPM | HEIGHT: 67 IN | SYSTOLIC BLOOD PRESSURE: 118 MMHG | DIASTOLIC BLOOD PRESSURE: 80 MMHG

## 2024-06-10 DIAGNOSIS — F41.9 ANXIETY DISORDER, UNSPECIFIED: ICD-10-CM

## 2024-06-10 DIAGNOSIS — G47.00 INSOMNIA, UNSPECIFIED: ICD-10-CM

## 2024-06-10 DIAGNOSIS — J45.909 UNSPECIFIED ASTHMA, UNCOMPLICATED: ICD-10-CM

## 2024-06-10 DIAGNOSIS — F32.A DEPRESSION, UNSPECIFIED: ICD-10-CM

## 2024-06-10 DIAGNOSIS — K58.9 IRRITABLE BOWEL SYNDROME W/OUT DIARRHEA: ICD-10-CM

## 2024-06-10 DIAGNOSIS — K21.9 GASTRO-ESOPHAGEAL REFLUX DISEASE W/OUT ESOPHAGITIS: ICD-10-CM

## 2024-06-10 PROCEDURE — 99213 OFFICE O/P EST LOW 20 MIN: CPT

## 2024-06-11 ENCOUNTER — OUTPATIENT (OUTPATIENT)
Dept: OUTPATIENT SERVICES | Facility: HOSPITAL | Age: 49
LOS: 1 days | End: 2024-06-11

## 2024-06-11 ENCOUNTER — APPOINTMENT (OUTPATIENT)
Dept: RADIOLOGY | Facility: HOSPITAL | Age: 49
End: 2024-06-11
Payer: MEDICAID

## 2024-06-11 DIAGNOSIS — Z98.890 OTHER SPECIFIED POSTPROCEDURAL STATES: Chronic | ICD-10-CM

## 2024-06-11 DIAGNOSIS — K21.9 GASTRO-ESOPHAGEAL REFLUX DISEASE WITHOUT ESOPHAGITIS: ICD-10-CM

## 2024-06-11 PROCEDURE — 74220 X-RAY XM ESOPHAGUS 1CNTRST: CPT | Mod: 26

## 2024-06-16 PROBLEM — K58.9 IBS (IRRITABLE BOWEL SYNDROME): Status: ACTIVE | Noted: 2017-08-09

## 2024-06-16 PROBLEM — F32.A CHRONIC DEPRESSION: Status: ACTIVE | Noted: 2021-05-05

## 2024-06-16 PROBLEM — K21.9 GASTROESOPHAGEAL REFLUX DISEASE: Status: ACTIVE | Noted: 2024-04-25

## 2024-06-16 PROBLEM — J45.909 ASTHMA: Status: ACTIVE | Noted: 2023-06-02

## 2024-06-16 NOTE — HISTORY OF PRESENT ILLNESS
[de-identified] : 49M PMH asthma, GERD, IBS, insomnia, anxiety and depression presenting for med refills for Xanax.  He reports he has been treated for anxiety and depression 7 years ago and sertraline 150mg daily for depression and Xanax 0.5mg TID PRN for anxiety. He sees a therapist once a week. He reports stresses come from family and social circles. He is single and reports good support with friends around him. He denies any SI/HI.  Insomnia - on zolpidem  asthma - well-controlled on Symbicort   IBS/GERD - on PPI, follows GI. Esophagram tomorrow. Previous EGD and colo including colonic biopsies are negative

## 2024-06-16 NOTE — PLAN
[FreeTextEntry1] : 49M PMH asthma, GERD, insomnia, anxiety and depression presenting for med refills for Xanax.  # anxiety and depression - treated for anxiety and depression 7 years ago  - on sertraline 150mg daily for depression and Xanax 0.5mg TID PRN for anxiety - sees a therapist once a week. He reports stresses come from family and social circles - reports good support with friends around him - denies any SI/HI - xanax renewed  # Insomnia  - on zolpidem - follows psychiatry  # asthma - well-controlled on Symbicort   # IBS/GERD  -  on PPI, follows GI  - EGD 11/2021 which was negative  - colonoscopy 2/2023 which was negative other than internal hemorrhoids. Previous colonoscopy included random biopsies throughout the colon which were negative - esophagram tomorrow

## 2024-06-17 ENCOUNTER — TRANSCRIPTION ENCOUNTER (OUTPATIENT)
Age: 49
End: 2024-06-17

## 2024-06-19 RX ORDER — ZOLPIDEM TARTRATE 10 MG/1
10 TABLET ORAL
Qty: 30 | Refills: 0 | Status: ACTIVE | COMMUNITY
Start: 2018-08-14 | End: 1900-01-01

## 2024-07-08 ENCOUNTER — TRANSCRIPTION ENCOUNTER (OUTPATIENT)
Age: 49
End: 2024-07-08

## 2024-07-10 ENCOUNTER — APPOINTMENT (OUTPATIENT)
Dept: GASTROENTEROLOGY | Facility: AMBULATORY MEDICAL SERVICES | Age: 49
End: 2024-07-10
Payer: MEDICAID

## 2024-07-10 PROCEDURE — 43239 EGD BIOPSY SINGLE/MULTIPLE: CPT

## 2024-08-08 ENCOUNTER — TRANSCRIPTION ENCOUNTER (OUTPATIENT)
Age: 49
End: 2024-08-08

## 2024-08-19 ENCOUNTER — TRANSCRIPTION ENCOUNTER (OUTPATIENT)
Age: 49
End: 2024-08-19

## 2024-09-02 ENCOUNTER — RX RENEWAL (OUTPATIENT)
Age: 49
End: 2024-09-02

## 2024-09-09 ENCOUNTER — APPOINTMENT (OUTPATIENT)
Dept: INTERNAL MEDICINE | Facility: CLINIC | Age: 49
End: 2024-09-09

## 2024-09-09 ENCOUNTER — TRANSCRIPTION ENCOUNTER (OUTPATIENT)
Age: 49
End: 2024-09-09

## 2024-10-14 ENCOUNTER — TRANSCRIPTION ENCOUNTER (OUTPATIENT)
Age: 49
End: 2024-10-14

## 2024-10-23 ENCOUNTER — TRANSCRIPTION ENCOUNTER (OUTPATIENT)
Age: 49
End: 2024-10-23

## 2024-10-23 ENCOUNTER — NON-APPOINTMENT (OUTPATIENT)
Age: 49
End: 2024-10-23

## 2024-11-06 ENCOUNTER — NON-APPOINTMENT (OUTPATIENT)
Age: 49
End: 2024-11-06

## 2024-11-11 PROBLEM — R22.30 MASS OF ARM: Status: ACTIVE | Noted: 2024-11-11

## 2024-11-12 ENCOUNTER — APPOINTMENT (OUTPATIENT)
Dept: PLASTIC SURGERY | Facility: CLINIC | Age: 49
End: 2024-11-12
Payer: MEDICAID

## 2024-11-12 VITALS
DIASTOLIC BLOOD PRESSURE: 78 MMHG | TEMPERATURE: 97.9 F | OXYGEN SATURATION: 98 % | HEIGHT: 67 IN | WEIGHT: 170 LBS | BODY MASS INDEX: 26.68 KG/M2 | SYSTOLIC BLOOD PRESSURE: 116 MMHG | HEART RATE: 68 BPM

## 2024-11-12 DIAGNOSIS — R22.30 LOCALIZED SWELLING, MASS AND LUMP, UNSPECIFIED UPPER LIMB: ICD-10-CM

## 2024-11-12 PROCEDURE — 99203 OFFICE O/P NEW LOW 30 MIN: CPT

## 2024-11-13 ENCOUNTER — APPOINTMENT (OUTPATIENT)
Dept: INTERNAL MEDICINE | Facility: CLINIC | Age: 49
End: 2024-11-13
Payer: MEDICAID

## 2024-11-13 VITALS
TEMPERATURE: 98.3 F | DIASTOLIC BLOOD PRESSURE: 72 MMHG | SYSTOLIC BLOOD PRESSURE: 98 MMHG | OXYGEN SATURATION: 98 % | HEART RATE: 69 BPM

## 2024-11-13 VITALS — HEIGHT: 67 IN | BODY MASS INDEX: 26.06 KG/M2 | WEIGHT: 166 LBS

## 2024-11-13 DIAGNOSIS — R73.03 PREDIABETES.: ICD-10-CM

## 2024-11-13 DIAGNOSIS — G47.00 INSOMNIA, UNSPECIFIED: ICD-10-CM

## 2024-11-13 DIAGNOSIS — F41.1 GENERALIZED ANXIETY DISORDER: ICD-10-CM

## 2024-11-13 DIAGNOSIS — K58.9 IRRITABLE BOWEL SYNDROME, UNSPECIFIED: ICD-10-CM

## 2024-11-13 DIAGNOSIS — E78.5 HYPERLIPIDEMIA, UNSPECIFIED: ICD-10-CM

## 2024-11-13 DIAGNOSIS — Z12.5 ENCOUNTER FOR SCREENING FOR MALIGNANT NEOPLASM OF PROSTATE: ICD-10-CM

## 2024-11-13 DIAGNOSIS — J45.30 MILD PERSISTENT ASTHMA, UNCOMPLICATED: ICD-10-CM

## 2024-11-13 DIAGNOSIS — Z00.00 ENCOUNTER FOR GENERAL ADULT MEDICAL EXAMINATION W/OUT ABNORMAL FINDINGS: ICD-10-CM

## 2024-11-13 DIAGNOSIS — F12.90 CANNABIS USE, UNSPECIFIED, UNCOMPLICATED: ICD-10-CM

## 2024-11-13 DIAGNOSIS — Z13.220 ENCOUNTER FOR SCREENING FOR LIPOID DISORDERS: ICD-10-CM

## 2024-11-13 PROCEDURE — 99396 PREV VISIT EST AGE 40-64: CPT

## 2024-11-15 ENCOUNTER — TRANSCRIPTION ENCOUNTER (OUTPATIENT)
Age: 49
End: 2024-11-15

## 2024-11-15 ENCOUNTER — NON-APPOINTMENT (OUTPATIENT)
Age: 49
End: 2024-11-15

## 2024-11-18 ENCOUNTER — NON-APPOINTMENT (OUTPATIENT)
Age: 49
End: 2024-11-18

## 2024-11-18 LAB
ALBUMIN SERPL ELPH-MCNC: 4.9 G/DL
ALP BLD-CCNC: 106 U/L
ALT SERPL-CCNC: 20 U/L
ANION GAP SERPL CALC-SCNC: 15 MMOL/L
AST SERPL-CCNC: 18 U/L
BASOPHILS # BLD AUTO: 0.02 K/UL
BASOPHILS NFR BLD AUTO: 0.3 %
BILIRUB SERPL-MCNC: 0.2 MG/DL
BUN SERPL-MCNC: 15 MG/DL
CALCIUM SERPL-MCNC: 9.9 MG/DL
CHLORIDE SERPL-SCNC: 102 MMOL/L
CHOLEST SERPL-MCNC: 322 MG/DL
CO2 SERPL-SCNC: 23 MMOL/L
CREAT SERPL-MCNC: 0.99 MG/DL
EGFR: 95 ML/MIN/1.73M2
EOSINOPHIL # BLD AUTO: 0.11 K/UL
EOSINOPHIL NFR BLD AUTO: 1.6 %
ESTIMATED AVERAGE GLUCOSE: 117 MG/DL
GLUCOSE SERPL-MCNC: 96 MG/DL
HBA1C MFR BLD HPLC: 5.7 %
HCT VFR BLD CALC: 47.9 %
HDLC SERPL-MCNC: 36 MG/DL
HGB BLD-MCNC: 15.9 G/DL
IMM GRANULOCYTES NFR BLD AUTO: 0.4 %
LDLC SERPL CALC-MCNC: 216 MG/DL
LYMPHOCYTES # BLD AUTO: 1.79 K/UL
LYMPHOCYTES NFR BLD AUTO: 25.8 %
MAN DIFF?: NORMAL
MCHC RBC-ENTMCNC: 28.7 PG
MCHC RBC-ENTMCNC: 33.2 G/DL
MCV RBC AUTO: 86.5 FL
MONOCYTES # BLD AUTO: 0.45 K/UL
MONOCYTES NFR BLD AUTO: 6.5 %
NEUTROPHILS # BLD AUTO: 4.53 K/UL
NEUTROPHILS NFR BLD AUTO: 65.4 %
NONHDLC SERPL-MCNC: 286 MG/DL
PLATELET # BLD AUTO: 188 K/UL
POTASSIUM SERPL-SCNC: 4.3 MMOL/L
PROT SERPL-MCNC: 7.7 G/DL
PSA SERPL-MCNC: 3.13 NG/ML
RBC # BLD: 5.54 M/UL
RBC # FLD: 14.3 %
SODIUM SERPL-SCNC: 140 MMOL/L
TRIGL SERPL-MCNC: 332 MG/DL
TSH SERPL-ACNC: 0.71 UIU/ML
WBC # FLD AUTO: 6.93 K/UL

## 2024-11-27 ENCOUNTER — TRANSCRIPTION ENCOUNTER (OUTPATIENT)
Age: 49
End: 2024-11-27

## 2024-12-13 ENCOUNTER — RX RENEWAL (OUTPATIENT)
Age: 49
End: 2024-12-13

## 2024-12-18 ENCOUNTER — TRANSCRIPTION ENCOUNTER (OUTPATIENT)
Age: 49
End: 2024-12-18

## 2024-12-23 ENCOUNTER — TRANSCRIPTION ENCOUNTER (OUTPATIENT)
Age: 49
End: 2024-12-23

## 2024-12-30 ENCOUNTER — TRANSCRIPTION ENCOUNTER (OUTPATIENT)
Age: 49
End: 2024-12-30

## 2024-12-31 ENCOUNTER — APPOINTMENT (OUTPATIENT)
Dept: PLASTIC SURGERY | Facility: CLINIC | Age: 49
End: 2024-12-31

## 2025-01-03 ENCOUNTER — APPOINTMENT (OUTPATIENT)
Dept: PLASTIC SURGERY | Facility: CLINIC | Age: 50
End: 2025-01-03

## 2025-01-03 VITALS — OXYGEN SATURATION: 96 % | DIASTOLIC BLOOD PRESSURE: 83 MMHG | HEART RATE: 78 BPM | SYSTOLIC BLOOD PRESSURE: 122 MMHG

## 2025-01-03 DIAGNOSIS — R22.30 LOCALIZED SWELLING, MASS AND LUMP, UNSPECIFIED UPPER LIMB: ICD-10-CM

## 2025-01-03 PROCEDURE — 14020 TIS TRNFR S/A/L 10 SQ CM/<: CPT

## 2025-01-08 ENCOUNTER — NON-APPOINTMENT (OUTPATIENT)
Age: 50
End: 2025-01-08

## 2025-01-09 ENCOUNTER — NON-APPOINTMENT (OUTPATIENT)
Age: 50
End: 2025-01-09

## 2025-01-09 LAB — CORE LAB BIOPSY: NORMAL

## 2025-01-21 ENCOUNTER — TRANSCRIPTION ENCOUNTER (OUTPATIENT)
Age: 50
End: 2025-01-21

## 2025-01-30 ENCOUNTER — RX RENEWAL (OUTPATIENT)
Age: 50
End: 2025-01-30

## 2025-01-30 ENCOUNTER — TRANSCRIPTION ENCOUNTER (OUTPATIENT)
Age: 50
End: 2025-01-30

## 2025-02-10 ENCOUNTER — RX RENEWAL (OUTPATIENT)
Age: 50
End: 2025-02-10

## 2025-03-04 ENCOUNTER — TRANSCRIPTION ENCOUNTER (OUTPATIENT)
Age: 50
End: 2025-03-04

## 2025-03-06 ENCOUNTER — TRANSCRIPTION ENCOUNTER (OUTPATIENT)
Age: 50
End: 2025-03-06

## 2025-03-20 ENCOUNTER — TRANSCRIPTION ENCOUNTER (OUTPATIENT)
Age: 50
End: 2025-03-20

## 2025-04-05 ENCOUNTER — TRANSCRIPTION ENCOUNTER (OUTPATIENT)
Age: 50
End: 2025-04-05

## 2025-04-23 NOTE — HEALTH RISK ASSESSMENT
[None] : None [Single] : single [No] : In the past 12 months have you used drugs other than those required for medical reasons? No [] : No show

## 2025-04-24 ENCOUNTER — RX RENEWAL (OUTPATIENT)
Age: 50
End: 2025-04-24

## 2025-05-06 ENCOUNTER — TRANSCRIPTION ENCOUNTER (OUTPATIENT)
Age: 50
End: 2025-05-06

## 2025-05-09 ENCOUNTER — RX RENEWAL (OUTPATIENT)
Age: 50
End: 2025-05-09

## 2025-06-09 ENCOUNTER — TRANSCRIPTION ENCOUNTER (OUTPATIENT)
Age: 50
End: 2025-06-09

## 2025-06-18 ENCOUNTER — TRANSCRIPTION ENCOUNTER (OUTPATIENT)
Age: 50
End: 2025-06-18

## 2025-07-10 ENCOUNTER — TRANSCRIPTION ENCOUNTER (OUTPATIENT)
Age: 50
End: 2025-07-10

## 2025-07-28 ENCOUNTER — RX RENEWAL (OUTPATIENT)
Age: 50
End: 2025-07-28

## 2025-08-04 ENCOUNTER — APPOINTMENT (OUTPATIENT)
Dept: INTERNAL MEDICINE | Facility: CLINIC | Age: 50
End: 2025-08-04
Payer: COMMERCIAL

## 2025-08-04 VITALS
HEART RATE: 66 BPM | WEIGHT: 168 LBS | HEIGHT: 67 IN | DIASTOLIC BLOOD PRESSURE: 70 MMHG | BODY MASS INDEX: 26.37 KG/M2 | TEMPERATURE: 98.3 F | OXYGEN SATURATION: 98 % | SYSTOLIC BLOOD PRESSURE: 128 MMHG

## 2025-08-04 DIAGNOSIS — E78.5 HYPERLIPIDEMIA, UNSPECIFIED: ICD-10-CM

## 2025-08-04 DIAGNOSIS — F41.9 ANXIETY DISORDER, UNSPECIFIED: ICD-10-CM

## 2025-08-04 DIAGNOSIS — R73.03 PREDIABETES.: ICD-10-CM

## 2025-08-04 DIAGNOSIS — Z01.84 ENCOUNTER FOR ANTIBODY RESPONSE EXAMINATION: ICD-10-CM

## 2025-08-04 PROCEDURE — 99214 OFFICE O/P EST MOD 30 MIN: CPT

## 2025-08-06 ENCOUNTER — NON-APPOINTMENT (OUTPATIENT)
Age: 50
End: 2025-08-06

## 2025-08-06 LAB
CHOLEST SERPL-MCNC: 313 MG/DL
ESTIMATED AVERAGE GLUCOSE: 120 MG/DL
HBA1C MFR BLD HPLC: 5.8 %
HBV SURFACE AB SER QL: NONREACTIVE
HDLC SERPL-MCNC: 35 MG/DL
LDLC SERPL-MCNC: 204 MG/DL
NONHDLC SERPL-MCNC: 278 MG/DL
TRIGL SERPL-MCNC: 358 MG/DL

## 2025-08-06 RX ORDER — ATORVASTATIN CALCIUM 40 MG/1
40 TABLET, FILM COATED ORAL
Qty: 90 | Refills: 0 | Status: ACTIVE | COMMUNITY
Start: 2025-08-06 | End: 1900-01-01

## 2025-08-07 ENCOUNTER — RX RENEWAL (OUTPATIENT)
Age: 50
End: 2025-08-07

## 2025-08-13 ENCOUNTER — TRANSCRIPTION ENCOUNTER (OUTPATIENT)
Age: 50
End: 2025-08-13

## 2025-09-18 ENCOUNTER — TRANSCRIPTION ENCOUNTER (OUTPATIENT)
Age: 50
End: 2025-09-18